# Patient Record
Sex: MALE | Race: WHITE | NOT HISPANIC OR LATINO | Employment: OTHER | URBAN - METROPOLITAN AREA
[De-identification: names, ages, dates, MRNs, and addresses within clinical notes are randomized per-mention and may not be internally consistent; named-entity substitution may affect disease eponyms.]

---

## 2017-03-21 ENCOUNTER — OFFICE VISIT (OUTPATIENT)
Dept: FAMILY MEDICINE CLINIC | Facility: CLINIC | Age: 67
End: 2017-03-21

## 2017-03-21 VITALS
HEART RATE: 111 BPM | RESPIRATION RATE: 16 BRPM | DIASTOLIC BLOOD PRESSURE: 76 MMHG | HEIGHT: 68 IN | SYSTOLIC BLOOD PRESSURE: 140 MMHG | WEIGHT: 217.2 LBS | TEMPERATURE: 98.2 F | BODY MASS INDEX: 32.92 KG/M2 | OXYGEN SATURATION: 98 %

## 2017-03-21 DIAGNOSIS — J01.01 ACUTE RECURRENT MAXILLARY SINUSITIS: Primary | ICD-10-CM

## 2017-03-21 PROCEDURE — 96372 THER/PROPH/DIAG INJ SC/IM: CPT | Performed by: INTERNAL MEDICINE

## 2017-03-21 PROCEDURE — 99213 OFFICE O/P EST LOW 20 MIN: CPT | Performed by: INTERNAL MEDICINE

## 2017-03-21 RX ORDER — ACETAMINOPHEN 500 MG
500 TABLET ORAL EVERY 6 HOURS PRN
COMMUNITY
End: 2022-08-24

## 2017-03-21 RX ORDER — AZITHROMYCIN 250 MG/1
TABLET, FILM COATED ORAL
Qty: 6 TABLET | Refills: 0 | Status: SHIPPED | OUTPATIENT
Start: 2017-03-21 | End: 2017-06-06

## 2017-03-21 RX ORDER — METHYLPREDNISOLONE ACETATE 80 MG/ML
80 INJECTION, SUSPENSION INTRA-ARTICULAR; INTRALESIONAL; INTRAMUSCULAR; SOFT TISSUE ONCE
Status: COMPLETED | OUTPATIENT
Start: 2017-03-21 | End: 2017-03-21

## 2017-03-21 RX ORDER — GUAIFENESIN 600 MG/1
1200 TABLET, EXTENDED RELEASE ORAL 2 TIMES DAILY
COMMUNITY
End: 2018-10-03

## 2017-03-21 RX ADMIN — METHYLPREDNISOLONE ACETATE 80 MG: 80 INJECTION, SUSPENSION INTRA-ARTICULAR; INTRALESIONAL; INTRAMUSCULAR; SOFT TISSUE at 11:29

## 2017-03-21 NOTE — PROGRESS NOTES
"Subjective   Patient ID: Juan C Kumar is a 66 y.o. male presents with   Chief Complaint   Patient presents with   • Sinusitis     nasal drainage, x's 1 month   • Headache   • Cough       HPI - this patient has allergies and sinus issues for about a week.  Symptoms are mild-to-moderate in intensity and constant.  He gets sick around this time a year steroid shots in antibiotics help.    Assessment plan    Acute recurrent maxillary sinusitis Z-Bonnie and Medrol dip oh 80.    No Known Allergies    The following portions of the patient's history were reviewed and updated as appropriate: allergies, current medications, past family history, past medical history, past social history, past surgical history and problem list.      Review of Systems   Constitutional: Positive for fatigue. Negative for fever.   HENT: Positive for congestion and sinus pressure.    Respiratory: Negative.    Cardiovascular: Negative.    Psychiatric/Behavioral: Negative.        Objective     Vitals:    03/21/17 1032   BP: 140/76   Pulse: 111   Resp: 16   Temp: 98.2 °F (36.8 °C)   TempSrc: Oral   SpO2: 98%   Weight: 217 lb 3.2 oz (98.5 kg)   Height: 68\" (172.7 cm)         Physical Exam   Constitutional: He appears well-developed and well-nourished.   HENT:   Head: Normocephalic and atraumatic.   Sinus congestion   Eyes: EOM are normal. Pupils are equal, round, and reactive to light.   Cardiovascular: Normal rate and regular rhythm.    Pulmonary/Chest: Effort normal and breath sounds normal.   Nursing note and vitals reviewed.        Juan C was seen today for sinusitis, headache and cough.    Diagnoses and all orders for this visit:    Acute recurrent maxillary sinusitis  -     methylPREDNISolone acetate (DEPO-medrol) injection 80 mg; Inject 1 mL into the shoulder, thigh, or buttocks 1 (One) Time.    Other orders  -     azithromycin (ZITHROMAX Z-BONNIE) 250 MG tablet; Take 2 tablets the first day, then 1 tablet daily for 4 days.        Call or return to " clinic prn if these symptoms worsen or fail to improve as anticipated.

## 2017-05-24 DIAGNOSIS — R73.01 IFG (IMPAIRED FASTING GLUCOSE): Primary | ICD-10-CM

## 2017-05-24 DIAGNOSIS — N40.0 BENIGN NON-NODULAR PROSTATIC HYPERPLASIA WITHOUT LOWER URINARY TRACT SYMPTOMS: ICD-10-CM

## 2017-05-24 DIAGNOSIS — Z12.5 SCREENING FOR PROSTATE CANCER: ICD-10-CM

## 2017-05-24 DIAGNOSIS — E78.5 HYPERLIPIDEMIA, UNSPECIFIED HYPERLIPIDEMIA TYPE: ICD-10-CM

## 2017-05-24 DIAGNOSIS — Z00.00 HEALTH CARE MAINTENANCE: ICD-10-CM

## 2017-05-25 ENCOUNTER — RESULTS ENCOUNTER (OUTPATIENT)
Dept: FAMILY MEDICINE CLINIC | Facility: CLINIC | Age: 67
End: 2017-05-25

## 2017-05-25 DIAGNOSIS — R73.01 IFG (IMPAIRED FASTING GLUCOSE): ICD-10-CM

## 2017-05-25 DIAGNOSIS — N40.0 BENIGN NON-NODULAR PROSTATIC HYPERPLASIA WITHOUT LOWER URINARY TRACT SYMPTOMS: ICD-10-CM

## 2017-05-25 DIAGNOSIS — Z00.00 HEALTH CARE MAINTENANCE: ICD-10-CM

## 2017-05-25 DIAGNOSIS — E78.5 HYPERLIPIDEMIA, UNSPECIFIED HYPERLIPIDEMIA TYPE: ICD-10-CM

## 2017-05-25 DIAGNOSIS — Z12.5 SCREENING FOR PROSTATE CANCER: ICD-10-CM

## 2017-05-25 LAB
ALBUMIN SERPL-MCNC: 4.4 G/DL (ref 3.5–5.2)
ALBUMIN/GLOB SERPL: 1.8 G/DL
ALP SERPL-CCNC: 67 U/L (ref 39–117)
ALT SERPL-CCNC: 30 U/L (ref 1–41)
AST SERPL-CCNC: 25 U/L (ref 1–40)
BASOPHILS # BLD AUTO: 0.03 10*3/MM3 (ref 0–0.2)
BASOPHILS NFR BLD AUTO: 0.5 % (ref 0–1.5)
BILIRUB SERPL-MCNC: 0.5 MG/DL (ref 0.1–1.2)
BUN SERPL-MCNC: 12 MG/DL (ref 8–23)
BUN/CREAT SERPL: 15.6 (ref 7–25)
CALCIUM SERPL-MCNC: 9.3 MG/DL (ref 8.6–10.5)
CHLORIDE SERPL-SCNC: 103 MMOL/L (ref 98–107)
CHOLEST SERPL-MCNC: 148 MG/DL (ref 0–200)
CO2 SERPL-SCNC: 24.2 MMOL/L (ref 22–29)
CREAT SERPL-MCNC: 0.77 MG/DL (ref 0.76–1.27)
EOSINOPHIL # BLD AUTO: 0.18 10*3/MM3 (ref 0–0.7)
EOSINOPHIL NFR BLD AUTO: 3.1 % (ref 0.3–6.2)
ERYTHROCYTE [DISTWIDTH] IN BLOOD BY AUTOMATED COUNT: 14.3 % (ref 11.5–14.5)
GLOBULIN SER CALC-MCNC: 2.4 GM/DL
GLUCOSE SERPL-MCNC: 113 MG/DL (ref 65–99)
HBA1C MFR BLD: 6.15 % (ref 4.8–5.6)
HCT VFR BLD AUTO: 46.5 % (ref 40.4–52.2)
HDLC SERPL-MCNC: 48 MG/DL (ref 40–60)
HGB BLD-MCNC: 15.3 G/DL (ref 13.7–17.6)
IMM GRANULOCYTES # BLD: 0.02 10*3/MM3 (ref 0–0.03)
IMM GRANULOCYTES NFR BLD: 0.3 % (ref 0–0.5)
LDLC SERPL CALC-MCNC: 87 MG/DL (ref 0–100)
LDLC/HDLC SERPL: 1.82 {RATIO}
LYMPHOCYTES # BLD AUTO: 1.61 10*3/MM3 (ref 0.9–4.8)
LYMPHOCYTES NFR BLD AUTO: 27.6 % (ref 19.6–45.3)
MCH RBC QN AUTO: 31 PG (ref 27–32.7)
MCHC RBC AUTO-ENTMCNC: 32.9 G/DL (ref 32.6–36.4)
MCV RBC AUTO: 94.3 FL (ref 79.8–96.2)
MONOCYTES # BLD AUTO: 0.81 10*3/MM3 (ref 0.2–1.2)
MONOCYTES NFR BLD AUTO: 13.9 % (ref 5–12)
NEUTROPHILS # BLD AUTO: 3.19 10*3/MM3 (ref 1.9–8.1)
NEUTROPHILS NFR BLD AUTO: 54.6 % (ref 42.7–76)
PLATELET # BLD AUTO: 256 10*3/MM3 (ref 140–500)
POTASSIUM SERPL-SCNC: 4.3 MMOL/L (ref 3.5–5.2)
PROT SERPL-MCNC: 6.8 G/DL (ref 6–8.5)
PSA SERPL-MCNC: 0.54 NG/ML (ref 0–4)
RBC # BLD AUTO: 4.93 10*6/MM3 (ref 4.6–6)
SODIUM SERPL-SCNC: 141 MMOL/L (ref 136–145)
TRIGL SERPL-MCNC: 64 MG/DL (ref 0–150)
VLDLC SERPL CALC-MCNC: 12.8 MG/DL (ref 5–40)
WBC # BLD AUTO: 5.84 10*3/MM3 (ref 4.5–10.7)

## 2017-06-01 RX ORDER — ATORVASTATIN CALCIUM 20 MG/1
TABLET, FILM COATED ORAL
Qty: 90 TABLET | Refills: 0 | Status: SHIPPED | OUTPATIENT
Start: 2017-06-01 | End: 2017-08-27 | Stop reason: SDUPTHER

## 2017-06-06 ENCOUNTER — OFFICE VISIT (OUTPATIENT)
Dept: FAMILY MEDICINE CLINIC | Facility: CLINIC | Age: 67
End: 2017-06-06

## 2017-06-06 VITALS
BODY MASS INDEX: 31.83 KG/M2 | HEART RATE: 101 BPM | OXYGEN SATURATION: 96 % | TEMPERATURE: 98 F | WEIGHT: 210 LBS | SYSTOLIC BLOOD PRESSURE: 120 MMHG | HEIGHT: 68 IN | DIASTOLIC BLOOD PRESSURE: 68 MMHG

## 2017-06-06 DIAGNOSIS — Z00.00 HEALTH CARE MAINTENANCE: ICD-10-CM

## 2017-06-06 DIAGNOSIS — Z12.5 PROSTATE CANCER SCREENING: ICD-10-CM

## 2017-06-06 DIAGNOSIS — E78.00 PURE HYPERCHOLESTEROLEMIA: Primary | ICD-10-CM

## 2017-06-06 DIAGNOSIS — R73.01 IFG (IMPAIRED FASTING GLUCOSE): ICD-10-CM

## 2017-06-06 PROCEDURE — 90670 PCV13 VACCINE IM: CPT | Performed by: INTERNAL MEDICINE

## 2017-06-06 PROCEDURE — 99214 OFFICE O/P EST MOD 30 MIN: CPT | Performed by: INTERNAL MEDICINE

## 2017-06-06 PROCEDURE — G0009 ADMIN PNEUMOCOCCAL VACCINE: HCPCS | Performed by: INTERNAL MEDICINE

## 2017-06-06 NOTE — PROGRESS NOTES
"Subjective   Patient ID: Juan C Kumar is a 67 y.o. male presents with   Chief Complaint   Patient presents with   • Hyperlipidemia     6 mo check up    • Labs Only     lad results        HPI - This patient presents today for follow-up of hyperlipidemia and impaired fasting glucose osteoarthritis.  Overall he's feeling good he's lost some weight and this has helped his arthritis and we reviewed his labs his LDL has gotten significantly better since losing weight.  Overall he feels pretty good.    Assessment plan    Hyperlipidemia-atorvastatin 20    GERD Nexium    Impaired fasting glucose recommend exercise diet weight loss and carbohydrate restriction    Health care maintenance-Prevnar 13 he's caught up on PSA and colon cancer screening.    No Known Allergies    The following portions of the patient's history were reviewed and updated as appropriate: allergies, current medications, past family history, past medical history, past social history, past surgical history and problem list.      Review of Systems   Constitutional: Negative.    HENT: Negative.    Eyes: Negative.    Respiratory: Negative.    Cardiovascular: Negative.    Gastrointestinal: Negative.    Endocrine: Negative.    Genitourinary: Negative.    Musculoskeletal: Positive for arthralgias.   Skin: Negative.    Allergic/Immunologic: Negative.    Neurological: Negative.    Hematological: Negative.    Psychiatric/Behavioral: Negative.        Objective     Vitals:    06/06/17 0852   BP: 120/68   Pulse: 101   Temp: 98 °F (36.7 °C)   SpO2: 96%   Weight: 210 lb (95.3 kg)   Height: 68\" (172.7 cm)         Physical Exam   Constitutional: He is oriented to person, place, and time. He appears well-developed and well-nourished. No distress.   HENT:   Head: Normocephalic and atraumatic.   Eyes: Conjunctivae and EOM are normal. Pupils are equal, round, and reactive to light. Right eye exhibits no discharge. Left eye exhibits no discharge. No scleral icterus.   Neck: " Normal range of motion. Neck supple. No tracheal deviation present. No thyromegaly present.   Cardiovascular: Normal rate, regular rhythm, normal heart sounds, intact distal pulses and normal pulses.  Exam reveals no gallop.    No murmur heard.  Pulmonary/Chest: Effort normal and breath sounds normal. No respiratory distress. He has no wheezes. He has no rales.   Musculoskeletal: Normal range of motion.   Neurological: He is alert and oriented to person, place, and time. He exhibits normal muscle tone. Coordination normal.   Skin: Skin is warm. No rash noted. No erythema. No pallor.   Psychiatric: He has a normal mood and affect. His behavior is normal. Judgment and thought content normal.   Nursing note and vitals reviewed.        Juan C was seen today for hyperlipidemia and labs only.    Diagnoses and all orders for this visit:    Pure hypercholesterolemia    IFG (impaired fasting glucose)    Health care maintenance    Prostate cancer screening    Other orders  -     Pneumococcal Conjugate Vaccine 13-Valent All  -     loratadine-pseudoephedrine (CLARITIN-D 12-hour) 5-120 MG per 12 hr tablet; Take 1 tablet by mouth 2 (Two) Times a Day.        Call or return to clinic prn if these symptoms worsen or fail to improve as anticipated.

## 2017-08-28 RX ORDER — ATORVASTATIN CALCIUM 20 MG/1
TABLET, FILM COATED ORAL
Qty: 90 TABLET | Refills: 0 | Status: SHIPPED | OUTPATIENT
Start: 2017-08-28 | End: 2017-11-23 | Stop reason: SDUPTHER

## 2017-11-01 ENCOUNTER — TELEPHONE (OUTPATIENT)
Dept: FAMILY MEDICINE CLINIC | Facility: CLINIC | Age: 67
End: 2017-11-01

## 2017-11-01 RX ORDER — AZITHROMYCIN 250 MG/1
TABLET, FILM COATED ORAL
Qty: 6 TABLET | Refills: 0 | Status: SHIPPED | OUTPATIENT
Start: 2017-11-01 | End: 2017-12-05

## 2017-11-10 ENCOUNTER — FLU SHOT (OUTPATIENT)
Dept: FAMILY MEDICINE CLINIC | Facility: CLINIC | Age: 67
End: 2017-11-10

## 2017-11-10 DIAGNOSIS — Z23 NEED FOR INFLUENZA VACCINATION: Primary | ICD-10-CM

## 2017-11-10 PROCEDURE — 90662 IIV NO PRSV INCREASED AG IM: CPT | Performed by: INTERNAL MEDICINE

## 2017-11-10 PROCEDURE — G0008 ADMIN INFLUENZA VIRUS VAC: HCPCS | Performed by: INTERNAL MEDICINE

## 2017-11-22 DIAGNOSIS — E78.5 HYPERLIPIDEMIA, UNSPECIFIED HYPERLIPIDEMIA TYPE: ICD-10-CM

## 2017-11-22 DIAGNOSIS — R73.01 IFG (IMPAIRED FASTING GLUCOSE): Primary | ICD-10-CM

## 2017-11-22 DIAGNOSIS — I15.9 SECONDARY HYPERTENSION: ICD-10-CM

## 2017-11-27 RX ORDER — ATORVASTATIN CALCIUM 20 MG/1
TABLET, FILM COATED ORAL
Qty: 90 TABLET | Refills: 0 | Status: SHIPPED | OUTPATIENT
Start: 2017-11-27 | End: 2017-12-05 | Stop reason: SDUPTHER

## 2017-11-28 ENCOUNTER — RESULTS ENCOUNTER (OUTPATIENT)
Dept: FAMILY MEDICINE CLINIC | Facility: CLINIC | Age: 67
End: 2017-11-28

## 2017-11-28 DIAGNOSIS — I15.9 SECONDARY HYPERTENSION: ICD-10-CM

## 2017-11-28 DIAGNOSIS — E78.5 HYPERLIPIDEMIA, UNSPECIFIED HYPERLIPIDEMIA TYPE: ICD-10-CM

## 2017-11-28 DIAGNOSIS — R73.01 IFG (IMPAIRED FASTING GLUCOSE): ICD-10-CM

## 2017-11-28 LAB
ALBUMIN SERPL-MCNC: 4.1 G/DL (ref 3.5–5.2)
ALBUMIN/GLOB SERPL: 1.4 G/DL
ALP SERPL-CCNC: 68 U/L (ref 39–117)
ALT SERPL-CCNC: 25 U/L (ref 1–41)
AST SERPL-CCNC: 25 U/L (ref 1–40)
BASOPHILS # BLD AUTO: 0.05 10*3/MM3 (ref 0–0.2)
BASOPHILS NFR BLD AUTO: 0.9 % (ref 0–1.5)
BILIRUB SERPL-MCNC: 0.6 MG/DL (ref 0.1–1.2)
BUN SERPL-MCNC: 16 MG/DL (ref 8–23)
BUN/CREAT SERPL: 18.8 (ref 7–25)
CALCIUM SERPL-MCNC: 9.3 MG/DL (ref 8.6–10.5)
CHLORIDE SERPL-SCNC: 100 MMOL/L (ref 98–107)
CHOLEST SERPL-MCNC: 164 MG/DL (ref 0–200)
CO2 SERPL-SCNC: 25.8 MMOL/L (ref 22–29)
CREAT SERPL-MCNC: 0.85 MG/DL (ref 0.76–1.27)
EOSINOPHIL # BLD AUTO: 0.31 10*3/MM3 (ref 0–0.7)
EOSINOPHIL NFR BLD AUTO: 5.3 % (ref 0.3–6.2)
ERYTHROCYTE [DISTWIDTH] IN BLOOD BY AUTOMATED COUNT: 14.1 % (ref 11.5–14.5)
GFR SERPLBLD CREATININE-BSD FMLA CKD-EPI: 109 ML/MIN/1.73
GFR SERPLBLD CREATININE-BSD FMLA CKD-EPI: 90 ML/MIN/1.73
GLOBULIN SER CALC-MCNC: 2.9 GM/DL
GLUCOSE SERPL-MCNC: 110 MG/DL (ref 65–99)
HBA1C MFR BLD: 6.14 % (ref 4.8–5.6)
HCT VFR BLD AUTO: 47.8 % (ref 40.4–52.2)
HDLC SERPL-MCNC: 51 MG/DL (ref 40–60)
HGB BLD-MCNC: 15.4 G/DL (ref 13.7–17.6)
IMM GRANULOCYTES # BLD: 0 10*3/MM3 (ref 0–0.03)
IMM GRANULOCYTES NFR BLD: 0 % (ref 0–0.5)
LDLC SERPL CALC-MCNC: 94 MG/DL (ref 0–100)
LDLC/HDLC SERPL: 1.84 {RATIO}
LYMPHOCYTES # BLD AUTO: 2.1 10*3/MM3 (ref 0.9–4.8)
LYMPHOCYTES NFR BLD AUTO: 35.7 % (ref 19.6–45.3)
MCH RBC QN AUTO: 30.4 PG (ref 27–32.7)
MCHC RBC AUTO-ENTMCNC: 32.2 G/DL (ref 32.6–36.4)
MCV RBC AUTO: 94.3 FL (ref 79.8–96.2)
MONOCYTES # BLD AUTO: 0.76 10*3/MM3 (ref 0.2–1.2)
MONOCYTES NFR BLD AUTO: 12.9 % (ref 5–12)
NEUTROPHILS # BLD AUTO: 2.66 10*3/MM3 (ref 1.9–8.1)
NEUTROPHILS NFR BLD AUTO: 45.2 % (ref 42.7–76)
PLATELET # BLD AUTO: 270 10*3/MM3 (ref 140–500)
POTASSIUM SERPL-SCNC: 4.3 MMOL/L (ref 3.5–5.2)
PROT SERPL-MCNC: 7 G/DL (ref 6–8.5)
RBC # BLD AUTO: 5.07 10*6/MM3 (ref 4.6–6)
SODIUM SERPL-SCNC: 139 MMOL/L (ref 136–145)
TRIGL SERPL-MCNC: 97 MG/DL (ref 0–150)
VLDLC SERPL CALC-MCNC: 19.4 MG/DL (ref 5–40)
WBC # BLD AUTO: 5.88 10*3/MM3 (ref 4.5–10.7)

## 2017-12-05 ENCOUNTER — OFFICE VISIT (OUTPATIENT)
Dept: FAMILY MEDICINE CLINIC | Facility: CLINIC | Age: 67
End: 2017-12-05

## 2017-12-05 VITALS
WEIGHT: 214 LBS | OXYGEN SATURATION: 94 % | SYSTOLIC BLOOD PRESSURE: 128 MMHG | HEART RATE: 96 BPM | DIASTOLIC BLOOD PRESSURE: 72 MMHG | BODY MASS INDEX: 42.01 KG/M2 | TEMPERATURE: 98.2 F | HEIGHT: 60 IN | RESPIRATION RATE: 16 BRPM

## 2017-12-05 DIAGNOSIS — Z12.11 COLON CANCER SCREENING: Primary | ICD-10-CM

## 2017-12-05 DIAGNOSIS — Z00.00 HEALTH CARE MAINTENANCE: ICD-10-CM

## 2017-12-05 DIAGNOSIS — R73.01 IFG (IMPAIRED FASTING GLUCOSE): ICD-10-CM

## 2017-12-05 DIAGNOSIS — E78.00 PURE HYPERCHOLESTEROLEMIA: ICD-10-CM

## 2017-12-05 PROCEDURE — 99214 OFFICE O/P EST MOD 30 MIN: CPT | Performed by: INTERNAL MEDICINE

## 2017-12-05 RX ORDER — ATORVASTATIN CALCIUM 20 MG/1
20 TABLET, FILM COATED ORAL DAILY
Qty: 90 TABLET | Refills: 2 | Status: SHIPPED | OUTPATIENT
Start: 2017-12-05 | End: 2018-10-03 | Stop reason: SDUPTHER

## 2017-12-05 NOTE — PROGRESS NOTES
"Subjective   Patient ID: Juan C Kumar is a 67 y.o. male presents with   Chief Complaint   Patient presents with   • Hyperlipidemia     f/u   • Hypertension     already had labs       HPI - This patient is here today for treatment of impaired fasting glucose hyper cholesterolemia.  We reviewed his labs and the patient needs to see a gastroenterologist for colon cancer screening.  Overall he feels well I counseled him on exercise diet weight loss.    Assessment plan    Impaired fasting glucose-recommend exercise low-carb diet and weight loss    Hypercholesterolemia-atorvastatin 20 cholesterols under good control    Colon cancer screening appointment with gastroenterologist    Health care maintenance caught up with another screenings and caught up on vaccinations.  Continue exercise diet weight loss.    No Known Allergies    The following portions of the patient's history were reviewed and updated as appropriate: allergies, current medications, past family history, past medical history, past social history, past surgical history and problem list.      Review of Systems   Constitutional: Negative.    HENT: Negative.    Eyes: Negative.    Respiratory: Negative.    Cardiovascular: Negative.    Gastrointestinal: Negative.    Endocrine: Negative.    Genitourinary: Negative.    Musculoskeletal: Negative.    Skin: Negative.    Allergic/Immunologic: Negative.    Neurological: Negative.    Hematological: Negative.    Psychiatric/Behavioral: Negative.        Objective     Vitals:    12/05/17 0927   BP: 128/72   Pulse: 96   Resp: 16   Temp: 98.2 °F (36.8 °C)   TempSrc: Oral   SpO2: 94%   Weight: 97.1 kg (214 lb)   Height: 68 cm (26.77\")         Physical Exam   Constitutional: He is oriented to person, place, and time. He appears well-developed and well-nourished. No distress.   HENT:   Head: Normocephalic and atraumatic.   Eyes: Conjunctivae and EOM are normal. Pupils are equal, round, and reactive to light. Right eye exhibits no " discharge. Left eye exhibits no discharge. No scleral icterus.   Neck: Normal range of motion. Neck supple. No tracheal deviation present. No thyromegaly present.   Cardiovascular: Normal rate, regular rhythm, normal heart sounds and normal pulses.  Exam reveals no gallop.    No murmur heard.  Pulmonary/Chest: Effort normal and breath sounds normal. No respiratory distress. He has no wheezes. He has no rales.   Musculoskeletal: Normal range of motion.   Neurological: He is alert and oriented to person, place, and time. He exhibits normal muscle tone. Coordination normal.   Skin: Skin is warm. No rash noted. No erythema. No pallor.   Psychiatric: He has a normal mood and affect. His behavior is normal. Judgment and thought content normal.   Nursing note and vitals reviewed.        Juan C was seen today for hyperlipidemia and hypertension.    Diagnoses and all orders for this visit:    Colon cancer screening  -     Ambulatory Referral to Gastroenterology    [facility] maintenance    IFG (impaired fasting glucose)    Pure hypercholesterolemia        Call or return to clinic prn if these symptoms worsen or fail to improve as anticipated.

## 2018-05-03 VITALS
DIASTOLIC BLOOD PRESSURE: 89 MMHG | SYSTOLIC BLOOD PRESSURE: 133 MMHG | OXYGEN SATURATION: 98 % | DIASTOLIC BLOOD PRESSURE: 71 MMHG | RESPIRATION RATE: 12 BRPM | DIASTOLIC BLOOD PRESSURE: 78 MMHG | SYSTOLIC BLOOD PRESSURE: 104 MMHG | RESPIRATION RATE: 10 BRPM | OXYGEN SATURATION: 95 % | TEMPERATURE: 97 F | HEIGHT: 68 IN | RESPIRATION RATE: 16 BRPM | SYSTOLIC BLOOD PRESSURE: 123 MMHG | WEIGHT: 205 LBS | RESPIRATION RATE: 13 BRPM | HEART RATE: 83 BPM | HEART RATE: 74 BPM | HEART RATE: 85 BPM | SYSTOLIC BLOOD PRESSURE: 131 MMHG | SYSTOLIC BLOOD PRESSURE: 112 MMHG | SYSTOLIC BLOOD PRESSURE: 120 MMHG | HEART RATE: 70 BPM | DIASTOLIC BLOOD PRESSURE: 82 MMHG | TEMPERATURE: 97.2 F | HEART RATE: 78 BPM | OXYGEN SATURATION: 96 % | RESPIRATION RATE: 18 BRPM | DIASTOLIC BLOOD PRESSURE: 70 MMHG | HEART RATE: 79 BPM | SYSTOLIC BLOOD PRESSURE: 121 MMHG | DIASTOLIC BLOOD PRESSURE: 66 MMHG | HEART RATE: 77 BPM | OXYGEN SATURATION: 97 % | HEART RATE: 81 BPM | DIASTOLIC BLOOD PRESSURE: 79 MMHG | DIASTOLIC BLOOD PRESSURE: 80 MMHG | SYSTOLIC BLOOD PRESSURE: 124 MMHG

## 2018-05-07 PROBLEM — Z86.010 PERSONAL HISTORY OF COLONIC POLYPS: Status: ACTIVE | Noted: 2018-05-08

## 2018-05-08 ENCOUNTER — AMBULATORY SURGICAL CENTER (AMBULATORY)
Dept: URBAN - METROPOLITAN AREA SURGERY 17 | Facility: SURGERY | Age: 68
End: 2018-05-08

## 2018-05-08 ENCOUNTER — OFFICE (AMBULATORY)
Dept: URBAN - METROPOLITAN AREA PATHOLOGY 4 | Facility: PATHOLOGY | Age: 68
End: 2018-05-08

## 2018-05-08 DIAGNOSIS — D12.0 BENIGN NEOPLASM OF CECUM: ICD-10-CM

## 2018-05-08 DIAGNOSIS — K57.30 DIVERTICULOSIS OF LARGE INTESTINE WITHOUT PERFORATION OR ABS: ICD-10-CM

## 2018-05-08 DIAGNOSIS — D12.2 BENIGN NEOPLASM OF ASCENDING COLON: ICD-10-CM

## 2018-05-08 DIAGNOSIS — D17.79 BENIGN LIPOMATOUS NEOPLASM OF OTHER SITES: ICD-10-CM

## 2018-05-08 DIAGNOSIS — Z86.010 PERSONAL HISTORY OF COLONIC POLYPS: ICD-10-CM

## 2018-05-08 DIAGNOSIS — K64.0 FIRST DEGREE HEMORRHOIDS: ICD-10-CM

## 2018-05-08 LAB
GI HISTOLOGY: A. UNSPECIFIED: (no result)
GI HISTOLOGY: B. UNSPECIFIED: (no result)
GI HISTOLOGY: PDF REPORT: (no result)

## 2018-05-08 PROCEDURE — 88305 TISSUE EXAM BY PATHOLOGIST: CPT | Mod: PT | Performed by: INTERNAL MEDICINE

## 2018-05-08 PROCEDURE — 45385 COLONOSCOPY W/LESION REMOVAL: CPT | Mod: PT | Performed by: INTERNAL MEDICINE

## 2018-05-08 PROCEDURE — 45380 COLONOSCOPY AND BIOPSY: CPT | Mod: 59,PT | Performed by: INTERNAL MEDICINE

## 2018-05-08 RX ADMIN — PROPOFOL 50 MG: 10 INJECTION, EMULSION INTRAVENOUS at 08:49

## 2018-05-08 RX ADMIN — PROPOFOL 75 MG: 10 INJECTION, EMULSION INTRAVENOUS at 08:43

## 2018-05-08 RX ADMIN — PROPOFOL 50 MG: 10 INJECTION, EMULSION INTRAVENOUS at 08:47

## 2018-05-08 RX ADMIN — LIDOCAINE HYDROCHLORIDE 25 MG: 10 INJECTION, SOLUTION EPIDURAL; INFILTRATION; INTRACAUDAL; PERINEURAL at 08:43

## 2018-05-08 RX ADMIN — PROPOFOL 50 MG: 10 INJECTION, EMULSION INTRAVENOUS at 08:55

## 2018-05-08 RX ADMIN — PROPOFOL 50 MG: 10 INJECTION, EMULSION INTRAVENOUS at 08:52

## 2018-05-08 RX ADMIN — PROPOFOL 50 MG: 10 INJECTION, EMULSION INTRAVENOUS at 08:45

## 2018-05-08 NOTE — SERVICEHPINOTES
ISRAEL GONZALEZ   presents for a "bypass" colonoscopy at [facility] for the purposes of polyp surveillance/ history of colon polyps.  Updated NP paperwork reviewed and verified.  Risks of colonoscopy including bleeding, perforation, missed lesion, complications of sedation and pain reviewed with patient.

## 2018-06-05 ENCOUNTER — OFFICE VISIT (OUTPATIENT)
Dept: FAMILY MEDICINE CLINIC | Facility: CLINIC | Age: 68
End: 2018-06-05

## 2018-06-05 VITALS
RESPIRATION RATE: 18 BRPM | BODY MASS INDEX: 32.4 KG/M2 | TEMPERATURE: 97.5 F | HEART RATE: 97 BPM | SYSTOLIC BLOOD PRESSURE: 128 MMHG | HEIGHT: 68 IN | OXYGEN SATURATION: 95 % | WEIGHT: 213.8 LBS | DIASTOLIC BLOOD PRESSURE: 66 MMHG

## 2018-06-05 DIAGNOSIS — R79.9 ABNORMAL BLOOD CHEMISTRY TEST: ICD-10-CM

## 2018-06-05 DIAGNOSIS — R73.01 IFG (IMPAIRED FASTING GLUCOSE): Primary | ICD-10-CM

## 2018-06-05 DIAGNOSIS — Z91.89 AT HIGH RISK FOR INFECTION: ICD-10-CM

## 2018-06-05 DIAGNOSIS — Z23 NEED FOR TDAP VACCINATION: ICD-10-CM

## 2018-06-05 DIAGNOSIS — E78.00 PURE HYPERCHOLESTEROLEMIA: ICD-10-CM

## 2018-06-05 DIAGNOSIS — Z12.5 SCREENING FOR PROSTATE CANCER: ICD-10-CM

## 2018-06-05 DIAGNOSIS — K21.9 GASTROESOPHAGEAL REFLUX DISEASE, ESOPHAGITIS PRESENCE NOT SPECIFIED: ICD-10-CM

## 2018-06-05 DIAGNOSIS — S50.01XA CONTUSION OF RIGHT ELBOW, INITIAL ENCOUNTER: ICD-10-CM

## 2018-06-05 DIAGNOSIS — G56.03 CARPAL TUNNEL SYNDROME, BILATERAL: ICD-10-CM

## 2018-06-05 DIAGNOSIS — Z23 NEED FOR PNEUMOCOCCAL VACCINE: ICD-10-CM

## 2018-06-05 DIAGNOSIS — M89.9 DISORDER OF BONE: ICD-10-CM

## 2018-06-05 DIAGNOSIS — M19.041 PRIMARY OSTEOARTHRITIS OF BOTH HANDS: ICD-10-CM

## 2018-06-05 DIAGNOSIS — M19.042 PRIMARY OSTEOARTHRITIS OF BOTH HANDS: ICD-10-CM

## 2018-06-05 PROBLEM — F33.8 SEASONAL AFFECTIVE DISORDER: Status: ACTIVE | Noted: 2018-06-05

## 2018-06-05 PROCEDURE — 90471 IMMUNIZATION ADMIN: CPT | Performed by: FAMILY MEDICINE

## 2018-06-05 PROCEDURE — G0009 ADMIN PNEUMOCOCCAL VACCINE: HCPCS | Performed by: FAMILY MEDICINE

## 2018-06-05 PROCEDURE — 90732 PPSV23 VACC 2 YRS+ SUBQ/IM: CPT | Performed by: FAMILY MEDICINE

## 2018-06-05 PROCEDURE — 90715 TDAP VACCINE 7 YRS/> IM: CPT | Performed by: FAMILY MEDICINE

## 2018-06-05 PROCEDURE — 99214 OFFICE O/P EST MOD 30 MIN: CPT | Performed by: FAMILY MEDICINE

## 2018-06-05 RX ORDER — NABUMETONE 500 MG/1
TABLET, FILM COATED ORAL
Qty: 120 TABLET | Refills: 1 | Status: SHIPPED | OUTPATIENT
Start: 2018-06-05 | End: 2019-11-18 | Stop reason: SDUPTHER

## 2018-06-05 RX ORDER — AMOXICILLIN 500 MG
1200 CAPSULE ORAL DAILY
COMMUNITY

## 2018-06-05 RX ORDER — ACETAMINOPHEN 160 MG
TABLET,DISINTEGRATING ORAL
Qty: 60 CAPSULE
Start: 2018-06-05

## 2018-06-05 RX ORDER — GAUZE BANDAGE 2" X 2"
BANDAGE TOPICAL
COMMUNITY

## 2018-06-05 NOTE — PROGRESS NOTES
Subjective   Juan C Kumar is a 68 y.o. male.     New patient transferring care from Dr. Abdul for follow-up of hyperlipidemia, prediabetes, arthritis and other concerns.  Can't seem to lose and keep weight off.  Doesn't really get any significant aerobic exercise.  Has had controlled hyperlipidemia taking atorvastatin.  Has significant reflux symptomatology that mandates daily esomeprazole.  Has never had EGD.  Did have a colonoscopy earlier this year which showed polyps with five-year follow-up recommended.  Arthritis is bothering him.  He used to take Daypro which helped.  Hands bother him quite a bit from time to time and he also has hip and knee pain.  Has a history of left-sided sciatica with low back pain which flares from time to time.  Told in the past that he had carpal tunnel syndrome.  Wears splints.  His bilateral symptomatology is worsening.    History of basal cell carcinoma on his nose.  Sees a dermatologist periodically.    He's having no difficulty with his medications.  Rated that he can't seem to get motivated about exercise and weight loss.  Had Prevnar.  Doesn't recall last tetanus shot.  Hasn't been screened for hep C.         The following portions of the patient's history were reviewed and updated as appropriate: allergies, current medications, past family history, past medical history, past social history, past surgical history and problem list.    Review of Systems   Constitutional: Negative.  Negative for chills and fever.   HENT: Negative.  Negative for congestion, rhinorrhea and sore throat.    Eyes: Negative.  Negative for discharge and visual disturbance.   Respiratory: Negative.  Negative for cough and shortness of breath.    Cardiovascular: Negative.  Negative for chest pain.   Gastrointestinal: Negative.  Negative for abdominal pain, constipation, diarrhea, nausea and vomiting.   Endocrine: Negative.  Negative for polydipsia.   Genitourinary: Negative.  Negative for dysuria and  hematuria.   Musculoskeletal: Positive for arthralgias, back pain and joint swelling. Negative for myalgias.        Bilateral radial hand pain worse at night   Skin: Negative.  Negative for rash.   Allergic/Immunologic: Positive for environmental allergies.        Pollen and grass   Neurological: Negative.  Negative for weakness, numbness and headaches.   Hematological: Bruises/bleeds easily.   Psychiatric/Behavioral: Negative.  Negative for dysphoric mood. The patient is not nervous/anxious.    All other systems reviewed and are negative.      Objective   Physical Exam   Constitutional: He is oriented to person, place, and time. He appears well-developed and well-nourished.   HENT:   Head: Normocephalic and atraumatic.   Right Ear: External ear normal.   Left Ear: External ear normal.   Mouth/Throat: No oropharyngeal exudate.   Eyes: Conjunctivae, EOM and lids are normal. Pupils are equal, round, and reactive to light. Right eye exhibits no discharge. Left eye exhibits no discharge. No scleral icterus.   Neck: Trachea normal, normal range of motion and phonation normal. Neck supple. No thyromegaly present.   Cardiovascular: Normal rate, regular rhythm and normal heart sounds.  Exam reveals no gallop and no friction rub.    No murmur heard.  Pulmonary/Chest: Effort normal and breath sounds normal. No stridor. He has no wheezes. He has no rales.   Abdominal: Soft. He exhibits no distension. There is no tenderness.   Musculoskeletal: Normal range of motion. He exhibits no edema.   Atrophic degenerative changes and hand joints.  Bilateral positive Phalen at wrist.     Lymphadenopathy:     He has no cervical adenopathy.   Neurological: He is alert and oriented to person, place, and time. He has normal strength. No cranial nerve deficit.   Skin: Skin is warm, dry and intact. No petechiae and no rash noted. No cyanosis. Nails show no clubbing.        Psychiatric: He has a normal mood and affect. His speech is normal and  behavior is normal. Judgment and thought content normal. Cognition and memory are normal.   Nursing note and vitals reviewed.    Viewed labs with him from last time.  LDL is excellent.  He has done well with his lipids.  Has prediabetic A1c.  His last PSA was normal and he would like a repeat now.  No recent vitamin D is seen in the records.  No hep C results available.    Assessment/Plan   Juan C was seen today for establish care and hyperlipidemia.    Diagnoses and all orders for this visit:    IFG (impaired fasting glucose)  -     Hemoglobin A1c  -     Basic Metabolic Panel    Contusion of right elbow, initial encounter    Screening for prostate cancer  -     PSA Screen    Need for pneumococcal vaccine    Primary osteoarthritis of both hands    Pure hypercholesterolemia    Gastroesophageal reflux disease, esophagitis presence not specified    Abnormal blood chemistry test  -     Hemoglobin A1c  -     Basic Metabolic Panel  -     Vitamin D 25 Hydroxy    Disorder of bone   -     Vitamin D 25 Hydroxy    Carpal tunnel syndrome, bilateral  -     Ambulatory Referral to Hand Surgery    Need for Tdap vaccination    At high risk for infection  -     Hepatitis C Antibody    Other orders  -     Pneumococcal Polysaccharide Vaccine 23-Valent Greater Than or Equal To 1yo Subcutaneous / IM  -     Cholecalciferol (VITAMIN D3) 2000 units capsule; Two OTC caps  QDay to supplement Vitamin D  -     nabumetone (RELAFEN) 500 MG tablet; 2 tabs po BID with food for pain and inflammation  -     loratadine-pseudoephedrine (CLARITIN-D 12-hour) 5-120 MG per 12 hr tablet; Take 1 tablet by mouth 2 (Two) Times a Day.  -     Tdap Vaccine Greater Than or Equal To 8yo IM      Patient Instructions   New anti-inflammatory medication--minimize but use when you need it.    For pain or fever use acetaminophen/Tylenol--650 mg every 4 hours (or 1000 mg up to 4 doses daily), maximum 4000 mg/24 hours but no other OTC pain medications.     Read YOUNGER NEXT  YEAR    Work on aerobic and resistance exercise  Watch carbs, saturated fats.    I would strongly encourage you to sign up for My Chart using the access code provided with these papers.  This provides an excellent convenient way for you to communicate with us and with any of your [facility] physicians.  Lab and x-ray reports can be viewed, prescription refills and appointments may be requested, and you may send emails to your physician's office.      IT IS VERY IMPORTANT THAT YOU KEEP A PRINTED LIST OF ALL OF YOUR MEDICATIONS AND DOSES AND OF ALL MEDICATION ALLERGIES WITH YOU/ON YOUR PERSON AT ALL TIMES.  THIS IS OF CRITICAL IMPORTANCE IN THE EVENT THAT YOU ARE INJURED OR ILL AND ARE UNABLE TO CONVEY THIS INFORMATION TO THOSE CARING FOR YOU IN AN EMERGENCY SITUATION.      We will contact you about labs    Schedule recheck 4-5 months with fasting labs the week before    Show the pigmented right forearm lesion to your dermatologist    Have all your consulting doctors send me a report when you see them.    We will arrange consult with hand surgery    Four month recheck with Wellness Visit        EMR Dragon/Transcription disclaimer:   Much of this encounter note is an electronic transcription/translation of spoken language to printed text. The electronic translation of spoken language may permit erroneous, or at times, nonsensical words or phrases to be inadvertently transcribed; Although I have reviewed the note for such errors, some may still exist. Please contact me with any questions or concerns about the conduct of this encounter note.

## 2018-06-05 NOTE — PATIENT INSTRUCTIONS
New anti-inflammatory medication--minimize but use when you need it.    For pain or fever use acetaminophen/Tylenol--650 mg every 4 hours (or 1000 mg up to 4 doses daily), maximum 4000 mg/24 hours but no other OTC pain medications.     Read YOUNGER NEXT YEAR    Work on aerobic and resistance exercise  Watch carbs, saturated fats.    I would strongly encourage you to sign up for My Chart using the access code provided with these papers.  This provides an excellent convenient way for you to communicate with us and with any of your [facility] physicians.  Lab and x-ray reports can be viewed, prescription refills and appointments may be requested, and you may send emails to your physician's office.      IT IS VERY IMPORTANT THAT YOU KEEP A PRINTED LIST OF ALL OF YOUR MEDICATIONS AND DOSES AND OF ALL MEDICATION ALLERGIES WITH YOU/ON YOUR PERSON AT ALL TIMES.  THIS IS OF CRITICAL IMPORTANCE IN THE EVENT THAT YOU ARE INJURED OR ILL AND ARE UNABLE TO CONVEY THIS INFORMATION TO THOSE CARING FOR YOU IN AN EMERGENCY SITUATION.      We will contact you about labs    Schedule recheck 4-5 months with fasting labs the week before    Show the pigmented right forearm lesion to your dermatologist    Have all your consulting doctors send me a report when you see them.    We will arrange consult with hand surgery    Four month recheck with Wellness Visit

## 2018-06-06 LAB
25(OH)D3+25(OH)D2 SERPL-MCNC: 53.9 NG/ML (ref 30–100)
BUN SERPL-MCNC: 14 MG/DL (ref 8–23)
BUN/CREAT SERPL: 16.1 (ref 7–25)
CALCIUM SERPL-MCNC: 9.3 MG/DL (ref 8.6–10.5)
CHLORIDE SERPL-SCNC: 102 MMOL/L (ref 98–107)
CO2 SERPL-SCNC: 22.9 MMOL/L (ref 22–29)
CREAT SERPL-MCNC: 0.87 MG/DL (ref 0.76–1.27)
GFR SERPLBLD CREATININE-BSD FMLA CKD-EPI: 105 ML/MIN/1.73
GFR SERPLBLD CREATININE-BSD FMLA CKD-EPI: 87 ML/MIN/1.73
GLUCOSE SERPL-MCNC: 117 MG/DL (ref 65–99)
HBA1C MFR BLD: 5.8 % (ref 4.8–5.6)
HCV AB S/CO SERPL IA: <0.1 S/CO RATIO (ref 0–0.9)
POTASSIUM SERPL-SCNC: 4.3 MMOL/L (ref 3.5–5.2)
PSA SERPL-MCNC: 0.54 NG/ML (ref 0–4)
SODIUM SERPL-SCNC: 142 MMOL/L (ref 136–145)

## 2018-06-07 DIAGNOSIS — R73.03 PREDIABETES: Primary | ICD-10-CM

## 2018-06-07 DIAGNOSIS — E78.00 PURE HYPERCHOLESTEROLEMIA: ICD-10-CM

## 2018-09-13 ENCOUNTER — HOSPITAL ENCOUNTER (OUTPATIENT)
Dept: CARDIOLOGY | Facility: HOSPITAL | Age: 68
Discharge: HOME | End: 2018-09-13
Attending: PLASTIC SURGERY | Admitting: PLASTIC SURGERY

## 2018-09-13 ENCOUNTER — CLINICAL SUPPORT (OUTPATIENT)
Dept: FAMILY MEDICINE CLINIC | Facility: CLINIC | Age: 68
End: 2018-09-13

## 2018-09-13 ENCOUNTER — TRANSCRIBE ORDERS (OUTPATIENT)
Dept: ADMINISTRATIVE | Facility: HOSPITAL | Age: 68
End: 2018-09-13

## 2018-09-13 DIAGNOSIS — Z01.818 PRE-OP TESTING: Primary | ICD-10-CM

## 2018-09-13 DIAGNOSIS — Z23 IMMUNIZATION DUE: Primary | ICD-10-CM

## 2018-09-13 DIAGNOSIS — Z01.818 PRE-OP TESTING: ICD-10-CM

## 2018-09-13 PROCEDURE — 93005 ELECTROCARDIOGRAM TRACING: CPT | Performed by: PLASTIC SURGERY

## 2018-09-13 PROCEDURE — G0008 ADMIN INFLUENZA VIRUS VAC: HCPCS | Performed by: FAMILY MEDICINE

## 2018-09-13 PROCEDURE — 93010 ELECTROCARDIOGRAM REPORT: CPT | Performed by: INTERNAL MEDICINE

## 2018-09-13 PROCEDURE — 90662 IIV NO PRSV INCREASED AG IM: CPT | Performed by: FAMILY MEDICINE

## 2018-09-24 ENCOUNTER — RESULTS ENCOUNTER (OUTPATIENT)
Dept: FAMILY MEDICINE CLINIC | Facility: CLINIC | Age: 68
End: 2018-09-24

## 2018-09-24 DIAGNOSIS — R73.03 PREDIABETES: ICD-10-CM

## 2018-09-24 DIAGNOSIS — E78.00 PURE HYPERCHOLESTEROLEMIA: ICD-10-CM

## 2018-09-26 LAB
ALBUMIN SERPL-MCNC: 4.2 G/DL (ref 3.5–5.2)
ALBUMIN/GLOB SERPL: 1.5 G/DL
ALP SERPL-CCNC: 62 U/L (ref 39–117)
ALT SERPL-CCNC: 26 U/L (ref 1–41)
AST SERPL-CCNC: 27 U/L (ref 1–40)
BASOPHILS # BLD AUTO: 0.06 10*3/MM3 (ref 0–0.2)
BASOPHILS NFR BLD AUTO: 1 % (ref 0–1.5)
BILIRUB SERPL-MCNC: 0.5 MG/DL (ref 0.1–1.2)
BUN SERPL-MCNC: 14 MG/DL (ref 8–23)
BUN/CREAT SERPL: 15.1 (ref 7–25)
CALCIUM SERPL-MCNC: 9.2 MG/DL (ref 8.6–10.5)
CHLORIDE SERPL-SCNC: 104 MMOL/L (ref 98–107)
CHOLEST SERPL-MCNC: 143 MG/DL (ref 0–200)
CO2 SERPL-SCNC: 26.8 MMOL/L (ref 22–29)
CREAT SERPL-MCNC: 0.93 MG/DL (ref 0.76–1.27)
EOSINOPHIL # BLD AUTO: 0.22 10*3/MM3 (ref 0–0.7)
EOSINOPHIL NFR BLD AUTO: 3.8 % (ref 0.3–6.2)
ERYTHROCYTE [DISTWIDTH] IN BLOOD BY AUTOMATED COUNT: 13.6 % (ref 11.5–14.5)
GLOBULIN SER CALC-MCNC: 2.8 GM/DL
GLUCOSE SERPL-MCNC: 123 MG/DL (ref 65–99)
HBA1C MFR BLD: 6.2 % (ref 4.8–5.6)
HCT VFR BLD AUTO: 49 % (ref 40.4–52.2)
HDLC SERPL-MCNC: 47 MG/DL (ref 40–60)
HGB BLD-MCNC: 15.3 G/DL (ref 13.7–17.6)
IMM GRANULOCYTES # BLD: 0.02 10*3/MM3 (ref 0–0.03)
IMM GRANULOCYTES NFR BLD: 0.3 % (ref 0–0.5)
LDLC SERPL CALC-MCNC: 85 MG/DL (ref 0–100)
LYMPHOCYTES # BLD AUTO: 1.86 10*3/MM3 (ref 0.9–4.8)
LYMPHOCYTES NFR BLD AUTO: 31.9 % (ref 19.6–45.3)
MCH RBC QN AUTO: 29.5 PG (ref 27–32.7)
MCHC RBC AUTO-ENTMCNC: 31.2 G/DL (ref 32.6–36.4)
MCV RBC AUTO: 94.4 FL (ref 79.8–96.2)
MONOCYTES # BLD AUTO: 0.82 10*3/MM3 (ref 0.2–1.2)
MONOCYTES NFR BLD AUTO: 14.1 % (ref 5–12)
NEUTROPHILS # BLD AUTO: 2.85 10*3/MM3 (ref 1.9–8.1)
NEUTROPHILS NFR BLD AUTO: 48.9 % (ref 42.7–76)
PLATELET # BLD AUTO: 271 10*3/MM3 (ref 140–500)
POTASSIUM SERPL-SCNC: 4.3 MMOL/L (ref 3.5–5.2)
PROT SERPL-MCNC: 7 G/DL (ref 6–8.5)
RBC # BLD AUTO: 5.19 10*6/MM3 (ref 4.6–6)
SODIUM SERPL-SCNC: 143 MMOL/L (ref 136–145)
TRIGL SERPL-MCNC: 57 MG/DL (ref 0–150)
VLDLC SERPL CALC-MCNC: 11.4 MG/DL (ref 5–40)
WBC # BLD AUTO: 5.83 10*3/MM3 (ref 4.5–10.7)

## 2018-10-03 ENCOUNTER — OFFICE VISIT (OUTPATIENT)
Dept: FAMILY MEDICINE CLINIC | Facility: CLINIC | Age: 68
End: 2018-10-03

## 2018-10-03 VITALS
DIASTOLIC BLOOD PRESSURE: 72 MMHG | RESPIRATION RATE: 18 BRPM | TEMPERATURE: 98.4 F | HEART RATE: 92 BPM | SYSTOLIC BLOOD PRESSURE: 110 MMHG | HEIGHT: 68 IN | WEIGHT: 212.3 LBS | BODY MASS INDEX: 32.18 KG/M2 | OXYGEN SATURATION: 98 %

## 2018-10-03 DIAGNOSIS — F33.8 SEASONAL AFFECTIVE DISORDER (HCC): ICD-10-CM

## 2018-10-03 DIAGNOSIS — K21.9 GASTROESOPHAGEAL REFLUX DISEASE, ESOPHAGITIS PRESENCE NOT SPECIFIED: ICD-10-CM

## 2018-10-03 DIAGNOSIS — Z00.00 ENCOUNTER FOR MEDICARE ANNUAL WELLNESS EXAM: Primary | ICD-10-CM

## 2018-10-03 DIAGNOSIS — F32.9 REACTIVE DEPRESSION: ICD-10-CM

## 2018-10-03 DIAGNOSIS — R73.01 IFG (IMPAIRED FASTING GLUCOSE): ICD-10-CM

## 2018-10-03 DIAGNOSIS — E78.00 PURE HYPERCHOLESTEROLEMIA: ICD-10-CM

## 2018-10-03 PROCEDURE — G0439 PPPS, SUBSEQ VISIT: HCPCS | Performed by: FAMILY MEDICINE

## 2018-10-03 RX ORDER — ATORVASTATIN CALCIUM 20 MG/1
TABLET, FILM COATED ORAL
Qty: 90 TABLET | Refills: 1 | Status: SHIPPED | OUTPATIENT
Start: 2018-10-03 | End: 2019-04-03 | Stop reason: SDUPTHER

## 2018-10-03 RX ORDER — BUPROPION HYDROCHLORIDE 150 MG/1
TABLET, EXTENDED RELEASE ORAL
Qty: 60 TABLET | Refills: 1 | Status: SHIPPED | OUTPATIENT
Start: 2018-10-03 | End: 2019-02-01 | Stop reason: SDUPTHER

## 2018-10-03 RX ORDER — PREDNISOLONE ACETATE 10 MG/ML
SUSPENSION/ DROPS OPHTHALMIC
COMMUNITY
Start: 2018-09-11 | End: 2018-12-18

## 2018-10-03 NOTE — PATIENT INSTRUCTIONS
Talk with your pharmacist about both the new shingles vaccine (Shingrix) and the Hepatitis A vaccine.  Both are two injections, six months apart.  I highly recommend this.      Try switching from Nexium/esomeprazole to Zantac/ranitidine 150 mg twice daily--After on that (both) for one week  Start taking Nexium every other day for two weeks then   Every third day for two weeks etc until you drop off of the Nexium.   If problems let me know    Keep working on sugar/starch restriction and increasing exercise/walking    Schedule follow-up with Mr Epley for 6months    Start bupropion in am for 3-4 days and then add second dose in afternoon/evening, before 8 pm.  Let me know how it is doing    Atorvastatin refills sent

## 2018-10-03 NOTE — PROGRESS NOTES
QUICK REFERENCE INFORMATION:  The ABCs of the Annual Wellness Visit    Subsequent Medicare Wellness Visit    HEALTH RISK ASSESSMENT    1950    Recent Hospitalizations:  No hospitalization(s) within the last year..        Current Medical Providers:  Patient Care Team:  Imer Olivia MD as PCP - General (Family Medicine)  Michael Abdul MD as PCP - Claims Attributed  Tay Orozco MD as Surgeon (Hand Surgery)        Smoking Status:  History   Smoking Status   • Never Smoker   Smokeless Tobacco   • Never Used       Alcohol Consumption:  History   Alcohol Use   • Yes     Comment: social       Depression Screen:   PHQ-2/PHQ-9 Depression Screening 10/3/2018   Little interest or pleasure in doing things 0   Feeling down, depressed, or hopeless 0   Trouble falling or staying asleep, or sleeping too much 0   Feeling tired or having little energy 0   Poor appetite or overeating 0   Feeling bad about yourself - or that you are a failure or have let yourself or your family down 0   Trouble concentrating on things, such as reading the newspaper or watching television 0   Moving or speaking so slowly that other people could have noticed. Or the opposite - being so fidgety or restless that you have been moving around a lot more than usual 0   Thoughts that you would be better off dead, or of hurting yourself in some way 0   Total Score 0   If you checked off any problems, how difficult have these problems made it for you to do your work, take care of things at home, or get along with other people? Not difficult at all       Health Habits and Functional and Cognitive Screening:  Functional & Cognitive Status 10/3/2018   Do you have difficulty preparing food and eating? No   Do you have difficulty bathing yourself, getting dressed or grooming yourself? No   Do you have difficulty using the toilet? No   Do you have difficulty moving around from place to place? No   Do you have trouble with steps or getting out of  a bed or a chair? No   In the past year have you fallen or experienced a near fall? No   Current Diet Well Balanced Diet   Dental Exam Up to date   Eye Exam Up to date   Exercise (times per week) 5 times per week   Current Exercise Activities Include Walking   Do you need help using the phone?  No   Are you deaf or do you have serious difficulty hearing?  No   Do you need help with transportation? No   Do you need help shopping? No   Do you need help preparing meals?  No   Do you need help with housework?  No   Do you need help with laundry? No   Do you need help taking your medications? No   Do you need help managing money? No   Do you ever drive or ride in a car without wearing a seat belt? No   Have you felt unusual stress, anger or loneliness in the last month? No   Who do you live with? Spouse   If you need help, do you have trouble finding someone available to you? No   Have you been bothered in the last four weeks by sexual problems? No   Do you have difficulty concentrating, remembering or making decisions? No           Does the patient have evidence of cognitive impairment? No    Aspirin use counseling: Taking ASA appropriately as indicated      Recent Lab Results:  CMP:  Lab Results   Component Value Date     (H) 09/26/2018    BUN 14 09/26/2018    CREATININE 0.93 09/26/2018    EGFRIFNONA 81 09/26/2018    EGFRIFAFRI 98 09/26/2018    BCR 15.1 09/26/2018     09/26/2018    K 4.3 09/26/2018    CO2 26.8 09/26/2018    CALCIUM 9.2 09/26/2018    PROTENTOTREF 7.0 09/26/2018    ALBUMIN 4.20 09/26/2018    LABGLOBREF 2.8 09/26/2018    LABIL2 1.5 09/26/2018    BILITOT 0.5 09/26/2018    ALKPHOS 62 09/26/2018    AST 27 09/26/2018    ALT 26 09/26/2018     Lipid Panel:  Lab Results   Component Value Date    TRIG 57 09/26/2018    HDL 47 09/26/2018    VLDL 11.4 09/26/2018    LDLHDL 1.84 11/28/2017     HbA1c:  Lab Results   Component Value Date    HGBA1C 6.20 (H) 09/26/2018       Visual Acuity:  No exam data  present    Age-appropriate Screening Schedule:  Refer to the list below for future screening recommendations based on patient's age, sex and/or medical conditions. Orders for these recommended tests are listed in the plan section. The patient has been provided with a written plan.    Health Maintenance   Topic Date Due   • ZOSTER VACCINE (2 of 2) 11/01/2018 (Originally 11/28/2013)   • PROSTATE CANCER SCREENING  06/05/2019   • LIPID PANEL  09/26/2019   • COLONOSCOPY  05/08/2023   • TDAP/TD VACCINES (2 - Td) 06/05/2028   • INFLUENZA VACCINE  Completed   • PNEUMOCOCCAL VACCINES (65+ LOW/MEDIUM RISK)  Completed        Subjective   History of Present Illness    Juan C Kmuar is a 68 y.o. male who presents for an Subsequent Wellness Visit.  Is going to have staged bilateral carpal tunnel release by Dr. Orozco.   Is stressed and down a little because of wife's recurrent appendiceal carcinoma.  Always bothered with seasonal affective disorder.  Has happy lites at home.  Started walking 5 miles a day.  Lost few pounds in the summer and tends to gain back in the fall.  No problems with his meds. Tylenol taking care of his arthritis generally and when he needs when necessary nabumetone it is helpful.    The following portions of the patient's history were reviewed and updated as appropriate: allergies, current medications, past family history, past medical history, past social history, past surgical history and problem list.    Outpatient Medications Prior to Visit   Medication Sig Dispense Refill   • acetaminophen (TYLENOL) 500 MG tablet Take 500 mg by mouth Every 6 (Six) Hours As Needed for Mild Pain (1-3).     • aspirin 81 MG EC tablet Take 81 mg by mouth daily.     • atorvastatin (LIPITOR) 20 MG tablet Take 1 tablet by mouth Daily. 90 tablet 2   • B Complex-C (SUPER B COMPLEX/VITAMIN C) tablet Take  by mouth.     • calcium carbonate (OS-SHELLEY) 600 MG tablet Take 600 mg by mouth Daily.     • Cholecalciferol (VITAMIN D3) 2000  units capsule Two OTC caps  QDay to supplement Vitamin D 60 capsule prn   • coenzyme Q10 100 MG capsule Take 100 mg by mouth daily.     • Esomeprazole Magnesium (NEXIUM PO) Take 22.3 mg by mouth daily.     • Glucosamine-Chondroit-Vit C-Mn (GLUCOSAMINE CHONDR 1500 COMPLX PO) Take  by mouth.     • Multiple Vitamins-Minerals (MULTIVITAMIN ADULT PO) Take  by mouth.     • nabumetone (RELAFEN) 500 MG tablet 2 tabs po BID with food for pain and inflammation 120 tablet 1   • Omega-3 Fatty Acids (FISH OIL) 1200 MG capsule capsule Take 1,200 mg by mouth Daily.     • saw palmetto 80 MG capsule Take 80 mg by mouth 2 (two) times a day.     • VITAMIN K PO Take 100 mg by mouth.     • loratadine-pseudoephedrine (CLARITIN-D 12-hour) 5-120 MG per 12 hr tablet Take 1 tablet by mouth 2 (Two) Times a Day. 90 tablet 1   • guaiFENesin (MUCINEX) 600 MG 12 hr tablet Take 1,200 mg by mouth 2 (Two) Times a Day.       No facility-administered medications prior to visit.        Patient Active Problem List   Diagnosis   • Hyperlipidemia   • Gastroesophageal reflux disease   • Osteoarthritis of both hands   • Health care maintenance   • Prostate cancer screening   • Left-sided low back pain with left-sided sciatica   • IFG (impaired fasting glucose)   • Acute recurrent maxillary sinusitis   • Colon cancer screening   • ]       Advance Care Planning:  has an advance directive - a copy HAS NOT been provided. Have asked the patient to send this to us to add to record.    Identification of Risk Factors:  Risk factors include: weight , cardiovascular risk, caretaker stress and depression.    Review of Systems   Constitutional: Negative for chills and fever.   HENT: Negative for congestion, rhinorrhea and sore throat.    Eyes: Negative for discharge and visual disturbance.   Respiratory: Negative for cough and shortness of breath.    Cardiovascular: Negative for chest pain.   Gastrointestinal: Negative for abdominal pain, constipation, diarrhea, nausea  "and vomiting.   Endocrine: Negative for polydipsia.   Genitourinary: Negative for dysuria and hematuria.   Musculoskeletal: Positive for arthralgias. Negative for myalgias.   Skin: Negative for rash.   Allergic/Immunologic: Negative.    Neurological: Negative for weakness, numbness and headaches.   Hematological: Does not bruise/bleed easily.   Psychiatric/Behavioral: Positive for dysphoric mood. The patient is not nervous/anxious.    All other systems reviewed and are negative.      Compared to one year ago, the patient feels his physical health is better.  Compared to one year ago, the patient feels his mental health is the same.    Objective     Physical Exam    Vitals:    10/03/18 0804   BP: 110/72   Pulse: 92   Resp: 18   Temp: 98.4 °F (36.9 °C)   TempSrc: Oral   SpO2: 98%   Weight: 96.3 kg (212 lb 4.8 oz)   Height: 172.7 cm (67.99\")   PainSc: 0-No pain       Patient's Body mass index is 32.29 kg/m². BMI is above normal parameters. Recommendations include: no follow-up required and nutrition counseling.      Assessment/Plan   Patient Self-Management and Personalized Health Advice  The patient has been provided with information about: diet, exercise, weight management, fall prevention and mental health concerns and preventive services including:   · Exercise counseling provided, Fall Risk assessment done, Influenza vaccine, Hep A and Shingles vaccine.    Visit Diagnoses:    ICD-10-CM ICD-9-CM   1. Encounter for Medicare annual wellness exam Z00.00 V70.0   2. Pure hypercholesterolemia E78.00 272.0   3. Gastroesophageal reflux disease, esophagitis presence not specified K21.9 530.81   4. IFG (impaired fasting glucose) R73.01 790.21   5. Seasonal affective disorder (CMS/[facility]) F33.8 296.99   6. Reactive depression F32.9 300.4       No orders of the defined types were placed in this encounter.  Rx--bupropion    Outpatient Encounter Prescriptions as of 10/3/2018   Medication Sig Dispense Refill   • acetaminophen " (TYLENOL) 500 MG tablet Take 500 mg by mouth Every 6 (Six) Hours As Needed for Mild Pain (1-3).     • aspirin 81 MG EC tablet Take 81 mg by mouth daily.     • atorvastatin (LIPITOR) 20 MG tablet 1 QPM for lipids and risk reduction 90 tablet 1   • B Complex-C (SUPER B COMPLEX/VITAMIN C) tablet Take  by mouth.     • calcium carbonate (OS-SHELLEY) 600 MG tablet Take 600 mg by mouth Daily.     • Cholecalciferol (VITAMIN D3) 2000 units capsule Two OTC caps  QDay to supplement Vitamin D 60 capsule prn   • coenzyme Q10 100 MG capsule Take 100 mg by mouth daily.     • Glucosamine-Chondroit-Vit C-Mn (GLUCOSAMINE CHONDR 1500 COMPLX PO) Take  by mouth.     • Multiple Vitamins-Minerals (MULTIVITAMIN ADULT PO) Take  by mouth.     • nabumetone (RELAFEN) 500 MG tablet 2 tabs po BID with food for pain and inflammation 120 tablet 1   • Omega-3 Fatty Acids (FISH OIL) 1200 MG capsule capsule Take 1,200 mg by mouth Daily.     • prednisoLONE acetate (PRED FORTE) 1 % ophthalmic suspension      • saw palmetto 80 MG capsule Take 80 mg by mouth 2 (two) times a day.     • VITAMIN K PO Take 100 mg by mouth.     • [DISCONTINUED] atorvastatin (LIPITOR) 20 MG tablet Take 1 tablet by mouth Daily. 90 tablet 2   • [DISCONTINUED] Esomeprazole Magnesium (NEXIUM PO) Take 22.3 mg by mouth daily.     • [DISCONTINUED] loratadine-pseudoephedrine (CLARITIN-D 12-hour) 5-120 MG per 12 hr tablet Take 1 tablet by mouth 2 (Two) Times a Day. 90 tablet 1   • buPROPion SR (WELLBUTRIN SR) 150 MG 12 hr tablet One tab po QDay for depression 60 tablet 1   • [DISCONTINUED] guaiFENesin (MUCINEX) 600 MG 12 hr tablet Take 1,200 mg by mouth 2 (Two) Times a Day.       No facility-administered encounter medications on file as of 10/3/2018.        Reviewed use of high risk medication in the elderly: yes  Reviewed for potential of harmful drug interactions in the elderly: yes    Follow Up:  Return in about 6 months (around 4/3/2019) for Next scheduled follow up.     Patient  Instructions   Talk with your pharmacist about both the new shingles vaccine (Shingrix) and the Hepatitis A vaccine.  Both are two injections, six months apart.  I highly recommend this.      Try switching from Nexium/esomeprazole to Zantac/ranitidine 150 mg twice daily--After on that (both) for one week  Start taking Nexium every other day for two weeks then   Every third day for two weeks etc until you drop off of the Nexium.   If problems let me know    Keep working on sugar/starch restriction and increasing exercise/walking    Schedule follow-up with Mr Epley for 6months    Start bupropion in am for 3-4 days and then add second dose in afternoon/evening, before 8 pm.  Let me know how it is doing    Atorvastatin refills sent      An After Visit Summary and PPPS with all of these plans were given to the patient.

## 2018-12-18 ENCOUNTER — OFFICE VISIT (OUTPATIENT)
Dept: FAMILY MEDICINE CLINIC | Facility: CLINIC | Age: 68
End: 2018-12-18

## 2018-12-18 VITALS
DIASTOLIC BLOOD PRESSURE: 88 MMHG | WEIGHT: 215.1 LBS | TEMPERATURE: 98.1 F | OXYGEN SATURATION: 98 % | HEART RATE: 103 BPM | BODY MASS INDEX: 32.71 KG/M2 | SYSTOLIC BLOOD PRESSURE: 132 MMHG

## 2018-12-18 DIAGNOSIS — H69.82 EUSTACHIAN TUBE DYSFUNCTION, LEFT: ICD-10-CM

## 2018-12-18 DIAGNOSIS — R73.9 HYPERGLYCEMIA: ICD-10-CM

## 2018-12-18 DIAGNOSIS — H93.12 TINNITUS OF LEFT EAR: ICD-10-CM

## 2018-12-18 DIAGNOSIS — H65.92 FLUID LEVEL BEHIND TYMPANIC MEMBRANE OF LEFT EAR: Primary | ICD-10-CM

## 2018-12-18 PROCEDURE — 99213 OFFICE O/P EST LOW 20 MIN: CPT | Performed by: FAMILY MEDICINE

## 2018-12-18 RX ORDER — AMOXICILLIN 875 MG/1
TABLET, COATED ORAL
Qty: 10 TABLET | Refills: 0 | Status: SHIPPED | OUTPATIENT
Start: 2018-12-18 | End: 2019-04-03

## 2018-12-18 RX ORDER — PREDNISONE 20 MG/1
TABLET ORAL
Qty: 15 TABLET | Refills: 0 | Status: SHIPPED | OUTPATIENT
Start: 2018-12-18 | End: 2019-04-03

## 2018-12-18 NOTE — PROGRESS NOTES
Subjective   Juan C Kumar is a 68 y.o. male.     He's been having trouble for the last couple of weeks with intermittent left ear congestion and a buzz in the ear.  No fever.  He's been using Claritin-D without resolution.  No fever.  Not sick otherwise.  Says the Wellbutrin has done exactly what he wants to do.  His mood is good and is stabilized.  He notes that this is the time of year that he usually crashes and he is doing well.  Had his flu shot.         The following portions of the patient's history were reviewed and updated as appropriate: allergies, current medications, past family history, past medical history, past social history, past surgical history and problem list.    Review of Systems   Constitutional: Negative for chills and fever.   HENT: Positive for postnasal drip (mild) and tinnitus (left buzz). Negative for congestion, ear pain (pressure on left, intermittent), rhinorrhea and sore throat.    Eyes: Negative for discharge and visual disturbance.   Respiratory: Negative for cough and shortness of breath.    Cardiovascular: Negative for chest pain.   Gastrointestinal: Negative for abdominal pain, constipation, diarrhea, nausea and vomiting.   Endocrine: Negative for polydipsia.   Genitourinary: Negative for dysuria and hematuria.   Musculoskeletal: Negative for arthralgias and myalgias.   Skin: Negative for rash.   Allergic/Immunologic: Negative.    Neurological: Negative for weakness, numbness and headaches.   Hematological: Does not bruise/bleed easily.   Psychiatric/Behavioral: Negative for dysphoric mood (good w med). The patient is not nervous/anxious.    All other systems reviewed and are negative.      Objective   Physical Exam   Constitutional: He is oriented to person, place, and time. He appears well-developed and well-nourished.   HENT:   Head: Normocephalic and atraumatic.   Right Ear: Tympanic membrane and external ear normal.   Left Ear: External ear normal. Tympanic membrane is  injected and retracted. A middle ear effusion is present.   Nose: Nose normal.   Mouth/Throat: Uvula is midline and oropharynx is clear and moist. No oropharyngeal exudate.   Eyes: Conjunctivae, EOM and lids are normal. Pupils are equal, round, and reactive to light. Right eye exhibits no discharge. Left eye exhibits no discharge. No scleral icterus.   Neck: Trachea normal, normal range of motion and phonation normal. Neck supple. No thyromegaly present.   Cardiovascular: Normal rate, regular rhythm and normal heart sounds. Exam reveals no gallop and no friction rub.   No murmur heard.  Pulmonary/Chest: Effort normal and breath sounds normal. No stridor. He has no wheezes. He has no rales.   Abdominal: Soft. He exhibits no distension. There is no tenderness.   Musculoskeletal: Normal range of motion. He exhibits no edema.   Lymphadenopathy:     He has no cervical adenopathy.   Neurological: He is alert and oriented to person, place, and time. He has normal strength. No cranial nerve deficit.   Skin: Skin is warm, dry and intact. No petechiae and no rash noted. No cyanosis. Nails show no clubbing.   Psychiatric: He has a normal mood and affect. His speech is normal and behavior is normal. Judgment and thought content normal. Cognition and memory are normal.   Nursing note and vitals reviewed.      Assessment/Plan   Juan C was seen today for tinnitus and sinus problem.    Diagnoses and all orders for this visit:    Fluid level behind tympanic membrane of left ear  -     amoxicillin (AMOXIL) 875 MG tablet; One PO BID until completed for infection  -     predniSONE (DELTASONE) 20 MG tablet; One tablet PO TID with meals x 5 days for inflammation    Eustachian tube dysfunction, left  -     amoxicillin (AMOXIL) 875 MG tablet; One PO BID until completed for infection  -     predniSONE (DELTASONE) 20 MG tablet; One tablet PO TID with meals x 5 days for inflammation    Tinnitus of left ear  -     amoxicillin (AMOXIL) 875 MG  tablet; One PO BID until completed for infection  -     predniSONE (DELTASONE) 20 MG tablet; One tablet PO TID with meals x 5 days for inflammation      Patient Instructions   Antibiotic and prednisone for 5 days  No ibuprofen or naproxen or nabumetone while on prednisone  To lessen risk of antibiotic-associated colitis/diarrhea, take a probiotic (like ALIGN,  available over the counter)-twice daily for two weeks and then once daily for two weeks.  Fermented foods like active culture yogurt or RAW sauerkraut are even better    Continue Claritin-D  Add plain Mucinex/guaifenesin 1200 mg twice daily  Lots of fluids    Vaporizer/humidifier to provide enough humidity in sleeping room that you can awaken in the morning without having dry mouth or nose.  Continue this until the air conditioning comes on in the spring.  Will need to keep clean to avoid mold.  Ultrasonic cool mist vaporizers are very effective and inexpensive.    Talk with your pharmacist about both the new shingles vaccine (Shingrix) and the Hepatitis A vaccine.  Both are two injections, six months apart.  I highly recommend this.    GET ON LIST FOR SHINGLES VACCINE SOMEWHERE          EMR Dragon/Transcription disclaimer:   Much of this encounter note is an electronic transcription/translation of spoken language to printed text. The electronic translation of spoken language may permit erroneous, or at times, nonsensical words or phrases to be inadvertently transcribed; Although I have reviewed the note for such errors, some may still exist. Please contact me with any questions or concerns about the conduct of this encounter note.

## 2018-12-18 NOTE — PATIENT INSTRUCTIONS
Antibiotic and prednisone for 5 days  No ibuprofen or naproxen or nabumetone while on prednisone  To lessen risk of antibiotic-associated colitis/diarrhea, take a probiotic (like ALIGN,  available over the counter)-twice daily for two weeks and then once daily for two weeks.  Fermented foods like active culture yogurt or RAW sauerkraut are even better    Continue Claritin-D  Add plain Mucinex/guaifenesin 1200 mg twice daily  Lots of fluids    Vaporizer/humidifier to provide enough humidity in sleeping room that you can awaken in the morning without having dry mouth or nose.  Continue this until the air conditioning comes on in the spring.  Will need to keep clean to avoid mold.  Ultrasonic cool mist vaporizers are very effective and inexpensive.    Talk with your pharmacist about both the new shingles vaccine (Shingrix) and the Hepatitis A vaccine.  Both are two injections, six months apart.  I highly recommend this.    GET ON LIST FOR SHINGLES VACCINE SOMEWHERE

## 2019-02-01 ENCOUNTER — TELEPHONE (OUTPATIENT)
Dept: FAMILY MEDICINE CLINIC | Facility: CLINIC | Age: 69
End: 2019-02-01

## 2019-02-01 RX ORDER — BUPROPION HYDROCHLORIDE 150 MG/1
TABLET, EXTENDED RELEASE ORAL
Qty: 60 TABLET | Refills: 3 | Status: SHIPPED | OUTPATIENT
Start: 2019-02-01 | End: 2019-04-03 | Stop reason: SDUPTHER

## 2019-02-15 ENCOUNTER — LAB (OUTPATIENT)
Dept: LAB | Facility: HOSPITAL | Age: 69
End: 2019-02-15

## 2019-02-15 ENCOUNTER — TRANSCRIBE ORDERS (OUTPATIENT)
Dept: ADMINISTRATIVE | Facility: HOSPITAL | Age: 69
End: 2019-02-15

## 2019-02-15 DIAGNOSIS — Z01.818 PRE-OP TESTING: Primary | ICD-10-CM

## 2019-02-15 DIAGNOSIS — Z01.818 PRE-OP TESTING: ICD-10-CM

## 2019-02-15 LAB
ANION GAP SERPL CALCULATED.3IONS-SCNC: 10.6 MMOL/L
BUN BLD-MCNC: 10 MG/DL (ref 8–23)
BUN/CREAT SERPL: 11.8 (ref 7–25)
CALCIUM SPEC-SCNC: 9.2 MG/DL (ref 8.6–10.5)
CHLORIDE SERPL-SCNC: 105 MMOL/L (ref 98–107)
CO2 SERPL-SCNC: 26.4 MMOL/L (ref 22–29)
CREAT BLD-MCNC: 0.85 MG/DL (ref 0.76–1.27)
GFR SERPL CREATININE-BSD FRML MDRD: 89 ML/MIN/1.73
GLUCOSE BLD-MCNC: 116 MG/DL (ref 65–99)
POTASSIUM BLD-SCNC: 4.2 MMOL/L (ref 3.5–5.2)
SODIUM BLD-SCNC: 142 MMOL/L (ref 136–145)

## 2019-02-15 PROCEDURE — 80048 BASIC METABOLIC PNL TOTAL CA: CPT

## 2019-02-15 PROCEDURE — 36415 COLL VENOUS BLD VENIPUNCTURE: CPT

## 2019-02-28 NOTE — TELEPHONE ENCOUNTER
3 total refills okay   Ventricular Rate : 103  Atrial Rate : 103  P-R Interval : 142  QRS Duration : 66  Q-T Interval : 318  QTC Calculation(Bezet) : 416  P Axis : 56  R Axis : 22  T Axis : 34  Diagnosis : Sinus tachycardia  Nonspecific T wave abnormality  Abnormal ECG    Confirmed by Ra Spencer ([id number]) on 2/12/2019 2:57:55 PM

## 2019-03-20 ENCOUNTER — TELEPHONE (OUTPATIENT)
Dept: FAMILY MEDICINE CLINIC | Facility: CLINIC | Age: 69
End: 2019-03-20

## 2019-03-20 NOTE — TELEPHONE ENCOUNTER
CARLIEI:    Pt stopped by for bp check. Says he was [place] the [place] at a different appt and they checked BP with wrist monitor. Reading was 139/80ish.    I manually checked BP and it was 140/70.

## 2019-03-27 ENCOUNTER — RESULTS ENCOUNTER (OUTPATIENT)
Dept: FAMILY MEDICINE CLINIC | Facility: CLINIC | Age: 69
End: 2019-03-27

## 2019-03-27 DIAGNOSIS — R73.9 HYPERGLYCEMIA: ICD-10-CM

## 2019-03-29 LAB
ALBUMIN SERPL-MCNC: 4.3 G/DL (ref 3.5–5.2)
ALBUMIN/GLOB SERPL: 1.7 G/DL
ALP SERPL-CCNC: 55 U/L (ref 39–117)
ALT SERPL-CCNC: 31 U/L (ref 1–41)
AST SERPL-CCNC: 21 U/L (ref 1–40)
BILIRUB SERPL-MCNC: 0.4 MG/DL (ref 0.2–1.2)
BUN SERPL-MCNC: 13 MG/DL (ref 8–23)
BUN/CREAT SERPL: 15.1 (ref 7–25)
CALCIUM SERPL-MCNC: 9.3 MG/DL (ref 8.6–10.5)
CHLORIDE SERPL-SCNC: 105 MMOL/L (ref 98–107)
CO2 SERPL-SCNC: 24 MMOL/L (ref 22–29)
CREAT SERPL-MCNC: 0.86 MG/DL (ref 0.76–1.27)
GLOBULIN SER CALC-MCNC: 2.5 GM/DL
GLUCOSE SERPL-MCNC: 110 MG/DL (ref 65–99)
HBA1C MFR BLD: 6.36 % (ref 4.8–5.6)
POTASSIUM SERPL-SCNC: 4.5 MMOL/L (ref 3.5–5.2)
PROT SERPL-MCNC: 6.8 G/DL (ref 6–8.5)
SODIUM SERPL-SCNC: 143 MMOL/L (ref 136–145)

## 2019-04-03 ENCOUNTER — OFFICE VISIT (OUTPATIENT)
Dept: FAMILY MEDICINE CLINIC | Facility: CLINIC | Age: 69
End: 2019-04-03

## 2019-04-03 VITALS
WEIGHT: 218 LBS | HEART RATE: 92 BPM | HEIGHT: 68 IN | BODY MASS INDEX: 33.04 KG/M2 | OXYGEN SATURATION: 92 % | DIASTOLIC BLOOD PRESSURE: 74 MMHG | SYSTOLIC BLOOD PRESSURE: 118 MMHG

## 2019-04-03 DIAGNOSIS — R73.01 IFG (IMPAIRED FASTING GLUCOSE): ICD-10-CM

## 2019-04-03 DIAGNOSIS — H93.12 TINNITUS OF LEFT EAR: ICD-10-CM

## 2019-04-03 DIAGNOSIS — J01.01 ACUTE RECURRENT MAXILLARY SINUSITIS: ICD-10-CM

## 2019-04-03 DIAGNOSIS — H68.103 EUSTACHIAN TUBE OBSTRUCTION, BILATERAL: ICD-10-CM

## 2019-04-03 DIAGNOSIS — E78.2 MIXED HYPERLIPIDEMIA: Primary | ICD-10-CM

## 2019-04-03 PROCEDURE — 99214 OFFICE O/P EST MOD 30 MIN: CPT | Performed by: NURSE PRACTITIONER

## 2019-04-03 RX ORDER — BUPROPION HYDROCHLORIDE 150 MG/1
TABLET, EXTENDED RELEASE ORAL
Qty: 90 TABLET | Refills: 1 | Status: SHIPPED | OUTPATIENT
Start: 2019-04-03 | End: 2019-11-18 | Stop reason: SDUPTHER

## 2019-04-03 RX ORDER — FLUTICASONE PROPIONATE 50 MCG
2 SPRAY, SUSPENSION (ML) NASAL DAILY
Qty: 3 BOTTLE | Refills: 3 | Status: SHIPPED | OUTPATIENT
Start: 2019-04-03 | End: 2019-10-28 | Stop reason: SDUPTHER

## 2019-04-03 RX ORDER — ATORVASTATIN CALCIUM 20 MG/1
TABLET, FILM COATED ORAL
Qty: 90 TABLET | Refills: 1 | Status: SHIPPED | OUTPATIENT
Start: 2019-04-03 | End: 2019-11-26 | Stop reason: SDUPTHER

## 2019-04-03 NOTE — PATIENT INSTRUCTIONS
Discharge instructions    Eustachian tube dysfunction  Flonase 2 sprays each nostril daily  3-month trial discontinue if no difference ear pressure sinus pressure    Seasonal allergies  Antihistamine of choice  If needed for seasonal  Nasal congestion not controlled adequately  As above plan    May add Nasalcrom nasal spray 2 sprays each nostril twice daily seasonally  Or prescription Spencer    Call for Rx    Caution with Sudafed monitor blood pressure    Recheck in 6 months

## 2019-04-08 NOTE — PROGRESS NOTES
Subjective   Juan C Kumar is a 68 y.o. male.     Hyperlipidemia mixed controlled medicationAtorvastatin no problem  Prediabetes trying to eat better  With impaired boosting glucose previous labs    Chronic sinus pause sure allergies         The following portions of the patient's history were reviewed and updated as appropriate: allergies, current medications, past family history, past medical history, past social history, past surgical history and problem list.    Review of Systems   Constitutional: Negative for fatigue and fever.   HENT: Negative.  Negative for trouble swallowing.    Eyes: Negative.    Respiratory: Negative.  Negative for cough and shortness of breath.    Cardiovascular: Negative for chest pain, palpitations and leg swelling.   Gastrointestinal: Negative.  Negative for abdominal pain.   Genitourinary: Negative.    Musculoskeletal: Negative.    Skin: Negative.    Neurological: Negative.  Negative for dizziness and confusion.   Psychiatric/Behavioral: Negative.        Objective   Physical Exam   Constitutional: He is oriented to person, place, and time. He appears well-developed and well-nourished. No distress.   HENT:   Head: Normocephalic and atraumatic.   Nose: Nose normal.   Mouth/Throat: Oropharynx is clear and moist.   Turbinates congested   Eyes: Conjunctivae are normal. Pupils are equal, round, and reactive to light. No scleral icterus.   Neck: Neck supple. No JVD present. No thyromegaly present.   Cardiovascular: Normal rate, regular rhythm and normal heart sounds. Exam reveals no gallop and no friction rub.   No murmur heard.  Pulmonary/Chest: Effort normal and breath sounds normal. No respiratory distress. He has no wheezes. He has no rales.   Abdominal: Soft. Bowel sounds are normal. He exhibits no distension and no mass. There is no tenderness. There is no guarding. No hernia.   Musculoskeletal: He exhibits no edema or tenderness.   Lymphadenopathy:     He has no cervical adenopathy.    Neurological: He is alert and oriented to person, place, and time. He has normal reflexes.   Skin: Skin is warm and dry. No rash noted. He is not diaphoretic. No erythema.   Psychiatric: He has a normal mood and affect. His behavior is normal. Judgment and thought content normal.   Vitals reviewed.        Assessment/Plan   Juan C was seen today for dr jenny frias.    Diagnoses and all orders for this visit:    Mixed hyperlipidemia    IFG (impaired fasting glucose)    Acute recurrent maxillary sinusitis    Eustachian tube obstruction, bilateral    Tinnitus of left ear  Comments:  ent work up neg     Other orders  -     atorvastatin (LIPITOR) 20 MG tablet; 1 QPM for lipids and risk reduction  -     buPROPion SR (WELLBUTRIN SR) 150 MG 12 hr tablet; One tab po QDay for depression  -     fluticasone (FLONASE) 50 MCG/ACT nasal spray; 2 sprays into the nostril(s) as directed by provider Daily.                  Discharge instructions    Eustachian tube dysfunction  Flonase 2 sprays each nostril daily  3-month trial discontinue if no difference ear pressure sinus pressure    Seasonal allergies  Antihistamine of choice  If needed for seasonal  Nasal congestion not controlled adequately  As above plan    May add Nasalcrom nasal spray 2 sprays each nostril twice daily seasonally  Or prescription Spencer    Call for Rx    Caution with Sudafed monitor blood pressure    Recheck in 6 month

## 2019-09-25 DIAGNOSIS — R73.01 IFG (IMPAIRED FASTING GLUCOSE): ICD-10-CM

## 2019-09-25 DIAGNOSIS — K21.9 GASTROESOPHAGEAL REFLUX DISEASE WITHOUT ESOPHAGITIS: ICD-10-CM

## 2019-09-25 DIAGNOSIS — Z00.00 HEALTH CARE MAINTENANCE: ICD-10-CM

## 2019-09-25 DIAGNOSIS — E78.2 MIXED HYPERLIPIDEMIA: Primary | ICD-10-CM

## 2019-09-25 DIAGNOSIS — Z12.5 PROSTATE CANCER SCREENING: ICD-10-CM

## 2019-10-18 LAB
ALBUMIN SERPL-MCNC: 4.5 G/DL (ref 3.5–5.2)
ALBUMIN/GLOB SERPL: 1.8 G/DL
ALP SERPL-CCNC: 60 U/L (ref 39–117)
ALT SERPL-CCNC: 26 U/L (ref 1–41)
APPEARANCE UR: CLEAR
AST SERPL-CCNC: 18 U/L (ref 1–40)
BASOPHILS # BLD AUTO: 0.07 10*3/MM3 (ref 0–0.2)
BASOPHILS NFR BLD AUTO: 1.1 % (ref 0–1.5)
BILIRUB SERPL-MCNC: 0.4 MG/DL (ref 0.2–1.2)
BILIRUB UR QL STRIP: NEGATIVE
BUN SERPL-MCNC: 12 MG/DL (ref 8–23)
BUN/CREAT SERPL: 14.3 (ref 7–25)
CALCIUM SERPL-MCNC: 9.2 MG/DL (ref 8.6–10.5)
CHLORIDE SERPL-SCNC: 102 MMOL/L (ref 98–107)
CHOLEST SERPL-MCNC: 160 MG/DL (ref 0–200)
CO2 SERPL-SCNC: 23.9 MMOL/L (ref 22–29)
COLOR UR: YELLOW
CREAT SERPL-MCNC: 0.84 MG/DL (ref 0.76–1.27)
EOSINOPHIL # BLD AUTO: 0.26 10*3/MM3 (ref 0–0.4)
EOSINOPHIL NFR BLD AUTO: 4.1 % (ref 0.3–6.2)
ERYTHROCYTE [DISTWIDTH] IN BLOOD BY AUTOMATED COUNT: 12.4 % (ref 12.3–15.4)
GLOBULIN SER CALC-MCNC: 2.5 GM/DL
GLUCOSE SERPL-MCNC: 117 MG/DL (ref 65–99)
GLUCOSE UR QL: NEGATIVE
HBA1C MFR BLD: 6.4 % (ref 4.8–5.6)
HCT VFR BLD AUTO: 47.9 % (ref 37.5–51)
HDLC SERPL-MCNC: 50 MG/DL (ref 40–60)
HGB BLD-MCNC: 16 G/DL (ref 13–17.7)
HGB UR QL STRIP: NEGATIVE
IMM GRANULOCYTES # BLD AUTO: 0.03 10*3/MM3 (ref 0–0.05)
IMM GRANULOCYTES NFR BLD AUTO: 0.5 % (ref 0–0.5)
KETONES UR QL STRIP: NEGATIVE
LDLC SERPL CALC-MCNC: 88 MG/DL (ref 0–100)
LDLC/HDLC SERPL: 1.76 {RATIO}
LEUKOCYTE ESTERASE UR QL STRIP: NEGATIVE
LYMPHOCYTES # BLD AUTO: 2.16 10*3/MM3 (ref 0.7–3.1)
LYMPHOCYTES NFR BLD AUTO: 34.1 % (ref 19.6–45.3)
MCH RBC QN AUTO: 30.7 PG (ref 26.6–33)
MCHC RBC AUTO-ENTMCNC: 33.4 G/DL (ref 31.5–35.7)
MCV RBC AUTO: 91.8 FL (ref 79–97)
MONOCYTES # BLD AUTO: 0.87 10*3/MM3 (ref 0.1–0.9)
MONOCYTES NFR BLD AUTO: 13.7 % (ref 5–12)
NEUTROPHILS # BLD AUTO: 2.95 10*3/MM3 (ref 1.7–7)
NEUTROPHILS NFR BLD AUTO: 46.5 % (ref 42.7–76)
NITRITE UR QL STRIP: NEGATIVE
NRBC BLD AUTO-RTO: 0 /100 WBC (ref 0–0.2)
PH UR STRIP: 5.5 [PH] (ref 5–8)
PLATELET # BLD AUTO: 278 10*3/MM3 (ref 140–450)
POTASSIUM SERPL-SCNC: 4.4 MMOL/L (ref 3.5–5.2)
PROT SERPL-MCNC: 7 G/DL (ref 6–8.5)
PROT UR QL STRIP: NEGATIVE
PSA SERPL-MCNC: 0.51 NG/ML (ref 0–4)
RBC # BLD AUTO: 5.22 10*6/MM3 (ref 4.14–5.8)
SODIUM SERPL-SCNC: 139 MMOL/L (ref 136–145)
SP GR UR: 1.02 (ref 1–1.03)
TRIGL SERPL-MCNC: 110 MG/DL (ref 0–150)
TSH SERPL DL<=0.005 MIU/L-ACNC: 2.39 UIU/ML (ref 0.27–4.2)
UROBILINOGEN UR STRIP-MCNC: NORMAL MG/DL
VLDLC SERPL CALC-MCNC: 22 MG/DL
WBC # BLD AUTO: 6.34 10*3/MM3 (ref 3.4–10.8)

## 2019-10-22 ENCOUNTER — OFFICE VISIT (OUTPATIENT)
Dept: FAMILY MEDICINE CLINIC | Facility: CLINIC | Age: 69
End: 2019-10-22

## 2019-10-22 VITALS
OXYGEN SATURATION: 96 % | WEIGHT: 216 LBS | BODY MASS INDEX: 32.74 KG/M2 | HEART RATE: 92 BPM | SYSTOLIC BLOOD PRESSURE: 118 MMHG | HEIGHT: 68 IN | DIASTOLIC BLOOD PRESSURE: 80 MMHG

## 2019-10-22 DIAGNOSIS — Z23 NEED FOR IMMUNIZATION AGAINST INFLUENZA: ICD-10-CM

## 2019-10-22 DIAGNOSIS — R73.03 PREDIABETES: ICD-10-CM

## 2019-10-22 DIAGNOSIS — E78.2 MIXED HYPERLIPIDEMIA: Primary | ICD-10-CM

## 2019-10-22 DIAGNOSIS — F32.A DEPRESSION, UNSPECIFIED DEPRESSION TYPE: ICD-10-CM

## 2019-10-22 DIAGNOSIS — R73.01 IFG (IMPAIRED FASTING GLUCOSE): ICD-10-CM

## 2019-10-22 PROCEDURE — 99213 OFFICE O/P EST LOW 20 MIN: CPT | Performed by: NURSE PRACTITIONER

## 2019-10-22 PROCEDURE — G0008 ADMIN INFLUENZA VIRUS VAC: HCPCS | Performed by: NURSE PRACTITIONER

## 2019-10-22 PROCEDURE — 90653 IIV ADJUVANT VACCINE IM: CPT | Performed by: NURSE PRACTITIONER

## 2019-10-22 NOTE — PATIENT INSTRUCTIONS
Vaccines hepatitis A vac x2  Shingles vaccine when available Shingrix  Follow-up in 6 months    Decreased processed breads positives as discussed walking when able

## 2019-10-22 NOTE — PROGRESS NOTES
Subjective   Juan C Kumar is a 69 y.o. male.     Very pleasant gentleman here follow-up mixed hyperlipidemia.  As well as impaired fasting glucose.  Most recent labs his A1c has increased to 6.4   he has been under quite a bit of stress the last several months his wife is not doing as well.  She was diagnosed with appendiceal cancer 6 years ago, unfortunately has metastasized, and not responsive to treatment and she just signed up for hospice  He is trying to be active but not able to exercise at this time and is picked up some weight.  His numbers are close to diabetes.    Presently no acute complaints no chest pain no shortness of breath    Chronic depression  He is doing well with bupropion 150 daily considering his situation  He wants to continue same but would let me know if we should increase this          Hyperlipidemia   Pertinent negatives include no chest pain or shortness of breath.        The following portions of the patient's history were reviewed and updated as appropriate: allergies, current medications, past family history, past medical history, past social history, past surgical history and problem list.    Review of Systems   Constitutional: Negative for fatigue and fever.   HENT: Negative.  Negative for trouble swallowing.    Eyes: Negative.    Respiratory: Negative.  Negative for cough and shortness of breath.    Cardiovascular: Negative for chest pain, palpitations and leg swelling.   Gastrointestinal: Negative.  Negative for abdominal pain.   Genitourinary: Negative.    Musculoskeletal: Negative.    Skin: Negative.    Neurological: Negative.  Negative for dizziness and confusion.   Psychiatric/Behavioral: Negative.        Objective   Physical Exam   Constitutional: He is oriented to person, place, and time. He appears well-developed and well-nourished. No distress.   HENT:   Head: Normocephalic and atraumatic.   Nose: Nose normal.   Mouth/Throat: Oropharynx is clear and moist.   Eyes:  Conjunctivae are normal. Pupils are equal, round, and reactive to light.   Neck: Neck supple. No JVD present.   Cardiovascular: Normal rate, regular rhythm and normal heart sounds.   No murmur heard.  Pulmonary/Chest: Effort normal and breath sounds normal. No respiratory distress. He has no wheezes.   Abdominal: Soft. Bowel sounds are normal. He exhibits no distension and no mass. There is no tenderness. There is no guarding. No hernia.   Musculoskeletal: He exhibits no edema or tenderness.   Lymphadenopathy:     He has no cervical adenopathy.   Neurological: He is alert and oriented to person, place, and time.   Skin: Skin is warm and dry. He is not diaphoretic.   Psychiatric: He has a normal mood and affect. His behavior is normal. Judgment and thought content normal.   Vitals reviewed.        Assessment/Plan   Juan C was seen today for hyperlipidemia.    Diagnoses and all orders for this visit:    Mixed hyperlipidemia    Need for immunization against influenza  -     Fluad Quad >65 years (3136-2753)    IFG (impaired fasting glucose)    Prediabetes    Mixed hyperlipidemia impaired fasting glucose  Just to the best he can do at this time  Walk any time he can  Spend time with his wife  Watch his diet the best he can  Not too worried about this temporarily  Let me know if I can do anything for him  Encouraged patient              Patient Instructions   Vaccines hepatitis A vac x2  Shingles vaccine when available Shingrix  Follow-up in 6 months    Decreased processed breads positives as discussed walking when able

## 2019-10-28 RX ORDER — FLUTICASONE PROPIONATE 50 MCG
2 SPRAY, SUSPENSION (ML) NASAL DAILY
Qty: 3 BOTTLE | Refills: 4 | Status: SHIPPED | OUTPATIENT
Start: 2019-10-28 | End: 2021-03-01

## 2019-11-01 ENCOUNTER — TELEPHONE (OUTPATIENT)
Dept: FAMILY MEDICINE CLINIC | Facility: CLINIC | Age: 69
End: 2019-11-01

## 2019-11-01 NOTE — TELEPHONE ENCOUNTER
Pt calling into office, wife is under Hospice care and pt states he is under extreme stress and would like rx for Xanax sent to  [address] AT [facility] [address] - [phone]  - [phone] FX. Pt states he has discussed this during last visit.

## 2019-11-18 ENCOUNTER — OFFICE VISIT (OUTPATIENT)
Dept: FAMILY MEDICINE CLINIC | Facility: CLINIC | Age: 69
End: 2019-11-18

## 2019-11-18 VITALS
BODY MASS INDEX: 33.19 KG/M2 | HEIGHT: 68 IN | HEART RATE: 93 BPM | DIASTOLIC BLOOD PRESSURE: 70 MMHG | WEIGHT: 219 LBS | OXYGEN SATURATION: 94 % | SYSTOLIC BLOOD PRESSURE: 138 MMHG

## 2019-11-18 DIAGNOSIS — F32.0 CURRENT MILD EPISODE OF MAJOR DEPRESSIVE DISORDER, UNSPECIFIED WHETHER RECURRENT (HCC): ICD-10-CM

## 2019-11-18 DIAGNOSIS — F43.21 GRIEF: Primary | ICD-10-CM

## 2019-11-18 PROCEDURE — 99214 OFFICE O/P EST MOD 30 MIN: CPT | Performed by: NURSE PRACTITIONER

## 2019-11-18 RX ORDER — NABUMETONE 500 MG/1
TABLET, FILM COATED ORAL
Qty: 120 TABLET | Refills: 2 | Status: SHIPPED | OUTPATIENT
Start: 2019-11-18 | End: 2020-12-14

## 2019-11-18 RX ORDER — BUPROPION HYDROCHLORIDE 150 MG/1
TABLET, EXTENDED RELEASE ORAL
Qty: 90 TABLET | Refills: 1 | Status: SHIPPED | OUTPATIENT
Start: 2019-11-18 | End: 2020-09-25

## 2019-11-18 NOTE — PROGRESS NOTES
"Subjective   Juan C Kumar is a 69 y.o. male.     patient is here to discuss grief, unfortunately his wife recently passed appendiceal cancer with metastasis to her liver.  She was with [facility] her quality of life was poo  Since the , feels like he is doing okay considering.  Not a lot of experience with this he lost his brother he was not close to, his parents are still alive in their  mid 90s he is got quite a bit of family support his daughter is staying with him now she is 36  Questions if he should be started on antidepressant or Xanax his family when him to ask he does not have any panic attacks he has no severe anxiety  Certainly has a lot of grief but he is able to express his feelings his [place] has a support group he is thinking about joining the spend a lot of time together  Site seeing with birds bird watching .he is not sure if he knows with a do with himself now that she is gone    No suicidal ideation no agitation no panic attacks  Bupropion has helped in the past continues to take             /70   Pulse 93   Ht 172.7 cm (67.99\")   Wt 99.3 kg (219 lb)   SpO2 94%   BMI 33.31 kg/m²       The following portions of the patient's history were reviewed and updated as appropriate: allergies, current medications, past family history, past medical history, past social history, past surgical history and problem list.    Review of Systems   Constitutional: Negative for fatigue and fever.   HENT: Negative.  Negative for trouble swallowing.    Eyes: Negative.    Respiratory: Negative.  Negative for cough and shortness of breath.    Cardiovascular: Negative for chest pain, palpitations and leg swelling.   Gastrointestinal: Negative.  Negative for abdominal pain.   Genitourinary: Negative.    Musculoskeletal: Negative.    Skin: Negative.    Neurological: Negative.  Negative for dizziness and confusion.   Psychiatric/Behavioral: Negative.  Positive for depressed mood.        Grief   All other " systems reviewed and are negative.      Objective   Physical Exam   Constitutional: He appears well-developed and well-nourished.   HENT:   Head: Normocephalic and atraumatic.   Eyes: Conjunctivae are normal. Pupils are equal, round, and reactive to light.   Neck: Neck supple.   Pulmonary/Chest: Effort normal.   Skin: Skin is warm and dry. No rash noted. No erythema.   Psychiatric: His behavior is normal. Judgment and thought content normal.   Tearful at times appropriate for situation   Vitals reviewed.        Assessment/Plan   Juan C was seen today for discuss medication for grief.    Diagnoses and all orders for this visit:    Grief    Current mild episode of major depressive disorder, unspecified whether recurrent (CMS/[facility])    Other orders  -     buPROPion SR (WELLBUTRIN SR) 150 MG 12 hr tablet; One tab po QDay for depression  -     nabumetone (RELAFEN) 500 MG tablet; 1 to 2 tablets twice a day as needed for pain take with food and water      Patient is without distress he is presently grieving so far appropriately we discussed antidepressants and appropriate grief he has mild stable depression  At this time he does not think he needs antidepressants and I would agree  To bring him back in a few weeks to make sure he is doing okay he will follow instructions below  Return office any time to talk or if he thinks he is not doing well  Strongly advised grief counseling and discussed the normal process agree  Office visit 30-minute grade 50% counseling            Patient Instructions   Discharge instructions  Recommend grief counseling  Close communication with your family and friends  Scheduled activity weekly for the next 6 months or year  Continue hobbies  Create new hobbies  Walking daily some, exercise daily start gradually even 5 minutes  Gradually build up try something fun such as joining a gym something new    Follow-up 6 weeks  Sooner for any problems    Lots of water if taking nabumetone check blood  pressure take with food discontinue if GI upset

## 2019-11-18 NOTE — PATIENT INSTRUCTIONS
Discharge instructions  Recommend grief counseling  Close communication with your family and friends  Scheduled activity weekly for the next 6 months or year  Continue hobbies  Create new hobbies  Walking daily some, exercise daily start gradually even 5 minutes  Gradually build up try something fun such as joining a gym something new    Follow-up 6 weeks  Sooner for any problems    Lots of water if taking nabumetone check blood pressure take with food discontinue if GI upset

## 2019-11-26 RX ORDER — ATORVASTATIN CALCIUM 20 MG/1
TABLET, FILM COATED ORAL
Qty: 90 TABLET | Refills: 1 | Status: SHIPPED | OUTPATIENT
Start: 2019-11-26 | End: 2020-05-28

## 2020-02-03 ENCOUNTER — TELEPHONE (OUTPATIENT)
Dept: FAMILY MEDICINE CLINIC | Facility: CLINIC | Age: 70
End: 2020-02-03

## 2020-02-03 NOTE — TELEPHONE ENCOUNTER
Pt is requesting a rx for Claritin D.    I did not see this rx on pt's med list.    Pls advise.    Asa #[phone].    Pt can be reached #[phone].

## 2020-02-03 NOTE — TELEPHONE ENCOUNTER
He can have Claritin-D 12-Hour  One tablet every 12 hours as needed for nasal congestion  Dispense  20  +2 refills    Tell patient to discontinue if any palpitations or insomnia  He should check his blood pressure weekly and discontinue if elevated    Thank you

## 2020-04-21 ENCOUNTER — TELEMEDICINE (OUTPATIENT)
Dept: FAMILY MEDICINE CLINIC | Facility: CLINIC | Age: 70
End: 2020-04-21

## 2020-04-21 DIAGNOSIS — F32.0 MILD MAJOR DEPRESSION (HCC): Primary | ICD-10-CM

## 2020-04-21 DIAGNOSIS — R73.03 PREDIABETES: ICD-10-CM

## 2020-04-21 DIAGNOSIS — N52.9 ERECTILE DYSFUNCTION, UNSPECIFIED ERECTILE DYSFUNCTION TYPE: ICD-10-CM

## 2020-04-21 DIAGNOSIS — F43.21 GRIEF: ICD-10-CM

## 2020-04-21 DIAGNOSIS — R03.0 ELEVATED BLOOD PRESSURE READING WITHOUT DIAGNOSIS OF HYPERTENSION: ICD-10-CM

## 2020-04-21 PROCEDURE — 99214 OFFICE O/P EST MOD 30 MIN: CPT | Performed by: NURSE PRACTITIONER

## 2020-04-21 RX ORDER — SILDENAFIL 100 MG/1
TABLET, FILM COATED ORAL
Qty: 6 TABLET | Refills: 11 | Status: SHIPPED | OUTPATIENT
Start: 2020-04-21 | End: 2021-06-16

## 2020-04-21 NOTE — PROGRESS NOTES
Subjective   Juan C Kumar is a 69 y.o. male.     Mild major depression grief patient is taking bupropion his wife passed away last year with cancer appendiceal cancer  Seems to be doing better had quite a hard time improving  Would like to continue bupropion for per my advice    New relationship first time he is attempted intimacy many years  Did not have any success  We discussed treatment generic Viagra sildenafil  Risk-benefit  He takes no nitrates for chest pain  Any erection greater than 4 hours ER  He is exercising being active eating much better lost about 10 pounds because improved diet  Feeling better with the spring he still maintaining social distancing      Depression  Grief    145/84 recent blood pressure reading, he checked his blood pressure because of erectile issues  No history of hypertension  We discussed normal blood pressure readings    History of prediabetes eating better as above hyperlipidemia mixed      teleh           There were no vitals taken for this visit.      The following portions of the patient's history were reviewed and updated as appropriate: allergies, current medications, past family history, past medical history, past social history, past surgical history and problem list.    Review of Systems   Constitutional: Negative for fatigue and fever.   HENT: Negative.  Negative for trouble swallowing.    Eyes: Negative.    Respiratory: Negative.  Negative for cough and shortness of breath.    Cardiovascular: Negative for chest pain, palpitations and leg swelling.   Gastrointestinal: Negative.  Negative for abdominal pain.   Genitourinary: Negative.    Musculoskeletal: Negative.    Skin: Negative.    Neurological: Negative.  Negative for dizziness and confusion.   Psychiatric/Behavioral: Negative.        Objective   Physical Exam   Constitutional: He appears well-developed and well-nourished.   Pleasant appears well   HENT:   Head: Normocephalic and atraumatic.   Eyes: Pupils are  equal, round, and reactive to light. Conjunctivae are normal.   Neck: Neck supple.   Pulmonary/Chest: Effort normal.   No respiratory distress   Skin: Skin is warm and dry. No rash noted. No erythema.   Psychiatric: He has a normal mood and affect. His behavior is normal. Judgment and thought content normal.   Vitals reviewed.        Assessment/Plan   Diagnoses and all orders for this visit:    Mild major depression (CMS/HCC)    Grief    Erectile dysfunction, unspecified erectile dysfunction type    Elevated blood pressure reading without diagnosis of hypertension    Prediabetes    Other orders  -     sildenafil (Viagra) 100 MG tablet; 1/2 to 1 tablet by mouth approximately 1 hour prior to intimacy as needed daily      Grief depression improving he will continue bupropion risk-benefit Viagra discussed expectations continue therapeutic lifestyle changes reassurance              There are no Patient Instructions on file for this visit.

## 2020-04-21 NOTE — PATIENT INSTRUCTIONS
Discharge instructions    Continue healthy diet regular exercise  Good sleep  Decreasing processed sweets processed foods  Recheck blood pressure  Couple times a week the next 2 to 3 weeks  Generic Viagra 1/2 tablet or 1 tablet  Approximately 1 hour prior to need    Any erection greater than 4 hours not going away may become painful must go to the ER    Follow-up 3 to 4 weeks with blood pressure readings  Continue bupropion    Outpatient lab in a couple months  Otherwise follow-up in the office in 4 months thank you!!

## 2020-04-27 RX ORDER — AZELASTINE 1 MG/ML
2 SPRAY, METERED NASAL 2 TIMES DAILY
Qty: 1 EACH | Refills: 12 | Status: SHIPPED | OUTPATIENT
Start: 2020-04-27 | End: 2022-10-19 | Stop reason: SDUPTHER

## 2020-05-18 DIAGNOSIS — Z00.00 HEALTH CARE MAINTENANCE: ICD-10-CM

## 2020-05-18 DIAGNOSIS — E78.2 MIXED HYPERLIPIDEMIA: Primary | ICD-10-CM

## 2020-05-18 DIAGNOSIS — F32.0 MILD MAJOR DEPRESSION (HCC): ICD-10-CM

## 2020-05-18 DIAGNOSIS — R03.0 ELEVATED BLOOD PRESSURE READING WITHOUT DIAGNOSIS OF HYPERTENSION: ICD-10-CM

## 2020-05-18 DIAGNOSIS — R73.01 IFG (IMPAIRED FASTING GLUCOSE): ICD-10-CM

## 2020-05-21 DIAGNOSIS — E78.2 MIXED HYPERLIPIDEMIA: ICD-10-CM

## 2020-05-21 DIAGNOSIS — R03.0 ELEVATED BLOOD PRESSURE READING WITHOUT DIAGNOSIS OF HYPERTENSION: ICD-10-CM

## 2020-05-21 DIAGNOSIS — Z00.00 HEALTH CARE MAINTENANCE: ICD-10-CM

## 2020-05-21 DIAGNOSIS — R73.01 IFG (IMPAIRED FASTING GLUCOSE): ICD-10-CM

## 2020-05-21 DIAGNOSIS — F32.0 MILD MAJOR DEPRESSION (HCC): ICD-10-CM

## 2020-05-23 LAB
ALBUMIN SERPL-MCNC: 4.6 G/DL (ref 3.5–5.2)
ALBUMIN/GLOB SERPL: 1.9 G/DL
ALP SERPL-CCNC: 59 U/L (ref 39–117)
ALT SERPL-CCNC: 25 U/L (ref 1–41)
APPEARANCE UR: CLEAR
AST SERPL-CCNC: 22 U/L (ref 1–40)
BASOPHILS # BLD AUTO: 0.09 10*3/MM3 (ref 0–0.2)
BASOPHILS NFR BLD AUTO: 1.3 % (ref 0–1.5)
BILIRUB SERPL-MCNC: 0.6 MG/DL (ref 0.2–1.2)
BILIRUB UR QL STRIP: NEGATIVE
BUN SERPL-MCNC: 12 MG/DL (ref 8–23)
BUN/CREAT SERPL: 14 (ref 7–25)
CALCIUM SERPL-MCNC: 9.6 MG/DL (ref 8.6–10.5)
CHLORIDE SERPL-SCNC: 102 MMOL/L (ref 98–107)
CHOLEST SERPL-MCNC: 136 MG/DL (ref 0–200)
CO2 SERPL-SCNC: 29.5 MMOL/L (ref 22–29)
COLOR UR: ABNORMAL
CREAT SERPL-MCNC: 0.86 MG/DL (ref 0.76–1.27)
EOSINOPHIL # BLD AUTO: 0.27 10*3/MM3 (ref 0–0.4)
EOSINOPHIL NFR BLD AUTO: 3.9 % (ref 0.3–6.2)
ERYTHROCYTE [DISTWIDTH] IN BLOOD BY AUTOMATED COUNT: 12.2 % (ref 12.3–15.4)
GLOBULIN SER CALC-MCNC: 2.4 GM/DL
GLUCOSE SERPL-MCNC: 123 MG/DL (ref 65–99)
GLUCOSE UR QL: NEGATIVE
HBA1C MFR BLD: 6.3 % (ref 4.8–5.6)
HCT VFR BLD AUTO: 47.3 % (ref 37.5–51)
HDLC SERPL-MCNC: 48 MG/DL (ref 40–60)
HGB BLD-MCNC: 15.8 G/DL (ref 13–17.7)
HGB UR QL STRIP: NEGATIVE
IMM GRANULOCYTES # BLD AUTO: 0.02 10*3/MM3 (ref 0–0.05)
IMM GRANULOCYTES NFR BLD AUTO: 0.3 % (ref 0–0.5)
KETONES UR QL STRIP: NEGATIVE
LDLC SERPL CALC-MCNC: 76 MG/DL (ref 0–100)
LDLC/HDLC SERPL: 1.58 {RATIO}
LEUKOCYTE ESTERASE UR QL STRIP: NEGATIVE
LYMPHOCYTES # BLD AUTO: 1.91 10*3/MM3 (ref 0.7–3.1)
LYMPHOCYTES NFR BLD AUTO: 27.8 % (ref 19.6–45.3)
MCH RBC QN AUTO: 30.6 PG (ref 26.6–33)
MCHC RBC AUTO-ENTMCNC: 33.4 G/DL (ref 31.5–35.7)
MCV RBC AUTO: 91.7 FL (ref 79–97)
MONOCYTES # BLD AUTO: 0.99 10*3/MM3 (ref 0.1–0.9)
MONOCYTES NFR BLD AUTO: 14.4 % (ref 5–12)
NEUTROPHILS # BLD AUTO: 3.58 10*3/MM3 (ref 1.7–7)
NEUTROPHILS NFR BLD AUTO: 52.3 % (ref 42.7–76)
NITRITE UR QL STRIP: NEGATIVE
NRBC BLD AUTO-RTO: 0 /100 WBC (ref 0–0.2)
PH UR STRIP: 6 [PH] (ref 5–8)
PLATELET # BLD AUTO: 304 10*3/MM3 (ref 140–450)
POTASSIUM SERPL-SCNC: 4.7 MMOL/L (ref 3.5–5.2)
PROT SERPL-MCNC: 7 G/DL (ref 6–8.5)
PROT UR QL STRIP: NEGATIVE
RBC # BLD AUTO: 5.16 10*6/MM3 (ref 4.14–5.8)
SODIUM SERPL-SCNC: 139 MMOL/L (ref 136–145)
SP GR UR: 1.02 (ref 1–1.03)
TRIGL SERPL-MCNC: 62 MG/DL (ref 0–150)
UROBILINOGEN UR STRIP-MCNC: ABNORMAL MG/DL
VLDLC SERPL CALC-MCNC: 12.4 MG/DL (ref 5–40)
WBC # BLD AUTO: 6.86 10*3/MM3 (ref 3.4–10.8)

## 2020-05-27 ENCOUNTER — TELEMEDICINE (OUTPATIENT)
Dept: FAMILY MEDICINE CLINIC | Facility: CLINIC | Age: 70
End: 2020-05-27

## 2020-05-27 DIAGNOSIS — Z00.00 MEDICARE ANNUAL WELLNESS VISIT, SUBSEQUENT: Primary | ICD-10-CM

## 2020-05-27 PROCEDURE — G0439 PPPS, SUBSEQ VISIT: HCPCS | Performed by: NURSE PRACTITIONER

## 2020-05-28 RX ORDER — ATORVASTATIN CALCIUM 20 MG/1
TABLET, FILM COATED ORAL
Qty: 90 TABLET | Refills: 0 | Status: SHIPPED | OUTPATIENT
Start: 2020-05-28 | End: 2020-06-10

## 2020-06-10 RX ORDER — ATORVASTATIN CALCIUM 20 MG/1
TABLET, FILM COATED ORAL
Qty: 90 TABLET | Refills: 0 | Status: SHIPPED | OUTPATIENT
Start: 2020-06-10 | End: 2020-11-24

## 2020-06-18 RX ORDER — LORATADINE AND PSEUDOEPHEDRINE SULFATE 5; 120 MG/1; MG/1
TABLET, EXTENDED RELEASE ORAL
Qty: 20 TABLET | Refills: 1 | Status: SHIPPED | OUTPATIENT
Start: 2020-06-18 | End: 2020-10-16

## 2020-09-25 RX ORDER — BUPROPION HYDROCHLORIDE 150 MG/1
TABLET, EXTENDED RELEASE ORAL
Qty: 90 TABLET | Refills: 0 | Status: SHIPPED | OUTPATIENT
Start: 2020-09-25 | End: 2020-12-28

## 2020-10-16 RX ORDER — LORATADINE AND PSEUDOEPHEDRINE SULFATE 5; 120 MG/1; MG/1
1 TABLET, EXTENDED RELEASE ORAL 2 TIMES DAILY
Qty: 20 TABLET | Refills: 0 | Status: SHIPPED | OUTPATIENT
Start: 2020-10-16 | End: 2020-11-18 | Stop reason: SDUPTHER

## 2020-11-12 DIAGNOSIS — E55.9 VITAMIN D DEFICIENCY: ICD-10-CM

## 2020-11-12 DIAGNOSIS — E78.2 MIXED HYPERLIPIDEMIA: Primary | ICD-10-CM

## 2020-11-12 DIAGNOSIS — R03.0 ELEVATED BLOOD PRESSURE READING WITHOUT DIAGNOSIS OF HYPERTENSION: ICD-10-CM

## 2020-11-13 LAB
25(OH)D3+25(OH)D2 SERPL-MCNC: 50.9 NG/ML (ref 30–100)
ALBUMIN SERPL-MCNC: 4.2 G/DL (ref 3.5–5.2)
ALBUMIN/GLOB SERPL: 1.8 G/DL
ALP SERPL-CCNC: 62 U/L (ref 39–117)
ALT SERPL-CCNC: 26 U/L (ref 1–41)
AST SERPL-CCNC: 20 U/L (ref 1–40)
BILIRUB SERPL-MCNC: 0.5 MG/DL (ref 0–1.2)
BUN SERPL-MCNC: 15 MG/DL (ref 8–23)
BUN/CREAT SERPL: 17.9 (ref 7–25)
CALCIUM SERPL-MCNC: 8.9 MG/DL (ref 8.6–10.5)
CHLORIDE SERPL-SCNC: 104 MMOL/L (ref 98–107)
CHOLEST SERPL-MCNC: 169 MG/DL (ref 0–200)
CO2 SERPL-SCNC: 26.3 MMOL/L (ref 22–29)
CREAT SERPL-MCNC: 0.84 MG/DL (ref 0.76–1.27)
GLOBULIN SER CALC-MCNC: 2.4 GM/DL
GLUCOSE SERPL-MCNC: 107 MG/DL (ref 65–99)
HDLC SERPL-MCNC: 61 MG/DL (ref 40–60)
LDLC SERPL CALC-MCNC: 93 MG/DL (ref 0–100)
LDLC/HDLC SERPL: 1.51 {RATIO}
POTASSIUM SERPL-SCNC: 4.4 MMOL/L (ref 3.5–5.2)
PROT SERPL-MCNC: 6.6 G/DL (ref 6–8.5)
SODIUM SERPL-SCNC: 138 MMOL/L (ref 136–145)
TRIGL SERPL-MCNC: 79 MG/DL (ref 0–150)
VLDLC SERPL CALC-MCNC: 15 MG/DL (ref 5–40)

## 2020-11-18 ENCOUNTER — TELEMEDICINE (OUTPATIENT)
Dept: FAMILY MEDICINE CLINIC | Facility: CLINIC | Age: 70
End: 2020-11-18

## 2020-11-18 DIAGNOSIS — E78.2 MIXED HYPERLIPIDEMIA: Primary | ICD-10-CM

## 2020-11-18 DIAGNOSIS — F32.0 MILD MAJOR DEPRESSION (HCC): ICD-10-CM

## 2020-11-18 DIAGNOSIS — J30.1 SEASONAL ALLERGIC RHINITIS DUE TO POLLEN: ICD-10-CM

## 2020-11-18 DIAGNOSIS — R73.03 PREDIABETES: ICD-10-CM

## 2020-11-18 DIAGNOSIS — F43.21 GRIEF: ICD-10-CM

## 2020-11-18 PROCEDURE — 99214 OFFICE O/P EST MOD 30 MIN: CPT | Performed by: NURSE PRACTITIONER

## 2020-11-18 RX ORDER — LORATADINE AND PSEUDOEPHEDRINE SULFATE 5; 120 MG/1; MG/1
1 TABLET, EXTENDED RELEASE ORAL 2 TIMES DAILY
Qty: 30 TABLET | Refills: 3 | Status: SHIPPED | OUTPATIENT
Start: 2020-11-18 | End: 2021-01-29

## 2020-11-24 RX ORDER — ATORVASTATIN CALCIUM 20 MG/1
TABLET, FILM COATED ORAL
Qty: 90 TABLET | Refills: 1 | Status: SHIPPED | OUTPATIENT
Start: 2020-11-24 | End: 2021-06-10

## 2020-11-29 NOTE — PROGRESS NOTES
Subjective   Juan C Kumar is a 70 y.o. male.     Very pleasant patient follow-up via video visit, he has been doing well overall  Hyperlipidemia mixed he is compliant medications statin  Prediabetes impaired fasting glucose he is trying to eat better and stay healthy  He has been doing well considering the pandemic he has been social distancing he spending time with a friend  Grief mild major depression  His wife passed last year he still grieving but overall he is happy with his progress.    History of allergies well seasonal takes Sudafed at times has no difficulty with it  Quite uncomfortable without sinuses chronic         There were no vitals taken for this visit.      The following portions of the patient's history were reviewed and updated as appropriate: allergies, current medications, past family history, past medical history, past social history, past surgical history and problem list.    Review of Systems   Constitutional: Negative for fatigue and fever.   HENT: Negative.  Negative for trouble swallowing.    Eyes: Negative.    Respiratory: Negative.  Negative for cough and shortness of breath.    Cardiovascular: Negative for chest pain, palpitations and leg swelling.   Gastrointestinal: Negative.  Negative for abdominal pain.   Genitourinary: Negative.    Musculoskeletal: Negative.    Skin: Negative.    Neurological: Negative.  Negative for dizziness and confusion.   Psychiatric/Behavioral: Negative.        Objective   Physical Exam  Constitutional:       Appearance: Normal appearance.   Pulmonary:      Effort: Pulmonary effort is normal.   Skin:     General: Skin is dry.   Neurological:      Mental Status: He is alert and oriented to person, place, and time.   Psychiatric:         Mood and Affect: Mood normal.         Behavior: Behavior normal.           Assessment/Plan   Diagnoses and all orders for this visit:    1. Mixed hyperlipidemia (Primary)    2. Prediabetes    3. Grief    4. Mild major  depression (CMS/HCC)    Other orders  -     loratadine-pseudoephedrine (Claritin-D 12 Hour) 5-120 MG per 12 hr tablet; Take 1 tablet by mouth 2 (Two) Times a Day. Do not take if blood pressure systolic is greater than 140  Dispense: 30 tablet; Refill: 3      Patient continue present plan    Continue present plan continue Lipitor caution with decongestions generally avoid these and I do not recommend these chronically and try other alternatives these may increase risk of stroke and heart attack agreed to fill these as he is already taking but encouraged to use very little and check blood pressure  Continue present plan follow-up in 6 months continue social distance continue bupropion  Video visit with patient permission 20-minute    There are no Patient Instructions on file for this visit.

## 2020-12-14 RX ORDER — NABUMETONE 500 MG/1
TABLET, FILM COATED ORAL
Qty: 120 TABLET | Refills: 1 | Status: SHIPPED | OUTPATIENT
Start: 2020-12-14 | End: 2022-06-15

## 2020-12-28 RX ORDER — BUPROPION HYDROCHLORIDE 150 MG/1
TABLET, EXTENDED RELEASE ORAL
Qty: 90 TABLET | Refills: 0 | Status: SHIPPED | OUTPATIENT
Start: 2020-12-28 | End: 2021-03-30

## 2021-01-26 ENCOUNTER — TELEPHONE (OUTPATIENT)
Dept: FAMILY MEDICINE CLINIC | Facility: CLINIC | Age: 71
End: 2021-01-26

## 2021-01-26 NOTE — TELEPHONE ENCOUNTER
----- Message from Dennies Garrick, MA sent at 1/26/2021  2:00 PM EST -----  Regarding: FW: Test Results Question    ----- Message -----  From: Juan C Kumar  Sent: 1/26/2021   8:37 AM EST  To: [facility]  Subject: Test Results Question                            Arnol, my BP is still running high.  146/80    1/20/21  146/86.   1/24/21  144/82.    1/26/22    Taken by retired nurse using sphygmomanometer.     Juan C Kumar

## 2021-01-26 NOTE — TELEPHONE ENCOUNTER
Schedule appointment soon to discuss his blood pressure as well as to treat hypertension thank you

## 2021-01-29 ENCOUNTER — OFFICE VISIT (OUTPATIENT)
Dept: FAMILY MEDICINE CLINIC | Facility: CLINIC | Age: 71
End: 2021-01-29

## 2021-01-29 VITALS
BODY MASS INDEX: 32.05 KG/M2 | TEMPERATURE: 97.5 F | OXYGEN SATURATION: 96 % | DIASTOLIC BLOOD PRESSURE: 92 MMHG | HEIGHT: 69 IN | SYSTOLIC BLOOD PRESSURE: 158 MMHG | HEART RATE: 100 BPM | WEIGHT: 216.4 LBS

## 2021-01-29 DIAGNOSIS — E78.2 MIXED HYPERLIPIDEMIA: ICD-10-CM

## 2021-01-29 DIAGNOSIS — I10 ESSENTIAL HYPERTENSION: Primary | ICD-10-CM

## 2021-01-29 DIAGNOSIS — R73.03 PREDIABETES: ICD-10-CM

## 2021-01-29 DIAGNOSIS — F32.0 MILD MAJOR DEPRESSION (HCC): ICD-10-CM

## 2021-01-29 DIAGNOSIS — F43.21 GRIEF: ICD-10-CM

## 2021-01-29 PROCEDURE — 99214 OFFICE O/P EST MOD 30 MIN: CPT | Performed by: NURSE PRACTITIONER

## 2021-01-29 RX ORDER — LOSARTAN POTASSIUM 25 MG/1
25 TABLET ORAL DAILY
Qty: 30 TABLET | Refills: 1 | Status: SHIPPED | OUTPATIENT
Start: 2021-01-29 | End: 2021-03-29

## 2021-01-29 RX ORDER — ZINC GLUCONATE 50 MG
TABLET ORAL
COMMUNITY
End: 2022-07-01

## 2021-02-01 DIAGNOSIS — Z12.5 PROSTATE CANCER SCREENING: Primary | ICD-10-CM

## 2021-03-01 RX ORDER — FLUTICASONE PROPIONATE 50 MCG
SPRAY, SUSPENSION (ML) NASAL
Qty: 9.9 ML | Refills: 3 | Status: SHIPPED | OUTPATIENT
Start: 2021-03-01 | End: 2021-10-18

## 2021-03-02 DIAGNOSIS — Z23 IMMUNIZATION DUE: ICD-10-CM

## 2021-03-29 RX ORDER — LOSARTAN POTASSIUM 25 MG/1
TABLET ORAL
Qty: 90 TABLET | Refills: 1 | Status: SHIPPED | OUTPATIENT
Start: 2021-03-29 | End: 2021-10-05

## 2021-03-30 RX ORDER — BUPROPION HYDROCHLORIDE 150 MG/1
TABLET, EXTENDED RELEASE ORAL
Qty: 90 TABLET | Refills: 0 | Status: SHIPPED | OUTPATIENT
Start: 2021-03-30 | End: 2021-06-28

## 2021-06-10 RX ORDER — ATORVASTATIN CALCIUM 20 MG/1
TABLET, FILM COATED ORAL
Qty: 90 TABLET | Refills: 2 | Status: SHIPPED | OUTPATIENT
Start: 2021-06-10 | End: 2022-03-16

## 2021-06-16 RX ORDER — SILDENAFIL 100 MG/1
TABLET, FILM COATED ORAL
Qty: 9 TABLET | Refills: 5 | Status: SHIPPED | OUTPATIENT
Start: 2021-06-16 | End: 2022-07-11

## 2021-06-28 RX ORDER — BUPROPION HYDROCHLORIDE 150 MG/1
TABLET, EXTENDED RELEASE ORAL
Qty: 90 TABLET | Refills: 0 | Status: SHIPPED | OUTPATIENT
Start: 2021-06-28 | End: 2021-10-07

## 2021-07-21 DIAGNOSIS — R73.03 PREDIABETES: ICD-10-CM

## 2021-07-21 DIAGNOSIS — E78.2 MIXED HYPERLIPIDEMIA: Primary | ICD-10-CM

## 2021-07-21 DIAGNOSIS — E78.2 MIXED HYPERLIPIDEMIA: ICD-10-CM

## 2021-07-21 DIAGNOSIS — I10 ESSENTIAL HYPERTENSION: ICD-10-CM

## 2021-07-23 LAB
ALBUMIN SERPL-MCNC: 4.4 G/DL (ref 3.5–5.2)
ALBUMIN/GLOB SERPL: 1.9 G/DL
ALP SERPL-CCNC: 66 U/L (ref 39–117)
ALT SERPL-CCNC: 27 U/L (ref 1–41)
AST SERPL-CCNC: 22 U/L (ref 1–40)
BILIRUB SERPL-MCNC: 0.5 MG/DL (ref 0–1.2)
BUN SERPL-MCNC: 11 MG/DL (ref 8–23)
BUN/CREAT SERPL: 12 (ref 7–25)
CALCIUM SERPL-MCNC: 9.4 MG/DL (ref 8.6–10.5)
CHLORIDE SERPL-SCNC: 105 MMOL/L (ref 98–107)
CHOLEST SERPL-MCNC: 162 MG/DL (ref 0–200)
CO2 SERPL-SCNC: 25 MMOL/L (ref 22–29)
CREAT SERPL-MCNC: 0.92 MG/DL (ref 0.76–1.27)
GLOBULIN SER CALC-MCNC: 2.3 GM/DL
GLUCOSE SERPL-MCNC: 121 MG/DL (ref 65–99)
HBA1C MFR BLD: 6.2 % (ref 4.8–5.6)
HDLC SERPL-MCNC: 44 MG/DL (ref 40–60)
LDLC SERPL CALC-MCNC: 104 MG/DL (ref 0–100)
LDLC/HDLC SERPL: 2.35 {RATIO}
POTASSIUM SERPL-SCNC: 4.5 MMOL/L (ref 3.5–5.2)
PROT SERPL-MCNC: 6.7 G/DL (ref 6–8.5)
SODIUM SERPL-SCNC: 140 MMOL/L (ref 136–145)
TRIGL SERPL-MCNC: 73 MG/DL (ref 0–150)
VLDLC SERPL CALC-MCNC: 14 MG/DL (ref 5–40)

## 2021-07-28 ENCOUNTER — OFFICE VISIT (OUTPATIENT)
Dept: FAMILY MEDICINE CLINIC | Facility: CLINIC | Age: 71
End: 2021-07-28

## 2021-07-28 VITALS
HEART RATE: 91 BPM | WEIGHT: 211.6 LBS | BODY MASS INDEX: 31.34 KG/M2 | DIASTOLIC BLOOD PRESSURE: 80 MMHG | TEMPERATURE: 97.5 F | SYSTOLIC BLOOD PRESSURE: 137 MMHG | OXYGEN SATURATION: 94 % | HEIGHT: 69 IN

## 2021-07-28 DIAGNOSIS — I10 ESSENTIAL HYPERTENSION: ICD-10-CM

## 2021-07-28 DIAGNOSIS — R09.82 POSTNASAL DRIP: ICD-10-CM

## 2021-07-28 DIAGNOSIS — R73.03 PREDIABETES: ICD-10-CM

## 2021-07-28 DIAGNOSIS — Z00.00 MEDICARE ANNUAL WELLNESS VISIT, SUBSEQUENT: Primary | ICD-10-CM

## 2021-07-28 DIAGNOSIS — K42.9 UMBILICAL HERNIA WITHOUT OBSTRUCTION AND WITHOUT GANGRENE: ICD-10-CM

## 2021-07-28 PROCEDURE — 99213 OFFICE O/P EST LOW 20 MIN: CPT | Performed by: NURSE PRACTITIONER

## 2021-07-28 PROCEDURE — G0439 PPPS, SUBSEQ VISIT: HCPCS | Performed by: NURSE PRACTITIONER

## 2021-07-28 RX ORDER — IPRATROPIUM BROMIDE 42 UG/1
2 SPRAY, METERED NASAL 4 TIMES DAILY
Qty: 15 ML | Refills: 5 | Status: SHIPPED | OUTPATIENT
Start: 2021-07-28

## 2021-08-03 NOTE — PROGRESS NOTES
The ABCs of the Annual Wellness Visit  Subsequent Medicare Wellness Visit    Chief Complaint   Patient presents with   • Hypertension   • Medicare Wellness-subsequent       Subjective   History of Present Illness:  Juan C Kumar is a 71 y.o. male who presents for a Subsequent Medicare Wellness Visit.    HEALTH RISK ASSESSMENT    Recent Hospitalizations:  No hospitalization(s) within the last year.    Current Medical Providers:  Patient Care Team:  Epley, James, APRN as PCP - General (Family Medicine)  Tay Orozco MD as Surgeon (Hand Surgery)    Smoking Status:  Social History     Tobacco Use   Smoking Status Former Smoker   Smokeless Tobacco Never Used       Alcohol Consumption:  Social History     Substance and Sexual Activity   Alcohol Use Yes    Comment: social       Depression Screen:   PHQ-2/PHQ-9 Depression Screening 12/18/2018   Little interest or pleasure in doing things 0   Feeling down, depressed, or hopeless 0   Trouble falling or staying asleep, or sleeping too much -   Feeling tired or having little energy -   Poor appetite or overeating -   Feeling bad about yourself - or that you are a failure or have let yourself or your family down -   Trouble concentrating on things, such as reading the newspaper or watching television -   Moving or speaking so slowly that other people could have noticed. Or the opposite - being so fidgety or restless that you have been moving around a lot more than usual -   Thoughts that you would be better off dead, or of hurting yourself in some way -   Total Score 0   If you checked off any problems, how difficult have these problems made it for you to do your work, take care of things at home, or get along with other people? -       Fall Risk Screen:  STEADI Fall Risk Assessment has not been completed.    Health Habits and Functional and Cognitive Screening:  Functional & Cognitive Status 7/28/2021   Do you have difficulty preparing food and eating? No   Do you have  difficulty bathing yourself, getting dressed or grooming yourself? No   Do you have difficulty using the toilet? No   Do you have difficulty moving around from place to place? No   Do you have trouble with steps or getting out of a bed or a chair? No   Current Diet Other        Current Diet Comment Balance   Dental Exam Up to date   Eye Exam Not up to date   Exercise (times per week) 4 times per week   Current Exercises Include Walking        Exercise Comment Cut grass, wash car   Current Exercise Activities Include -   Do you need help using the phone?  No   Are you deaf or do you have serious difficulty hearing?  No   Do you need help with transportation? No   Do you need help shopping? No   Do you need help preparing meals?  No   Do you need help with housework?  No   Do you need help with laundry? No   Do you need help taking your medications? No   Do you need help managing money? No   Do you ever drive or ride in a car without wearing a seat belt? No   Have you felt unusual stress, anger or loneliness in the last month? No   Who do you live with? Spouse   If you need help, do you have trouble finding someone available to you? No   Have you been bothered in the last four weeks by sexual problems? -   Do you have difficulty concentrating, remembering or making decisions? No         Does the patient have evidence of cognitive impairment? No    Asprin use counseling:Taking ASA appropriately as indicated    Age-appropriate Screening Schedule:  Refer to the list below for future screening recommendations based on patient's age, sex and/or medical conditions. Orders for these recommended tests are listed in the plan section. The patient has been provided with a written plan.    Health Maintenance   Topic Date Due   • ZOSTER VACCINE (3 of 3) 11/28/2013   • INFLUENZA VACCINE  10/01/2021   • PROSTATE CANCER SCREENING  02/05/2022   • LIPID PANEL  07/22/2022   • TDAP/TD VACCINES (3 - Td or Tdap) 06/05/2028          The  following portions of the patient's history were reviewed and updated as appropriate: allergies, current medications, past family history, past medical history, past social history, past surgical history and problem list.    Outpatient Medications Prior to Visit   Medication Sig Dispense Refill   • acetaminophen (TYLENOL) 500 MG tablet Take 500 mg by mouth Every 6 (Six) Hours As Needed for Mild Pain (1-3).     • aspirin 81 MG EC tablet Take 81 mg by mouth daily.     • atorvastatin (LIPITOR) 20 MG tablet TAKE ONE TABLET BY MOUTH EVERY EVENING FOR LIPIDS AND RISK REDUCTION 90 tablet 2   • azelastine (ASTELIN) 0.1 % nasal spray 2 sprays into the nostril(s) as directed by provider 2 (Two) Times a Day. As needed for nasal allergies 1 each 12   • B Complex-C (SUPER B COMPLEX/VITAMIN C) tablet Take  by mouth.     • buPROPion SR (WELLBUTRIN SR) 150 MG 12 hr tablet TAKE ONE TABLET BY MOUTH DAILY FOR DEPRESSION 90 tablet 0   • Cholecalciferol (VITAMIN D3) 2000 units capsule Two OTC caps  QDay to supplement Vitamin D 60 capsule prn   • coenzyme Q10 100 MG capsule Take 100 mg by mouth daily.     • esomeprazole (nexIUM) 20 MG capsule Take 20 mg by mouth Every Morning Before Breakfast.     • fluticasone (FLONASE) 50 MCG/ACT nasal spray SPRAY TWO SPRAYS IN EACH NOSTRIL ONCE DAILY 9.9 mL 3   • Glucosamine-Chondroit-Vit C-Mn (GLUCOSAMINE CHONDR 1500 COMPLX PO) Take  by mouth.     • losartan (COZAAR) 25 MG tablet TAKE ONE TABLET BY MOUTH DAILY FOR BLOOD PRESSURE 90 tablet 1   • nabumetone (RELAFEN) 500 MG tablet TAKE ONE TO TWO TABLETS BY MOUTH TWICE A DAY AS NEEDED FOR PAIN . TAKE WITH FOOD AND WATER. 120 tablet 1   • Omega-3 Fatty Acids (FISH OIL) 1200 MG capsule capsule Take 1,200 mg by mouth Daily.     • sildenafil (VIAGRA) 100 MG tablet TAKE ONE-HALF TO ONE TABLET BY MOUTH BY MOUTH APPROXIMATELY 1 HOUR PRIOR TO INTAMACY AS NEEDED DAILY 9 tablet 5   • TURMERIC PO Take  by mouth.     • saw palmetto 80 MG capsule Take 80 mg by  "mouth 2 (two) times a day.     • Zinc 50 MG tablet        No facility-administered medications prior to visit.       Patient Active Problem List   Diagnosis   • Hyperlipidemia   • Gastroesophageal reflux disease   • Osteoarthritis of both hands   • Health care maintenance   • Prostate cancer screening   • Left-sided low back pain with left-sided sciatica   • IFG (impaired fasting glucose)   • Acute recurrent maxillary sinusitis   • Colon cancer screening   • ]   • Prediabetes   • Depression   • Grief   • Mild major depression (CMS/[facility])   • Elevated blood pressure reading without diagnosis of hypertension   • Erectile dysfunction       Advanced Care Planning:  ACP discussion was declined by the patient. Patient has an advance directive in EMR which is still valid.     Review of Systems    Compared to one year ago, the patient feels his physical health is the same.  Compared to one year ago, the patient feels his mental health is the same.    Reviewed chart for potential of high risk medication in the elderly: yes  Reviewed chart for potential of harmful drug interactions in the elderly:yes    Objective         Vitals:    07/28/21 0835   BP: 137/80   Pulse: 91   Temp: 97.5 °F (36.4 °C)   TempSrc: Temporal   SpO2: 94%   Weight: 96 kg (211 lb 9.6 oz)   Height: 175.3 cm (69\")       Body mass index is 31.25 kg/m².  Discussed the patient's BMI with him. The BMI is above average; BMI management plan is completed.    Physical Exam    Lab Results   Component Value Date     (H) 07/22/2021    CHLPL 162 07/22/2021    TRIG 73 07/22/2021    HDL 44 07/22/2021     (H) 07/22/2021    VLDL 14 07/22/2021    HGBA1C 6.20 (H) 07/22/2021        Assessment/Plan   Medicare Risks and Personalized Health Plan  CMS Preventative Services Quick Reference  Cardiovascular risk  Fall Risk  Immunizations Discussed/Encouraged (specific immunizations; Immunization status Covid up-to-date )    The above risks/problems have been discussed " with the patient.  Pertinent information has been shared with the patient in the After Visit Summary.  Follow up plans and orders are seen below in the Assessment/Plan Section.    Diagnoses and all orders for this visit:    1. Essential hypertension (Primary)    2. Postnasal drip    3. Umbilical hernia without obstruction and without gangrene    4. Prediabetes    Other orders  -     ipratropium (ATROVENT) 0.06 % nasal spray; 2 sprays into the nostril(s) as directed by provider 4 (Four) Times a Day. 2 sprays each nostril every night at bedtime  Dispense: 15 mL; Refill: 5      Follow Up:  Return in about 6 months (around 1/28/2022).     An After Visit Summary and PPPS were given to the patient.       Colonoscopy up-to-date  Covid immunization up-to-date  Patient is without any signs or symptoms of depression overall is doing well tries to stay active take care of himself his wife passed in 2019    Falls precaution          Answers for HPI/ROS submitted by the patient on 7/21/2021  What is the primary reason for your visit?: Physical

## 2021-08-03 NOTE — PROGRESS NOTES
"Subjective   Juan C Kumar is a 71 y.o. male.     Pleasant gentleman here today for Medicare wellness exam as well as follow-up hyperlipidemia  Patient takes Lipitor appropriately essential hypertension controlled update his medication  Postnasal drip,  Without sinus pain chronic worse in the mornings  Prediabetes, some mild obesity  And improve his diet          Hypertension  Pertinent negatives include no chest pain, palpitations or shortness of breath.        /80   Pulse 91   Temp 97.5 °F (36.4 °C) (Temporal)   Ht 175.3 cm (69\")   Wt 96 kg (211 lb 9.6 oz)   SpO2 94%   BMI 31.25 kg/m²       The following portions of the patient's history were reviewed and updated as appropriate: allergies, current medications, past family history, past medical history, past social history, past surgical history and problem list.    Review of Systems   Constitutional: Negative for fatigue and fever.   HENT: Negative.  Negative for trouble swallowing.    Eyes: Negative.    Respiratory: Negative.  Negative for cough and shortness of breath.    Cardiovascular: Negative for chest pain, palpitations and leg swelling.   Gastrointestinal: Negative.  Negative for abdominal pain.   Genitourinary: Negative.    Musculoskeletal: Negative.    Skin: Negative.    Neurological: Negative.  Negative for dizziness and confusion.   Psychiatric/Behavioral: Negative.        Objective   Physical Exam  Vitals reviewed.   Constitutional:       General: He is not in acute distress.     Appearance: He is well-developed. He is not diaphoretic.      Comments: Quite pleasant appears well   HENT:      Head: Normocephalic and atraumatic.      Nose: Nose normal.   Eyes:      Conjunctiva/sclera: Conjunctivae normal.      Pupils: Pupils are equal, round, and reactive to light.   Neck:      Vascular: No JVD.      Comments: Carotids clear  Cardiovascular:      Rate and Rhythm: Normal rate and regular rhythm.      Heart sounds: Normal heart sounds. No murmur " heard.     Pulmonary:      Effort: Pulmonary effort is normal. No respiratory distress.      Breath sounds: Normal breath sounds. No wheezing.   Abdominal:      General: Bowel sounds are normal. There is no distension.      Palpations: Abdomen is soft. There is no mass.      Tenderness: There is no abdominal tenderness. There is no guarding.      Hernia: No hernia is present.   Musculoskeletal:         General: No tenderness.      Cervical back: Neck supple.   Lymphadenopathy:      Cervical: No cervical adenopathy.   Skin:     General: Skin is warm and dry.   Neurological:      Mental Status: He is alert and oriented to person, place, and time.   Psychiatric:         Behavior: Behavior normal.         Thought Content: Thought content normal.         Judgment: Judgment normal.           Assessment/Plan   Diagnoses and all orders for this visit:    1. Medicare annual wellness visit, subsequent (Primary)    2. Postnasal drip    3. Essential hypertension    4. Umbilical hernia without obstruction and without gangrene    5. Prediabetes    Other orders  -     ipratropium (ATROVENT) 0.06 % nasal spray; 2 sprays into the nostril(s) as directed by provider 4 (Four) Times a Day. 2 sprays each nostril every night at bedtime  Dispense: 15 mL; Refill: 5                  Patient Instructions   Discharge instructions    Check blood pressure weekly  Therapeutic lifestyle changes follow American diabetes Association  \Diet or diabetes.org for lifestyle changes really focus on decreasing the processed foods and processed sweets simple carbohydrates these must be decreased substantially  Decrease portion size  More vegetables more fiber  Chicken fish 64 ounces water daily        Follow-up in 6 months labs prior to next appointment may consider adding a low-dose Metformin for prediabetes                 Answers for HPI/ROS submitted by the patient on 7/21/2021  What is the primary reason for your visit?: Physical

## 2021-09-08 RX ORDER — LORATADINE AND PSEUDOEPHEDRINE SULFATE 5; 120 MG/1; MG/1
TABLET, EXTENDED RELEASE ORAL
Qty: 30 TABLET | Refills: 3 | Status: SHIPPED | OUTPATIENT
Start: 2021-09-08 | End: 2022-02-28

## 2021-09-08 NOTE — TELEPHONE ENCOUNTER
Rx Refill Note  Requested Prescriptions     Pending Prescriptions Disp Refills   • Claritin-D 12 Hour 5-120 MG per 12 hr tablet [Pharmacy Med Name: CLARITIN-D 12 HOUR TABLET] 30 tablet 3     Sig: TAKE ONE TABLET BY MOUTH TWICE A DAY DO NOT TAKE IF BLOOD PRESSURE SYSTOLIC IS GREATER THAN 140      Last office visit with prescribing clinician: 7/28/2021      Next office visit with prescribing clinician: 9/14/2021            Jaylyn Kellogg Rep  09/08/21, 12:29 EDT

## 2021-09-14 ENCOUNTER — OFFICE VISIT (OUTPATIENT)
Dept: FAMILY MEDICINE CLINIC | Facility: CLINIC | Age: 71
End: 2021-09-14

## 2021-09-14 VITALS
HEART RATE: 104 BPM | HEIGHT: 69 IN | RESPIRATION RATE: 16 BRPM | TEMPERATURE: 98.7 F | DIASTOLIC BLOOD PRESSURE: 88 MMHG | SYSTOLIC BLOOD PRESSURE: 130 MMHG | OXYGEN SATURATION: 96 % | BODY MASS INDEX: 42.95 KG/M2 | WEIGHT: 290 LBS

## 2021-09-14 DIAGNOSIS — R59.0 LYMPHADENOPATHY, CERVICAL: Primary | ICD-10-CM

## 2021-09-14 PROCEDURE — 99212 OFFICE O/P EST SF 10 MIN: CPT | Performed by: NURSE PRACTITIONER

## 2021-09-14 PROCEDURE — 90471 IMMUNIZATION ADMIN: CPT | Performed by: NURSE PRACTITIONER

## 2021-09-14 PROCEDURE — 90686 IIV4 VACC NO PRSV 0.5 ML IM: CPT | Performed by: NURSE PRACTITIONER

## 2021-09-14 NOTE — PROGRESS NOTES
"Chief Complaint  Angioedema (PT STATES RIGHT EAR LYMPH NODE GLAND SWOLLEN UNDER RIGHT EAR)    Subjective          Juan C Kumar presents to [facility]  Pleasant gentleman complained of enlarged lymph nodes started last week, he had 3 - Covid test he thought maybe is a virus,  He has had no fever no sore throat no chest pain no shortness of breath he had a little bit of stuffy nose, schedule appointment here to make sure resolution he has no history of malignancy no night sweats unexplained weight loss or history of lymphoma does not change or did not change with sweets or sours or any eating he has no facial swelling no parotid swelling.    No known Covid exposure is fully vaccinated    Area of concern was right lateral aspect of neck approximately 2 cm inferior to his proximal mandible        Objective   Vital Signs:   /88   Pulse 104   Temp 98.7 °F (37.1 °C) (Infrared)   Resp 16   Ht 175.3 cm (69\")   Wt 132 kg (290 lb)   SpO2 96%   BMI 42.83 kg/m²     Physical Exam  Constitutional:       Appearance: Normal appearance. He is not ill-appearing or diaphoretic.   HENT:      Mouth/Throat:      Mouth: Mucous membranes are dry.      Comments: Pharynx clear  Eyes:      Pupils: Pupils are equal, round, and reactive to light.   Neck:      Comments: Carefully examined anterior posterior lateral aspect of his of his cervical chain nodes, no enlarged nodes noted no tenderness no mass exam and thyroid parotid TMJ all normal neck is supple  Reexamined patient as well as have patient point specifically to the area of concern and he agrees its gone  Or nearly completely gone  No worrisome or enlarged node or mass on exam.  Musculoskeletal:      Cervical back: Normal range of motion and neck supple. No tenderness.   Lymphadenopathy:      Cervical: No cervical adenopathy.   Skin:     General: Skin is warm and dry.   Neurological:      General: No focal deficit present.      Mental Status: " He is alert and oriented to person, place, and time.   Psychiatric:         Mood and Affect: Mood normal.         Behavior: Behavior normal.        Result Review :                 Assessment and Plan    There are no diagnoses linked to this encounter.    Follow Up   No follow-ups on file.  Patient was given instructions and counseling regarding his condition or for health maintenance advice. Please see specific information pulled into the AVS if appropriate.     Patient has no lymphadenopathy on exam his lymph node has resolved completely or nearly completely he is without fever or other worrisome complaints should he have recurrence of this ER reach to me immediately for further direction  And referral evaluation    Continue present care follow-up with his next appointment should he develop Covid he should reach out to me immediately for  Antibody infusion he is fully vaccinated

## 2021-10-05 RX ORDER — LOSARTAN POTASSIUM 25 MG/1
TABLET ORAL
Qty: 90 TABLET | Refills: 1 | Status: SHIPPED | OUTPATIENT
Start: 2021-10-05 | End: 2022-04-04

## 2021-10-05 NOTE — TELEPHONE ENCOUNTER
Rx Refill Note  Requested Prescriptions     Pending Prescriptions Disp Refills   • losartan (COZAAR) 25 MG tablet [Pharmacy Med Name: LOSARTAN POTASSIUM 25 MG TAB] 30 tablet      Sig: TAKE ONE TABLET BY MOUTH DAILY FOR BLOOD PRESSURE      Last office visit with prescribing clinician: 9/14/2021      Next office visit with prescribing clinician: 2/4/2022            Jaylyn Kellogg Rep  10/05/21, 15:19 EDT

## 2021-10-07 RX ORDER — BUPROPION HYDROCHLORIDE 150 MG/1
TABLET, EXTENDED RELEASE ORAL
Qty: 90 TABLET | Refills: 1 | Status: SHIPPED | OUTPATIENT
Start: 2021-10-07 | End: 2022-06-17 | Stop reason: SDUPTHER

## 2021-10-07 NOTE — TELEPHONE ENCOUNTER
Rx Refill Note  Requested Prescriptions     Pending Prescriptions Disp Refills   • buPROPion SR (WELLBUTRIN SR) 150 MG 12 hr tablet [Pharmacy Med Name: buPROPion HCL  MG TABLET] 90 tablet 0     Sig: TAKE ONE TABLET BY MOUTH DAILY FOR DEPRESSION      Last office visit with prescribing clinician: Visit date not found      Next office visit with prescribing clinician: Visit date not found            Ashtyn Elizabeth MA  10/07/21, 13:24 EDT

## 2021-10-18 RX ORDER — FLUTICASONE PROPIONATE 50 MCG
SPRAY, SUSPENSION (ML) NASAL
Qty: 16 ML | Refills: 5 | Status: SHIPPED | OUTPATIENT
Start: 2021-10-18 | End: 2022-11-07

## 2021-10-18 NOTE — TELEPHONE ENCOUNTER
Rx Refill Note  Requested Prescriptions     Pending Prescriptions Disp Refills   • fluticasone (FLONASE) 50 MCG/ACT nasal spray [Pharmacy Med Name: FLUTICASONE PROP 50 MCG SPRAY] 16 mL      Sig: SPRAY TWO SPRAYS IN EACH NOSTRIL ONCE DAILY      Last office visit with prescribing clinician: 9/14/2021      Next office visit with prescribing clinician: 2/4/2022            Jaylyn Kellogg Rep  10/18/21, 12:30 EDT

## 2022-01-20 DIAGNOSIS — R73.03 PREDIABETES: ICD-10-CM

## 2022-01-20 DIAGNOSIS — I10 ESSENTIAL HYPERTENSION: ICD-10-CM

## 2022-01-20 DIAGNOSIS — Z12.5 PROSTATE CANCER SCREENING: ICD-10-CM

## 2022-01-20 DIAGNOSIS — E55.9 VITAMIN D DEFICIENCY: ICD-10-CM

## 2022-01-20 DIAGNOSIS — E78.2 MIXED HYPERLIPIDEMIA: Primary | ICD-10-CM

## 2022-01-20 DIAGNOSIS — Z00.00 HEALTH CARE MAINTENANCE: ICD-10-CM

## 2022-01-20 DIAGNOSIS — R03.0 ELEVATED BLOOD PRESSURE READING WITHOUT DIAGNOSIS OF HYPERTENSION: ICD-10-CM

## 2022-01-29 LAB
25(OH)D3+25(OH)D2 SERPL-MCNC: 53.3 NG/ML (ref 30–100)
ALBUMIN SERPL-MCNC: 4.2 G/DL (ref 3.7–4.7)
ALBUMIN/GLOB SERPL: 1.6 {RATIO} (ref 1.2–2.2)
ALP SERPL-CCNC: 68 IU/L (ref 44–121)
ALT SERPL-CCNC: 29 IU/L (ref 0–44)
AST SERPL-CCNC: 24 IU/L (ref 0–40)
BASOPHILS # BLD AUTO: 0.1 X10E3/UL (ref 0–0.2)
BASOPHILS NFR BLD AUTO: 1 %
BILIRUB SERPL-MCNC: 0.5 MG/DL (ref 0–1.2)
BUN SERPL-MCNC: 12 MG/DL (ref 8–27)
BUN/CREAT SERPL: 13 (ref 10–24)
CALCIUM SERPL-MCNC: 9.1 MG/DL (ref 8.6–10.2)
CHLORIDE SERPL-SCNC: 105 MMOL/L (ref 96–106)
CHOLEST SERPL-MCNC: 158 MG/DL (ref 100–199)
CO2 SERPL-SCNC: 21 MMOL/L (ref 20–29)
CREAT SERPL-MCNC: 0.9 MG/DL (ref 0.76–1.27)
EOSINOPHIL # BLD AUTO: 0.3 X10E3/UL (ref 0–0.4)
EOSINOPHIL NFR BLD AUTO: 4 %
ERYTHROCYTE [DISTWIDTH] IN BLOOD BY AUTOMATED COUNT: 12.4 % (ref 11.6–15.4)
GLOBULIN SER CALC-MCNC: 2.6 G/DL (ref 1.5–4.5)
GLUCOSE SERPL-MCNC: 128 MG/DL (ref 65–99)
HBA1C MFR BLD: 6.5 % (ref 4.8–5.6)
HCT VFR BLD AUTO: 45.5 % (ref 37.5–51)
HDLC SERPL-MCNC: 45 MG/DL
HGB BLD-MCNC: 15.4 G/DL (ref 13–17.7)
IMM GRANULOCYTES # BLD AUTO: 0 X10E3/UL (ref 0–0.1)
IMM GRANULOCYTES NFR BLD AUTO: 0 %
LDLC SERPL CALC-MCNC: 93 MG/DL (ref 0–99)
LYMPHOCYTES # BLD AUTO: 2.1 X10E3/UL (ref 0.7–3.1)
LYMPHOCYTES NFR BLD AUTO: 33 %
MCH RBC QN AUTO: 30.9 PG (ref 26.6–33)
MCHC RBC AUTO-ENTMCNC: 33.8 G/DL (ref 31.5–35.7)
MCV RBC AUTO: 91 FL (ref 79–97)
MONOCYTES # BLD AUTO: 0.7 X10E3/UL (ref 0.1–0.9)
MONOCYTES NFR BLD AUTO: 11 %
NEUTROPHILS # BLD AUTO: 3.2 X10E3/UL (ref 1.4–7)
NEUTROPHILS NFR BLD AUTO: 51 %
PLATELET # BLD AUTO: 282 X10E3/UL (ref 150–450)
POTASSIUM SERPL-SCNC: 4.4 MMOL/L (ref 3.5–5.2)
PROT SERPL-MCNC: 6.8 G/DL (ref 6–8.5)
RBC # BLD AUTO: 4.99 X10E6/UL (ref 4.14–5.8)
SODIUM SERPL-SCNC: 140 MMOL/L (ref 134–144)
TRIGL SERPL-MCNC: 108 MG/DL (ref 0–149)
TSH SERPL-ACNC: 1.69 UIU/ML (ref 0.45–4.5)
VLDLC SERPL CALC-MCNC: 20 MG/DL (ref 5–40)
WBC # BLD AUTO: 6.3 X10E3/UL (ref 3.4–10.8)

## 2022-02-28 RX ORDER — LORATADINE AND PSEUDOEPHEDRINE SULFATE 5; 120 MG/1; MG/1
TABLET, EXTENDED RELEASE ORAL
Qty: 30 TABLET | Refills: 3 | Status: SHIPPED | OUTPATIENT
Start: 2022-02-28 | End: 2022-08-08

## 2022-03-16 RX ORDER — ATORVASTATIN CALCIUM 20 MG/1
TABLET, FILM COATED ORAL
Qty: 90 TABLET | Refills: 2 | Status: SHIPPED | OUTPATIENT
Start: 2022-03-16 | End: 2022-07-01

## 2022-04-04 RX ORDER — LOSARTAN POTASSIUM 25 MG/1
TABLET ORAL
Qty: 90 TABLET | Refills: 1 | Status: SHIPPED | OUTPATIENT
Start: 2022-04-04 | End: 2022-10-03

## 2022-06-15 ENCOUNTER — OFFICE VISIT (OUTPATIENT)
Dept: FAMILY MEDICINE CLINIC | Facility: CLINIC | Age: 72
End: 2022-06-15

## 2022-06-15 VITALS
TEMPERATURE: 97.5 F | DIASTOLIC BLOOD PRESSURE: 81 MMHG | HEIGHT: 69 IN | OXYGEN SATURATION: 98 % | HEART RATE: 95 BPM | WEIGHT: 210 LBS | BODY MASS INDEX: 31.1 KG/M2 | SYSTOLIC BLOOD PRESSURE: 152 MMHG

## 2022-06-15 DIAGNOSIS — E78.2 MIXED HYPERLIPIDEMIA: ICD-10-CM

## 2022-06-15 DIAGNOSIS — Z12.5 PROSTATE CANCER SCREENING: ICD-10-CM

## 2022-06-15 DIAGNOSIS — M54.40 ACUTE LEFT-SIDED LOW BACK PAIN WITH SCIATICA, SCIATICA LATERALITY UNSPECIFIED: Primary | ICD-10-CM

## 2022-06-15 DIAGNOSIS — R03.0 ELEVATED BLOOD PRESSURE READING WITHOUT DIAGNOSIS OF HYPERTENSION: ICD-10-CM

## 2022-06-15 DIAGNOSIS — R73.03 PREDIABETES: ICD-10-CM

## 2022-06-15 PROCEDURE — 99214 OFFICE O/P EST MOD 30 MIN: CPT | Performed by: NURSE PRACTITIONER

## 2022-06-15 RX ORDER — METHYLPREDNISOLONE 4 MG/1
TABLET ORAL
Qty: 21 TABLET | Refills: 0 | Status: SHIPPED | OUTPATIENT
Start: 2022-06-15 | End: 2022-06-28 | Stop reason: SDUPTHER

## 2022-06-15 RX ORDER — MELOXICAM 15 MG/1
15 TABLET ORAL DAILY
Qty: 30 TABLET | Refills: 1 | Status: SHIPPED | OUTPATIENT
Start: 2022-06-15 | End: 2022-08-24

## 2022-06-15 NOTE — PROGRESS NOTES
"Chief Complaint  Labs Only (Pt here to go over labs) and Hip Pain (Pt c/o of hip pain)    Subjective        Juan C Kumar presents to [facility]  Pleasant gentleman here today complains of left low back pain, intermittently with position but pretty much daily,  He has periods where improved and for example he went out and cut his grass,  But he called his pain left low beltline, it radiates down his left lateral and sometimes medial thigh  No weakness no saddlebag paresthesias no associated bowel bladder incontinence or retention, no fevers chills night sweats or unexplained weight loss.  He has no history of malignancy  And no risk factors for osteomyelitis.    He had similar complaints several years ago and had an MRI with some degenerative changes throughout but no significant stenosis.  And no disc herniations.  He has been taking ibuprofen quite a bit not helping too much.    Hip Pain         Objective   Vital Signs:  /81   Pulse 95   Temp 97.5 °F (36.4 °C)   Ht 175.3 cm (69\")   Wt 95.3 kg (210 lb)   SpO2 98%   BMI 31.01 kg/m²   Estimated body mass index is 31.01 kg/m² as calculated from the following:    Height as of this encounter: 175.3 cm (69\").    Weight as of this encounter: 95.3 kg (210 lb).          Physical Exam  Vitals reviewed.   Constitutional:       Appearance: Normal appearance. He is well-developed. He is not ill-appearing or diaphoretic.   HENT:      Head: Normocephalic.      Nose: Nose normal.   Eyes:      General: No scleral icterus.     Conjunctiva/sclera: Conjunctivae normal.      Pupils: Pupils are equal, round, and reactive to light.   Neck:      Thyroid: No thyromegaly.      Vascular: No JVD.   Cardiovascular:      Rate and Rhythm: Normal rate and regular rhythm.      Heart sounds: Normal heart sounds. No murmur heard.    No friction rub. No gallop.   Pulmonary:      Effort: Pulmonary effort is normal. No respiratory distress.      Breath sounds: " Normal breath sounds. No stridor. No wheezing or rales.   Abdominal:      General: Bowel sounds are normal. There is no distension.      Palpations: Abdomen is soft.      Tenderness: There is no abdominal tenderness.      Comments: No hepatosplenomegaly, no ascites,   Musculoskeletal:         General: No tenderness.      Cervical back: Neck supple.      Comments: Patient grimacing getting onto the table no significant pain when ranging his hip internal and external rotation abduction abduction negative straight leg raise plantar flexion dorsiflexion normal pain into his low back when lying flat   Lymphadenopathy:      Cervical: No cervical adenopathy.   Skin:     General: Skin is warm and dry.      Capillary Refill: Capillary refill takes less than 2 seconds.      Findings: No erythema or rash.   Neurological:      General: No focal deficit present.      Mental Status: He is alert and oriented to person, place, and time.      Deep Tendon Reflexes: Reflexes are normal and symmetric.   Psychiatric:         Mood and Affect: Mood normal.         Behavior: Behavior normal.         Thought Content: Thought content normal.         Judgment: Judgment normal.        Result Review :                Assessment and Plan   Diagnoses and all orders for this visit:    1. Acute left-sided low back pain with sciatica, sciatica laterality unspecified (Primary)  -     Ambulatory Referral to Physical Therapy Evaluate and treat  -     CBC & Differential; Future  -     Comprehensive Metabolic Panel; Future  -     Lipid Panel With LDL / HDL Ratio; Future  -     TSH Rfx On Abnormal To Free T4; Future  -     PSA Screen; Future  -     Hemoglobin A1c; Future  -     XR Spine Lumbar 2 or 3 View    2. Prediabetes  -     CBC & Differential; Future  -     Comprehensive Metabolic Panel; Future  -     Lipid Panel With LDL / HDL Ratio; Future  -     TSH Rfx On Abnormal To Free T4; Future  -     PSA Screen; Future  -     Hemoglobin A1c; Future    3.  Elevated blood pressure reading without diagnosis of hypertension  -     CBC & Differential; Future  -     Comprehensive Metabolic Panel; Future  -     Lipid Panel With LDL / HDL Ratio; Future  -     TSH Rfx On Abnormal To Free T4; Future  -     PSA Screen; Future  -     Hemoglobin A1c; Future    4. Prostate cancer screening  -     CBC & Differential; Future  -     Comprehensive Metabolic Panel; Future  -     Lipid Panel With LDL / HDL Ratio; Future  -     TSH Rfx On Abnormal To Free T4; Future  -     PSA Screen; Future  -     Hemoglobin A1c; Future    5. Mixed hyperlipidemia  -     CBC & Differential; Future  -     Comprehensive Metabolic Panel; Future  -     Lipid Panel With LDL / HDL Ratio; Future  -     TSH Rfx On Abnormal To Free T4; Future  -     PSA Screen; Future  -     Hemoglobin A1c; Future    Other orders  -     meloxicam (MOBIC) 15 MG tablet; Take 1 tablet by mouth Daily. As needed for pain take with food and water  Dispense: 30 tablet; Refill: 1  -     methylPREDNISolone (MEDROL) 4 MG dose pack; Take as directed on package instructions.  Dispense: 21 tablet; Refill: 0             Follow Up   Return in about 6 weeks (around 7/27/2022).  Patient was given instructions and counseling regarding his condition or for health maintenance advice. Please see specific information pulled into the AVS if appropriate.     Discharge instructions  Medrol Dosepak now  Take all now when you get home or preferably in the morning    Work around the pain not through the pain,  Physical therapy to stretch your ligaments, increased muscle tone,  Weakness fever chills  Worst pain ever, associated chest pain abdominal pain lethargy groin paresthesias incontinence or urinary fecal retention emergency room  Follow-up in 6 to 8 weeks  If not improving need to go further with MRI,  Of your lumbar spine and/or x-rays.    When taking Medrol Dosepak avoid anti-inflammatories such as Aleve ibuprofen or meloxicam your prescription today  after you finish the Medrol Dosepak start meloxicam 15 mg daily if you continue to have pain for up to 3 weeks  Longer if need be but this is a short-term medication extra water and take with food to protect her stomach and kidneys discontinue if any issues okay to take Tylenol with an anti-inflammatory temporarily okay to take Tylenol with Medrol  Heat ice as needed if increasing pain  Or needing recheck urgent recheck here TRUONG Correia    Medrol steroid pack  Steroidal anti-inflammatory    Meloxicam nonsteroidal anti-inflammatory similar to ibuprofen naproxen  Pain reliever as well    Acetaminophen Tylenol a pain reliever not an anti-inflammatory    If your pain is not improved by next week go ahead and get an outpatient x-ray while waiting to get in to physical therapy

## 2022-06-15 NOTE — PATIENT INSTRUCTIONS
Discharge instructions  Medrol Dosepak now  Take all now when you get home or preferably in the morning    Work around the pain not through the pain,  Physical therapy to stretch your ligaments, increased muscle tone,  Weakness fever chills  Worst pain ever, associated chest pain abdominal pain lethargy groin paresthesias incontinence or urinary fecal retention emergency room  Follow-up in 6 to 8 weeks  If not improving need to go further with MRI,  Of your lumbar spine and/or x-rays.    When taking Medrol Dosepak avoid anti-inflammatories such as Aleve ibuprofen or meloxicam your prescription today after you finish the Medrol Dosepak start meloxicam 15 mg daily if you continue to have pain for up to 3 weeks  Longer if need be but this is a short-term medication extra water and take with food to protect her stomach and kidneys discontinue if any issues okay to take Tylenol with an anti-inflammatory temporarily okay to take Tylenol with Medrol  Heat ice as needed if increasing pain  Or needing recheck urgent recheck here TRUONG Correia    Medrol steroid pack  Steroidal anti-inflammatory    Meloxicam nonsteroidal anti-inflammatory similar to ibuprofen naproxen  Pain reliever as well    Acetaminophen Tylenol a pain reliever not an anti-inflammatory    If your pain is not improved by next week go ahead and get an outpatient x-ray while waiting to get in to physical therapy

## 2022-06-17 ENCOUNTER — TELEPHONE (OUTPATIENT)
Dept: FAMILY MEDICINE CLINIC | Facility: CLINIC | Age: 72
End: 2022-06-17

## 2022-06-17 RX ORDER — BUPROPION HYDROCHLORIDE 150 MG/1
150 TABLET, EXTENDED RELEASE ORAL DAILY
Qty: 90 TABLET | Refills: 1 | Status: SHIPPED | OUTPATIENT
Start: 2022-06-17 | End: 2022-12-13

## 2022-06-17 NOTE — TELEPHONE ENCOUNTER
Rx Refill Note  Requested Prescriptions     Pending Prescriptions Disp Refills   • buPROPion SR (WELLBUTRIN SR) 150 MG 12 hr tablet 90 tablet 1     Sig: Take 1 tablet by mouth Daily. for depression      Last office visit with prescribing clinician: 6/15/2022      Next office visit with prescribing clinician: Visit date not found            Diana Juarez MA  06/17/22, 12:06 EDT

## 2022-06-17 NOTE — TELEPHONE ENCOUNTER
PATIENT CALLED TO REQUEST A REFILL FOR HIS buPROPion SR (WELLBUTRIN SR) 150 MG 12 hr tablet MEDICATION. PATIENT WOULD LIKE PRESCRIPTION SENT TO PHARMACY ON FILE.     PLEASE ADVISE

## 2022-06-20 ENCOUNTER — TELEPHONE (OUTPATIENT)
Dept: FAMILY MEDICINE CLINIC | Facility: CLINIC | Age: 72
End: 2022-06-20

## 2022-06-20 NOTE — TELEPHONE ENCOUNTER
PATIENT IS REQUESTING US TO SEND HIS REFERRAL FOR PHYSICAL THERAPY [facility] ...    FAX# [phone]    PATIENT> [phone]

## 2022-06-27 DIAGNOSIS — M54.42 LEFT-SIDED LOW BACK PAIN WITH LEFT-SIDED SCIATICA, UNSPECIFIED CHRONICITY: Primary | ICD-10-CM

## 2022-06-28 ENCOUNTER — OFFICE VISIT (OUTPATIENT)
Dept: FAMILY MEDICINE CLINIC | Facility: CLINIC | Age: 72
End: 2022-06-28

## 2022-06-28 ENCOUNTER — TELEPHONE (OUTPATIENT)
Dept: FAMILY MEDICINE CLINIC | Facility: CLINIC | Age: 72
End: 2022-06-28

## 2022-06-28 VITALS
RESPIRATION RATE: 12 BRPM | BODY MASS INDEX: 30.57 KG/M2 | HEIGHT: 69 IN | WEIGHT: 206.4 LBS | DIASTOLIC BLOOD PRESSURE: 99 MMHG | OXYGEN SATURATION: 99 % | TEMPERATURE: 97.1 F | SYSTOLIC BLOOD PRESSURE: 155 MMHG | HEART RATE: 100 BPM

## 2022-06-28 DIAGNOSIS — M54.50 SEVERE LOW BACK PAIN: ICD-10-CM

## 2022-06-28 DIAGNOSIS — M54.16 LUMBAR RADICULOPATHY: Primary | ICD-10-CM

## 2022-06-28 DIAGNOSIS — Z79.899 HIGH RISK MEDICATION USE: ICD-10-CM

## 2022-06-28 PROCEDURE — 99214 OFFICE O/P EST MOD 30 MIN: CPT | Performed by: NURSE PRACTITIONER

## 2022-06-28 RX ORDER — HYDROCODONE BITARTRATE AND ACETAMINOPHEN 10; 325 MG/1; MG/1
TABLET ORAL
Qty: 18 TABLET | Refills: 0 | Status: SHIPPED | OUTPATIENT
Start: 2022-06-28 | End: 2022-08-24

## 2022-06-28 RX ORDER — METHYLPREDNISOLONE 4 MG/1
TABLET ORAL
Qty: 21 TABLET | Refills: 0 | Status: SHIPPED | OUTPATIENT
Start: 2022-06-28 | End: 2022-07-11

## 2022-06-28 NOTE — PATIENT INSTRUCTIONS
Discharge instructions    Activity as tolerated move slower, take more time to do things to avoid injury especially the next week or 2  Hold off on therapy for a few days, until better and can do more therapy.  Medrol Dosepak now  Recheck blood pressure tomorrow  Hydrocodone with caution lowest effective dose  Stool softener as needed Colace etc. push fluids severe pain fever chills weakness bowel bladder incontinence or retention emergency room    Recheck Friday  MRI lumbar spine as soon as possible

## 2022-06-28 NOTE — TELEPHONE ENCOUNTER
Patient was expecting a call from Rodolfo this morning at 9:00am and never heard from her, he would like to speak with Rodolfo. He said its about his recent back pain and scheduling an appt with Epley.    Please advise

## 2022-06-29 NOTE — PROGRESS NOTES
"Chief Complaint  Back Pain    Subjective        Juan C Kumar presents to [facility]  Follow-up severe low back pain and sciatica, patient went to emergency room pain was unbearable.  MRI is pending I sent a referral he has been doing exercises, steroids still having considerable pain the steroid has helped but he was moving on the garage and his back pain is worse,  Has been 10 out of 10 pain difficulty sleeping he had old prescription for oxycodone he has no history of drug abuse  Alcohol abuse or any family addiction or dependency issues  He had taken those several times this week helped some he has no drug-seeking behavior nonetheless more conservative options they are not helping.    Back Pain        Objective   Vital Signs:  /99   Pulse 100   Temp 97.1 °F (36.2 °C) (Infrared)   Resp 12   Ht 175.3 cm (69\")   Wt 93.6 kg (206 lb 6.4 oz)   SpO2 99%   BMI 30.48 kg/m²   Estimated body mass index is 30.48 kg/m² as calculated from the following:    Height as of this encounter: 175.3 cm (69\").    Weight as of this encounter: 93.6 kg (206 lb 6.4 oz).          Physical Exam  Constitutional:       General: He is not in acute distress.     Appearance: Normal appearance. He is not ill-appearing, toxic-appearing or diaphoretic.      Comments: Pleasant appropriate accompanied by wife for support   Musculoskeletal:      Comments: Grimacing with position change on the table, no focal weakness, lumbar spine nontender.  Reflexes intact   Neurological:      General: No focal deficit present.      Mental Status: He is alert and oriented to person, place, and time.   Psychiatric:         Mood and Affect: Mood normal.         Behavior: Behavior normal.        Result Review :                Assessment and Plan   Diagnoses and all orders for this visit:    1. Lumbar radiculopathy (Primary)  -     ToxASSURE Select 13 (MW) - Urine, Clean Catch    2. Severe low back pain  -     ToxASSURE Select " 13 (MW) - Urine, Clean Catch    3. High risk medication use  -     ToxASSURE Select 13 (MW) - Urine, Clean Catch    Other orders  -     methylPREDNISolone (MEDROL) 4 MG dose pack; Take as directed on package instructions.  Dispense: 21 tablet; Refill: 0  -     HYDROcodone-acetaminophen (NORCO)  MG per tablet; 1/2 to 1 tablet every 4-6 hours as needed for pain, caution sedation use sparingly  Dispense: 18 tablet; Refill: 0      Patient has legitimate severe uncontrolled pain cannot sleep, quality life is poor.  He is already been to the urgent care as well as MRIs pending  Reviewed Willian koroma  Requested tox assure controlled substance agreement  Discussed more conservative options he will continue physical therapy  Discussed alternatives risk of addiction dependence overdose severe injury death  Proper storage disposal expectations    Unfortunately and unexpectedly [facility] staff has left and I have no one to collect his urine  For controlled subs agreement he will recheck Friday and will get these at that time  Reviewed thoroughly expectations and nature of controlled substance agreement  With verbal acknowledgment and consent           Follow Up   Return friday.  Patient was given instructions and counseling regarding his condition or for health maintenance advice. Please see specific information pulled into the AVS if appropriate.

## 2022-07-01 ENCOUNTER — OFFICE VISIT (OUTPATIENT)
Dept: FAMILY MEDICINE CLINIC | Facility: CLINIC | Age: 72
End: 2022-07-01

## 2022-07-01 VITALS
BODY MASS INDEX: 30.63 KG/M2 | SYSTOLIC BLOOD PRESSURE: 144 MMHG | DIASTOLIC BLOOD PRESSURE: 85 MMHG | OXYGEN SATURATION: 95 % | HEART RATE: 98 BPM | HEIGHT: 69 IN | WEIGHT: 206.8 LBS | TEMPERATURE: 96.4 F

## 2022-07-01 DIAGNOSIS — Z79.899 HIGH RISK MEDICATION USE: ICD-10-CM

## 2022-07-01 DIAGNOSIS — M54.16 LUMBAR RADICULOPATHY, ACUTE: Primary | ICD-10-CM

## 2022-07-01 PROCEDURE — 99213 OFFICE O/P EST LOW 20 MIN: CPT | Performed by: NURSE PRACTITIONER

## 2022-07-01 RX ORDER — ATORVASTATIN CALCIUM 40 MG/1
TABLET, FILM COATED ORAL
Qty: 90 TABLET | Refills: 3 | Status: SHIPPED | OUTPATIENT
Start: 2022-07-01

## 2022-07-01 NOTE — PATIENT INSTRUCTIONS
Discharge instructions continue present plan  Avoid activities which may pinch her nerve and cause you further pain, this will get better over the next several weeks for several months hopefully physical therapy work around the pain not through the pain  Follow-up after MRI should she have fever chills weakness severe uncontrolled pain saddlebag paresthesias bowel or bladder retention or incontinence emergency room.

## 2022-07-01 NOTE — PROGRESS NOTES
"Chief Complaint  Back Pain    Subjective        Juan C Kumar presents to [facility]  Very pleasant patient here today follow-up severe low back pain lumbar radiculopathy, patient's had significant and severe back pain decreasing quality of life,  He went to the urgent care Sunday due to his severe pain, I requested he return to office to discuss a temporary measure  And I had seen him 2 days ago, as his pain was uncontrolled with more conservative measures we had given him a temporary prescription for hydrocodone, he is here today for follow-up he has had significant relief with the hydrocodone as well as we resumed his steroid he feels like the anti-inflammatory steroid has helped more and anything.  Presently has been withholding PT why his had an exacerbation of his pain presently is mild pain that radiates down his left leg and medial thigh anterior leg.  No weakness bowel bladder change no bowel bladder incontinence or retention.  Or saddlebag paresthesias.  No fever chills night sweats or unexplained weight loss no history of malignancy.  He plans on resuming physical therapy on Wednesday.    Back Pain  This is a recurrent problem. The current episode started 1 to 4 weeks ago. The problem occurs constantly. The problem has been waxing and waning since onset. The pain is present in the sacro-iliac. The quality of the pain is described as aching. The pain radiates to the left thigh. The pain is at a severity of 9/10. The pain is the same all the time. The symptoms are aggravated by twisting. Stiffness is present all day. Associated symptoms include numbness and paresthesias. Pertinent negatives include no abdominal pain, bladder incontinence, bowel incontinence, dysuria, fever, headaches, leg pain, paresis, pelvic pain, perianal numbness, tingling, weakness or weight loss.       Objective   Vital Signs:  /85   Pulse 98   Temp 96.4 °F (35.8 °C)   Ht 175.3 cm (69\")   Wt " "93.8 kg (206 lb 12.8 oz)   SpO2 95%   BMI 30.54 kg/m²   Estimated body mass index is 30.54 kg/m² as calculated from the following:    Height as of this encounter: 175.3 cm (69\").    Weight as of this encounter: 93.8 kg (206 lb 12.8 oz).    BMI is >= 30 and <35. (Class 1 Obesity). The following options were offered after discussion;: exercise counseling/recommendations and nutrition counseling/recommendations      Physical Exam  Constitutional:       Appearance: Normal appearance.   Skin:     General: Skin is warm and dry.   Neurological:      General: No focal deficit present.      Mental Status: He is alert and oriented to person, place, and time.   Psychiatric:         Mood and Affect: Mood normal.         Behavior: Behavior normal.        Result Review :                Assessment and Plan   There are no diagnoses linked to this encounter.       I spent 20  minutes caring for Juan C on this date of service. This time includes time spent by me in the following activities:reviewing tests, obtaining and/or reviewing a separately obtained history, performing a medically appropriate examination and/or evaluation , counseling and educating the patient/family/caregiver, ordering medications, tests, or procedures, documenting information in the medical record and care coordination  Follow Up   No follow-ups on file.  Patient was given instructions and counseling regarding his condition or for health maintenance advice. Please see specific information pulled into the AVS if appropriate.       Answers for HPI/ROS submitted by the patient on 6/29/2022  What is the primary reason for your visit?: Back Pain    Controlled substance agreement completed today toxassure collected  Patient is improving from treatment  Discussed safety prevention appropriate use reinforced safety goals  Pain relief goals    Discharge instructions continue present plan  Avoid activities which may pinch her nerve and cause you further pain, this will " get better over the next several weeks for several months hopefully physical therapy work around the pain not through the pain  Follow-up after MRI should she have fever chills weakness severe uncontrolled pain saddlebag paresthesias bowel or bladder retention or incontinence emergency room.

## 2022-07-06 DIAGNOSIS — M25.552 LEFT HIP PAIN: Primary | ICD-10-CM

## 2022-07-07 ENCOUNTER — HOSPITAL ENCOUNTER (OUTPATIENT)
Dept: GENERAL RADIOLOGY | Facility: HOSPITAL | Age: 72
Discharge: HOME | End: 2022-07-07

## 2022-07-07 PROCEDURE — 73502 X-RAY EXAM HIP UNI 2-3 VIEWS: CPT

## 2022-07-07 PROCEDURE — 72100 X-RAY EXAM L-S SPINE 2/3 VWS: CPT

## 2022-07-10 LAB — DRUGS UR: NORMAL

## 2022-07-11 RX ORDER — SILDENAFIL 100 MG/1
TABLET, FILM COATED ORAL
Qty: 9 TABLET | Refills: 5 | Status: SHIPPED | OUTPATIENT
Start: 2022-07-11

## 2022-07-11 RX ORDER — METHYLPREDNISOLONE 4 MG/1
TABLET ORAL
Qty: 21 TABLET | Refills: 0 | Status: SHIPPED | OUTPATIENT
Start: 2022-07-11 | End: 2022-08-24

## 2022-07-11 NOTE — TELEPHONE ENCOUNTER
Rx Refill Note  Requested Prescriptions     Pending Prescriptions Disp Refills   • sildenafil (VIAGRA) 100 MG tablet [Pharmacy Med Name: SILDENAFIL 100 MG TABLET] 9 tablet 5     Sig: TAKE ONE-HALF TO ONE TABLET BY MOUTH 1 HOUR PRIOR TO INTIMACY AS NEEDED DAILY   • methylPREDNISolone (MEDROL) 4 MG dose pack [Pharmacy Med Name: methylPREDNISolone 4 MG DOSEPK] 21 tablet 0     Sig: TAKE BY MOUTH AS INSTRUCTED - PER PACKAGE INSTRUCTIONS      Last office visit with prescribing clinician: 7/1/2022      Next office visit with prescribing clinician: Visit date not found            Alta Reyes MA  07/11/22, 12:31 EDT

## 2022-07-14 ENCOUNTER — TELEPHONE (OUTPATIENT)
Dept: FAMILY MEDICINE CLINIC | Facility: CLINIC | Age: 72
End: 2022-07-14

## 2022-07-14 NOTE — TELEPHONE ENCOUNTER
Pt calling regarding covid positive. Epley advised him to let him know if he has tested positive and his wife has, he is unsure about himself but believes he also has it.     Best call back: [phone]    Please advise.

## 2022-07-14 NOTE — TELEPHONE ENCOUNTER
CALLED SPOKE TO PT IN DETAIL. PT IS SCHEDULED TO HAVE A TELEHEALTH WITH KATE TOMORROW AT 1. I DID ADVISE PT THAT IF HE HAS ANY EXTREME SHORTNESS OF BREATH, FATIGUE, LETHARGY, WEAKNESS THEN TO GO TO THE EMERGENCY ROOM. I ALSO LET PT KNOW TO STAY HYDRATED AND TO TAKE IBUPROFEN AND DRINK PLENTY OF WATER.

## 2022-07-15 ENCOUNTER — OFFICE VISIT (OUTPATIENT)
Dept: FAMILY MEDICINE CLINIC | Facility: CLINIC | Age: 72
End: 2022-07-15

## 2022-07-15 DIAGNOSIS — U07.1 COVID-19 VIRUS INFECTION: Primary | ICD-10-CM

## 2022-07-15 PROCEDURE — 99214 OFFICE O/P EST MOD 30 MIN: CPT | Performed by: NURSE PRACTITIONER

## 2022-07-15 NOTE — PATIENT INSTRUCTIONS
Discharge instructions push fluids treat fever body aches headache myalgia  Chills although fever equivalents with ibuprofen 600 or 800   3 times a day for the next 2 to 3 days  Start antiviral paxlovid  Okay to stop cholesterol medicine during administration.  Fluids fluids fluids if high fever chest pain shortness of breath weakness immediately to the emergency room low threshold for recheck sent via message Tuesday Wednesday and update your condition

## 2022-07-15 NOTE — PROGRESS NOTES
"Chief Complaint  Covid-19 Home Monitoring Phone Call    Subjective        Juan C Kumar presents to [facility]  Very pleasant gentleman who was tested positive for COVID yesterday, has some fatigue mild cough congestion runny nose some chills but no chest pain shortness of breath lethargy or severe complaints,  His wife tested positive for COVID as well yesterday  He is interested in the antiviral he has had a least 3 COVID vaccines  He had held off on his last COVID-vaccine due to the Medrol Dosepak and we will plan on getting that about this time  Better with his low back and radiculopathy pain osteoarthritis of his bilateral hips worse on the left as well as some degenerative changes and some really significant spondylolisthesis of his lumbar  He has an MRI scheduled in a couple weeks      Objective   Vital Signs:  There were no vitals taken for this visit.  Estimated body mass index is 30.54 kg/m² as calculated from the following:    Height as of 7/1/22: 175.3 cm (69\").    Weight as of 7/1/22: 93.8 kg (206 lb 12.8 oz).          Physical Exam  Constitutional:       General: He is not in acute distress.     Appearance: Normal appearance. He is not ill-appearing, toxic-appearing or diaphoretic.   Eyes:      Conjunctiva/sclera: Conjunctivae normal.      Pupils: Pupils are equal, round, and reactive to light.   Pulmonary:      Effort: Pulmonary effort is normal.      Breath sounds: No stridor.      Comments: Respirations less than 20 able to complete full sentences without dyspnea  Skin:     General: Skin is warm and dry.   Neurological:      General: No focal deficit present.      Mental Status: He is alert and oriented to person, place, and time.   Psychiatric:         Mood and Affect: Mood normal.         Behavior: Behavior normal.         Thought Content: Thought content normal.         Judgment: Judgment normal.        Result Review :                Assessment and Plan   Diagnoses " and all orders for this visit:    1. COVID-19 virus infection (Primary)  -     Nirmatrelvir & Ritonavir (PAXLOVID) 20 x 150 MG & 10 x 100MG tablet therapy pack tablet; Take 3 tablets by mouth 2 (Two) Times a Day for 5 days. /follow pkt instruct/pharmacist  Dispense: 30 tablet; Refill: 0             Follow Up   No follow-ups on file.  Patient was given instructions and counseling regarding his condition or for health maintenance advice. Please see specific information pulled into the AVS if appropriate.     Discharge instructions push fluids treat fever body aches headache myalgia  Chills although fever equivalents with ibuprofen 600 or 800   3 times a day for the next 2 to 3 days  Start antiviral paxlovid  Okay to stop cholesterol medicine during administration.  Fluids fluids fluids if high fever chest pain shortness of breath weakness immediately to the emergency room low threshold for recheck sent via message Tuesday Wednesday and update your condition    Video visit Doximteresa 15-minute with patient permission he is at home I am in the office

## 2022-07-25 ENCOUNTER — HOSPITAL ENCOUNTER (OUTPATIENT)
Dept: MRI IMAGING | Facility: HOSPITAL | Age: 72
Discharge: HOME | End: 2022-07-25
Admitting: NURSE PRACTITIONER

## 2022-07-25 PROCEDURE — 72148 MRI LUMBAR SPINE W/O DYE: CPT

## 2022-07-27 ENCOUNTER — TELEPHONE (OUTPATIENT)
Dept: NEUROSURGERY | Facility: CLINIC | Age: 72
End: 2022-07-27

## 2022-07-27 DIAGNOSIS — M89.8X8 MASS OF SPINE: Primary | ICD-10-CM

## 2022-07-27 NOTE — TELEPHONE ENCOUNTER
PATIENT CALLED AND REQUESTED AN APPOINTMENT WITH DR. DAMON D/T RECENT MRI RESULTS. I WALKED THE PATIENT THROUGH REFERRAL PROCESS; REFERRAL WOULD LIKELY BE COMING FROM HIS PCP EPLEY APRN; ADVISED HIM THAT SINCE REFERRING PROVIDER IS WITH ; ALL OVN'S AND MRI IMAGING COMPLETED AT  IS VISIBLE AND REFERRAL CAN BE GENERATED ELECTRONICALLY.     HE STATED REFERRAL MAY BE FOR AN URGENT ISSUE; I ADVISED OF URGENT REFERRAL PROCESSING TIMELINE (ALL RECORDS TO REFERRED TO MD WITHIN 24 HOURS FOR URGENT SCHEDULING REVIEW BY MD), ALSO ADVISED PATIENT DR. DAMON IS CURRENTLY BOOKING OUT FOR NEW PATIENTS UNTIL January 2023, BUT URGENT REFERRALS WOULD BE REVIEWED BY MD FOR SCHEDULING URGENCY/GUIDANCE.    PATIENT EXPRESSED UNDERSTANDING AND WOULD DISCUSS FURTHER WITH HIS PCP.

## 2022-08-02 RX ORDER — SEMAGLUTIDE 1.34 MG/ML
0.5 INJECTION, SOLUTION SUBCUTANEOUS WEEKLY
Qty: 3 PEN | Refills: 0 | Status: SHIPPED | OUTPATIENT
Start: 2022-08-02 | End: 2022-11-10

## 2022-08-08 RX ORDER — LORATADINE AND PSEUDOEPHEDRINE SULFATE 5; 120 MG/1; MG/1
TABLET, EXTENDED RELEASE ORAL
Qty: 30 TABLET | Refills: 3 | Status: SHIPPED | OUTPATIENT
Start: 2022-08-08 | End: 2023-02-21 | Stop reason: SDUPTHER

## 2022-08-08 NOTE — TELEPHONE ENCOUNTER
Rx Refill Note  Requested Prescriptions     Pending Prescriptions Disp Refills   • Claritin-D 12 Hour 5-120 MG per 12 hr tablet [Pharmacy Med Name: CLARITIN-D 12 HOUR TABLET] 30 tablet 3     Sig: TAKE ONE TABLET BY MOUTH TWICE A DAY -- DO NOT TAKE IF SYSTOLIC BLOOD PRESSURE IS GREATER THAN 140      Last office visit with prescribing clinician: 7/15/2022      Next office visit with prescribing clinician: Visit date not found            Yanet Dumont MA  08/08/22, 10:20 EDT

## 2022-08-18 ENCOUNTER — APPOINTMENT (OUTPATIENT)
Dept: MRI IMAGING | Facility: HOSPITAL | Age: 72
End: 2022-08-18

## 2022-08-18 NOTE — PROGRESS NOTES
Subjective   Patient ID: Juan C Kumar is a 72 y.o. male is being seen for consultation today at the request of James Epley, APRN.  Patient complains of low back pain with left leg pain with intermittent numbness and tingling.  Patient had MRI at [facility] completed on 7/25/22.      History of Present Illness    {Common H&P Review Areas:18191}    Review of Systems    ***    Objective     There were no vitals filed for this visit.  There is no height or weight on file to calculate BMI.      Physical Exam  Neurologic Exam        Assessment & Plan   Independent Review of Radiographic Studies:      I personally reviewed the images from the following studies.    ***    Medical Decision Making:      ***   There are no diagnoses linked to this encounter.  No follow-ups on file.

## 2022-08-19 ENCOUNTER — HOSPITAL ENCOUNTER (OUTPATIENT)
Dept: MRI IMAGING | Facility: HOSPITAL | Age: 72
Discharge: HOME | End: 2022-08-19
Admitting: NURSE PRACTITIONER

## 2022-08-19 PROCEDURE — A9577 INJ MULTIHANCE: HCPCS | Performed by: NURSE PRACTITIONER

## 2022-08-19 PROCEDURE — 72158 MRI LUMBAR SPINE W/O & W/DYE: CPT

## 2022-08-19 PROCEDURE — 0 GADOBENATE DIMEGLUMINE 529 MG/ML SOLUTION: Performed by: NURSE PRACTITIONER

## 2022-08-19 PROCEDURE — 82565 ASSAY OF CREATININE: CPT

## 2022-08-19 RX ADMIN — GADOBENATE DIMEGLUMINE 20 ML: 529 INJECTION, SOLUTION INTRAVENOUS at 10:43

## 2022-08-20 LAB — CREAT BLDA-MCNC: 0.8 MG/DL (ref 0.6–1.3)

## 2022-08-24 ENCOUNTER — OFFICE VISIT (OUTPATIENT)
Dept: NEUROSURGERY | Facility: CLINIC | Age: 72
End: 2022-08-24

## 2022-08-24 VITALS
HEART RATE: 86 BPM | HEIGHT: 69 IN | BODY MASS INDEX: 26.81 KG/M2 | RESPIRATION RATE: 18 BRPM | WEIGHT: 181 LBS | DIASTOLIC BLOOD PRESSURE: 74 MMHG | SYSTOLIC BLOOD PRESSURE: 130 MMHG | OXYGEN SATURATION: 96 %

## 2022-08-24 DIAGNOSIS — D49.2 NERVE SHEATH TUMOR: Primary | ICD-10-CM

## 2022-08-24 PROCEDURE — 99203 OFFICE O/P NEW LOW 30 MIN: CPT | Performed by: NEUROLOGICAL SURGERY

## 2022-08-24 NOTE — PROGRESS NOTES
Patient ID: Juan C Kumar is a 72 y.o. male is being seen for consultation today at the request of James Epley, APRN.Patient complains of low back pain with left leg pain with intermittent numbness and tingling with weakness. Patient states that his pain started in 2015.  ITcan radiate from his leg down to his feet.  Patient had MRI at [facility] completed on 7/25/22.  No history of trauma and no history of back surgery. Patient has tried physical therapy in the past and it did help.        Subjective     The patient is here in regards to   Chief Complaint   Patient presents with   • Back Pain   The patient presents with a remote history of back pain that began after he and his wife were taking dancing lessons.  He had some low back pain and radiculopathy that occurred after straining his back doing this.  As part of the work-up for his radiculopathy he had an MRI of the lumbar spine without contrast that was concerning for nerve sheath tumor.  He then completed some physical therapy as well as conservative over-the-counter treatment for his pain which resolved very well.  He then had a repeat MRI this time with contrast to better characterize the suspected nerve sheath tumor and he is here for consultation in regards to those findings.  He denies any bowel or bladder issues or any weakness or numbness.    History of Present Illness    While in the room and during my examination of the patient I wore a mask and eye protection.  I washed my hands before and after this patient encounter.  The patient was also wearing a mask.    The following portions of the patient's history were reviewed and updated as appropriate: allergies, current medications, past family history, past medical history, past social history, past surgical history and problem list.    Review of Systems   HENT: Negative.    Eyes: Negative.    Respiratory: Negative.    Cardiovascular: Negative.    Gastrointestinal: Negative.    Endocrine: Negative.     Genitourinary: Negative.    Musculoskeletal: Positive for back pain.   Skin: Negative.    Allergic/Immunologic: Negative.    Neurological: Positive for weakness and numbness.   Hematological: Negative.    Psychiatric/Behavioral: Negative.         Past Medical History:   Diagnosis Date   • Allergic    • Arthritis    • Back pain    • Basal cell carcinoma    • Colon polyps    • Hyperlipidemia    • Numbness and tingling in hands     and legs   • Numbness and tingling of left leg        No Known Allergies    Family History   Problem Relation Age of Onset   • Hyperlipidemia Mother    • Cancer Mother    • Hyperlipidemia Father    • Cancer Father    • Cancer Brother    • Melanoma Brother        Social History     Socioeconomic History   • Marital status:    Tobacco Use   • Smoking status: Former Smoker   • Smokeless tobacco: Never Used   Substance and Sexual Activity   • Alcohol use: Yes     Comment: social   • Drug use: No   • Sexual activity: Defer       Past Surgical History:   Procedure Laterality Date   • COLONOSCOPY     • TONSILLECTOMY           Objective     Vitals:    08/24/22 1254   BP: 130/74   Pulse: 86   Resp: 18   SpO2: 96%     Body mass index is 26.73 kg/m².    Physical Exam  Constitutional:       Appearance: Normal appearance.   HENT:      Head: Normocephalic and atraumatic.   Eyes:      Extraocular Movements: Extraocular movements intact.      Conjunctiva/sclera: Conjunctivae normal.      Pupils: Pupils are equal, round, and reactive to light.   Cardiovascular:      Rate and Rhythm: Normal rate and regular rhythm.      Pulses: Normal pulses.   Pulmonary:      Breath sounds: Normal breath sounds.   Abdominal:      Palpations: Abdomen is soft.   Musculoskeletal:         General: Normal range of motion.      Cervical back: Normal range of motion and neck supple.   Skin:     General: Skin is warm and dry.   Neurological:      Mental Status: He is alert and oriented to person, place, and time.       Cranial Nerves: Cranial nerves are intact.      Motor: Motor function is intact. No weakness or atrophy.      Coordination: Coordination is intact. Romberg sign negative. Romberg Test normal.      Gait: Gait is intact. Gait normal.      Deep Tendon Reflexes: Reflexes are normal and symmetric.      Reflex Scores:       Tricep reflexes are 2+ on the right side and 2+ on the left side.       Bicep reflexes are 2+ on the right side and 2+ on the left side.       Brachioradialis reflexes are 2+ on the right side and 2+ on the left side.       Patellar reflexes are 2+ on the right side and 2+ on the left side.       Achilles reflexes are 2+ on the right side and 2+ on the left side.  Psychiatric:         Speech: Speech normal.         Neurologic Exam     Mental Status   Oriented to person, place, and time.   Attention: normal. Concentration: normal.   Speech: speech is normal   Level of consciousness: alert    Cranial Nerves   Cranial nerves II through XII intact.     CN III, IV, VI   Pupils are equal, round, and reactive to light.    Motor Exam   Muscle bulk: normal  Overall muscle tone: normal    Strength   Strength 5/5 except as noted.     Sensory Exam   Light touch normal.     Gait, Coordination, and Reflexes     Gait  Gait: normal    Coordination   Romberg: negative    Reflexes   Reflexes 2+ except as noted.   Right brachioradialis: 2+  Left brachioradialis: 2+  Right biceps: 2+  Left biceps: 2+  Right triceps: 2+  Left triceps: 2+  Right patellar: 2+  Left patellar: 2+  Right achilles: 2+  Left achilles: 2+  Right Raines: absent  Left Raines: absent  Right ankle clonus: absent  Left pendular knee jerk: absent      Assessment & Plan   Independent Review of Radiographic Studies:      I personally reviewed the images from the following studies.    MRI of the lumbar spine wo contrast was reviewed and shows Some very mild degenerative disease which is good for his age.  He has some mild disc bulging at multiple levels  with the worst DDD at L1-2, which is approximately the level of where a suspected nerve sheath tumor was found on the previous MRI on a T2 sagittal sequence.  On the repeat MRI this object is not present on the T2, T1, post con or axial sequences and is very likely an artifact.  He otherwise has main attained disc heights at other levels and no clinically significant nerve compression    Assessment/Plan: The patient did have some back pain and radiculopathy that has now resolved after conservative treatment.  He is here about his recent MRI in regards to a suspected nerve sheath tumor.  Upon reviewing the images it appears to be an artifact, and even if it is present it is very small and asymptomatic.  We discussed what to do about this and I offered either a 1 year observation and repeat MRI or that he could follow-up as needed if he ever develops any issues with this.    Medical Decision Making:      Follow-up in 1 year with MRI of the lumbar spine with and without contrast         Diagnoses and all orders for this visit:    1. Nerve sheath tumor (Primary)  -     MRI Lumbar Spine With & Without Contrast; Future               Julio Covington MD  08/24/22  13:10 EDT

## 2022-10-03 RX ORDER — LOSARTAN POTASSIUM 25 MG/1
TABLET ORAL
Qty: 90 TABLET | Refills: 1 | Status: SHIPPED | OUTPATIENT
Start: 2022-10-03 | End: 2023-03-30

## 2022-10-19 ENCOUNTER — OFFICE VISIT (OUTPATIENT)
Dept: FAMILY MEDICINE CLINIC | Facility: CLINIC | Age: 72
End: 2022-10-19

## 2022-10-19 VITALS
HEART RATE: 96 BPM | WEIGHT: 205 LBS | OXYGEN SATURATION: 99 % | BODY MASS INDEX: 30.36 KG/M2 | HEIGHT: 69 IN | SYSTOLIC BLOOD PRESSURE: 120 MMHG | DIASTOLIC BLOOD PRESSURE: 82 MMHG

## 2022-10-19 DIAGNOSIS — R09.81 SINUS CONGESTION: Primary | ICD-10-CM

## 2022-10-19 PROCEDURE — 99213 OFFICE O/P EST LOW 20 MIN: CPT | Performed by: NURSE PRACTITIONER

## 2022-10-19 RX ORDER — AZELASTINE 1 MG/ML
2 SPRAY, METERED NASAL 2 TIMES DAILY
Qty: 1 EACH | Refills: 12 | Status: SHIPPED | OUTPATIENT
Start: 2022-10-19

## 2022-10-24 RX ORDER — AMOXICILLIN AND CLAVULANATE POTASSIUM 875; 125 MG/1; MG/1
1 TABLET, FILM COATED ORAL 2 TIMES DAILY
Qty: 20 TABLET | Refills: 0 | Status: SHIPPED | OUTPATIENT
Start: 2022-10-24 | End: 2023-03-14

## 2022-10-25 ENCOUNTER — TELEPHONE (OUTPATIENT)
Dept: FAMILY MEDICINE CLINIC | Facility: CLINIC | Age: 72
End: 2022-10-25

## 2022-10-25 RX ORDER — CEFDINIR 300 MG/1
300 CAPSULE ORAL 2 TIMES DAILY
Qty: 20 CAPSULE | Refills: 0 | Status: SHIPPED | OUTPATIENT
Start: 2022-10-25 | End: 2023-03-14

## 2022-10-25 NOTE — TELEPHONE ENCOUNTER
Caller: Juan C Kumar    Relationship to patient: Self    Best call back number: [phone]    Patient is needing: PATIENT CALLED AND SAID THAT [facility] TOLD HIM THEY DO NOT HAVE THE AMOXICILLIN-CLAVULANATE AND THAT THE  NO LONGER HAS IT EITHER    THEY SAID HE WOULD NEED A NEW PRESCRIPTION FOR SOMETHING ELSE     PHARMACY: [facility] [id number] AT [facility]. - [phone] [facility] [phone]   [phone]    REQUESTED CALLBACK

## 2022-11-07 RX ORDER — FLUTICASONE PROPIONATE 50 MCG
SPRAY, SUSPENSION (ML) NASAL
Qty: 16 ML | Refills: 5 | Status: SHIPPED | OUTPATIENT
Start: 2022-11-07

## 2022-11-07 NOTE — TELEPHONE ENCOUNTER
Rx Refill Note  Requested Prescriptions     Pending Prescriptions Disp Refills   • fluticasone (FLONASE) 50 MCG/ACT nasal spray [Pharmacy Med Name: FLUTICASONE PROP 50 MCG SPRAY] 16 mL 5     Sig: SPRAY TWO SPRAYS IN EACH NOSTRIL ONCE DAILY      Last office visit with prescribing clinician: 10/19/2022      Next office visit with prescribing clinician: Visit date not found            Jaylyn Kellogg Rep  11/07/22, 08:48 EST

## 2022-12-13 RX ORDER — BUPROPION HYDROCHLORIDE 150 MG/1
TABLET, EXTENDED RELEASE ORAL
Qty: 90 TABLET | Refills: 1 | Status: SHIPPED | OUTPATIENT
Start: 2022-12-13

## 2022-12-22 ENCOUNTER — OFFICE VISIT (OUTPATIENT)
Dept: FAMILY MEDICINE CLINIC | Facility: CLINIC | Age: 72
End: 2022-12-22

## 2022-12-22 VITALS
HEART RATE: 83 BPM | TEMPERATURE: 97.1 F | BODY MASS INDEX: 30.51 KG/M2 | DIASTOLIC BLOOD PRESSURE: 84 MMHG | WEIGHT: 206 LBS | OXYGEN SATURATION: 96 % | RESPIRATION RATE: 18 BRPM | HEIGHT: 69 IN | SYSTOLIC BLOOD PRESSURE: 122 MMHG

## 2022-12-22 DIAGNOSIS — M25.562 ACUTE PAIN OF LEFT KNEE: Primary | ICD-10-CM

## 2022-12-22 PROCEDURE — 99214 OFFICE O/P EST MOD 30 MIN: CPT | Performed by: FAMILY MEDICINE

## 2022-12-22 PROCEDURE — 20610 DRAIN/INJ JOINT/BURSA W/O US: CPT | Performed by: FAMILY MEDICINE

## 2022-12-22 RX ORDER — TRIAMCINOLONE ACETONIDE 40 MG/ML
40 INJECTION, SUSPENSION INTRA-ARTICULAR; INTRAMUSCULAR ONCE
Status: COMPLETED | OUTPATIENT
Start: 2022-12-22 | End: 2022-12-22

## 2022-12-22 RX ADMIN — TRIAMCINOLONE ACETONIDE 40 MG: 40 INJECTION, SUSPENSION INTRA-ARTICULAR; INTRAMUSCULAR at 12:28

## 2022-12-22 NOTE — PROGRESS NOTES
"Chief Complaint  Chief Complaint   Patient presents with   • Knee Injury     Pt c/o L knee pain x 2 weeks      Patient presents to the office for left knee pain.    Subjective    History of Present Illness        Juan C Kumar presents to [facility] for   History of Present Illness  Mr. Kumar states that 2 weeks ago while standing he twisted to the left, his shoulders went left but his foot stayed in the position. He reports that he heard a slight pop on the left side of his knee. The patient states that he thought his knee would get better but after 2 weeks he still has pain. The patient notes that the pain is not that bad and does take Advil or Aleve, and he reports that the medication helps with the pain. The patient is limping and states it hurts. The patient states that his left knee does not hurt when sitting down but hurts when standing up he feel pain. He states that he notices the pain while walking his dog and going up and down the stairs. The patient states that he does not think giving his knee time to heal will help, and had agreed to getting a referral to a orthopedic physician. The patient has also agreed to get a knee injection to help with the pain.        Objective   Vital Signs:   Visit Vitals  /84   Pulse 83   Temp 97.1 °F (36.2 °C)   Resp 18   Ht 175.3 cm (69\")   Wt 93.4 kg (206 lb)   SpO2 96%   BMI 30.42 kg/m²          Physical Exam  Vitals reviewed.   Constitutional:       Appearance: He is well-developed.   HENT:      Head: Normocephalic and atraumatic.      Nose: Nose normal.   Eyes:      General: Lids are normal.      Conjunctiva/sclera: Conjunctivae normal.      Pupils: Pupils are equal, round, and reactive to light.   Cardiovascular:      Rate and Rhythm: Normal rate and regular rhythm.      Pulses: Normal pulses.      Heart sounds: Normal heart sounds.   Pulmonary:      Effort: Pulmonary effort is normal. No respiratory distress.      Breath sounds: " Normal breath sounds.   Abdominal:      General: Bowel sounds are normal.      Palpations: Abdomen is soft.   Musculoskeletal:      Cervical back: Normal range of motion and neck supple.      Left knee: Swelling present. Tenderness present over the medial joint line.   Skin:     General: Skin is warm and dry.      Capillary Refill: Capillary refill takes less than 2 seconds.   Neurological:      Mental Status: He is alert and oriented to person, place, and time.   Psychiatric:         Behavior: Behavior normal.         Thought Content: Thought content normal.         Judgment: Judgment normal.              Result Review :                   Assessment and Plan      Diagnoses and all orders for this visit:    1. Acute pain of left knee (Primary)  Assessment & Plan:  - Patient will get a x-ray.  - Patient will get a referral to a orthopedic physician.  - The patient received triamcinolone acetonide injection 40 mg.   - See procedure not for further evaluation and treatment.    Orders:  -     triamcinolone acetonide (KENALOG-40) injection 40 mg  -     XR Knee 4+ View Left           Follow Up   No follow-ups on file.  Patient was given instructions and counseling regarding his condition or for health maintenance advice. Please see specific information pulled into the AVS if appropriate.     Transcribed from ambient dictation for Carlo Rodriguez Sr, MD by Yanet Cesar.  12/22/22   14:20 EST    Patient or patient representative verbalized consent to the visit recording.  I have personally performed the services described in this document as transcribed by the above individual, and it is both accurate and complete.

## 2022-12-22 NOTE — PROGRESS NOTES
"Knee Injection Procedure Note    Left knee injection was discussed with the patient in detail, including indication, risks, benefits, and alternatives. Verbal consent was given for the procedure. Injection was performed by physician.  Injection site was identified by physical examination and cleaned with alcohol swabs. Prior to needle insertion, ethyl chloride spray was used for surface anesthesia. Sterile technique was used.  A 25-gauge, 1.5\" needle was directed to the joint from a(n) medial approach. Injectate was passed into the joint space without difficulty. The needle was removed and a simple bandage was applied. The procedure was tolerated well without difficulty.    Injection mixture:  1% lidocaine without epinephrine: 2 mL  40 mg/mL Kenalo mL      "

## 2022-12-22 NOTE — ASSESSMENT & PLAN NOTE
- Patient will get a x-ray.  - Patient will get a referral to a orthopedic physician.  - The patient received triamcinolone acetonide injection 40 mg.   - See procedure not for further evaluation and treatment.

## 2022-12-28 ENCOUNTER — TELEPHONE (OUTPATIENT)
Dept: FAMILY MEDICINE CLINIC | Facility: CLINIC | Age: 72
End: 2022-12-28

## 2022-12-28 NOTE — TELEPHONE ENCOUNTER
Caller: Juan C Kumar    Relationship to patient: Self    Best call back number: [phone]    Patient is needing: PATIENT IS CALLING TO STATE THE INJECTION IS NOT HELPING WITH THE LEFT KNEE PAIN.  HE IS WANTING TO KNOW WHAT HE NEEDS TO DO TO GET A REFERRAL TO AN ORTHO.  DOES NEED TO COME BACK IN TO SEE A DRMoises, GET AN X-RAY OR AN MRI?    PLEASE ADVISE.

## 2022-12-29 ENCOUNTER — TELEPHONE (OUTPATIENT)
Dept: FAMILY MEDICINE CLINIC | Facility: CLINIC | Age: 72
End: 2022-12-29

## 2022-12-29 DIAGNOSIS — M25.569 KNEE PAIN, UNSPECIFIED CHRONICITY, UNSPECIFIED LATERALITY: Primary | ICD-10-CM

## 2023-01-17 ENCOUNTER — TELEPHONE (OUTPATIENT)
Dept: FAMILY MEDICINE CLINIC | Facility: CLINIC | Age: 73
End: 2023-01-17
Payer: MEDICARE

## 2023-01-17 DIAGNOSIS — I10 ESSENTIAL HYPERTENSION: ICD-10-CM

## 2023-01-17 DIAGNOSIS — R73.03 PREDIABETES: Primary | ICD-10-CM

## 2023-01-17 DIAGNOSIS — Z00.00 HEALTH CARE MAINTENANCE: ICD-10-CM

## 2023-01-17 DIAGNOSIS — E78.2 MIXED HYPERLIPIDEMIA: ICD-10-CM

## 2023-01-27 ENCOUNTER — OFFICE VISIT (OUTPATIENT)
Dept: ORTHOPEDIC SURGERY | Facility: CLINIC | Age: 73
End: 2023-01-27
Payer: MEDICARE

## 2023-01-27 VITALS — BODY MASS INDEX: 30.81 KG/M2 | HEIGHT: 69 IN | WEIGHT: 208 LBS | TEMPERATURE: 97.6 F

## 2023-01-27 DIAGNOSIS — R52 PAIN: Primary | ICD-10-CM

## 2023-01-27 DIAGNOSIS — M25.562 LEFT KNEE PAIN, UNSPECIFIED CHRONICITY: ICD-10-CM

## 2023-01-27 PROCEDURE — 99203 OFFICE O/P NEW LOW 30 MIN: CPT | Performed by: ORTHOPAEDIC SURGERY

## 2023-01-27 PROCEDURE — 73562 X-RAY EXAM OF KNEE 3: CPT | Performed by: ORTHOPAEDIC SURGERY

## 2023-01-27 NOTE — PROGRESS NOTES
Patient: Juan C Kumar    YOB: 1950    Medical Record Number: [id number]    Chief Complaints:  Left knee pain    History of Present Illness:     72 y.o. male patient who presents for evaluation of the left knee.  The symptoms began around Benjy.  He recalls an incident where he turned and felt something pop in his knee.  Ever since then, he reports the intermittent sensation that something is slipping and catching.  When this happens he has a sharp pain that shoots through the knee.  He especially notices this symptom with certain twisting and pivoting movements.  Denies any pain at present. He reports associated clicking.  Denies having noticed any alleviating factors.  Denies other injuries or complaints.      Allergies: No Known Allergies    Home Medications    Current Outpatient Medications:   •  aspirin 81 MG EC tablet, Take 81 mg by mouth daily., Disp: , Rfl:   •  atorvastatin (LIPITOR) 40 MG tablet, TAKE ONE TABLET BY MOUTH EVERY EVENING, Disp: 90 tablet, Rfl: 3  •  azelastine (ASTELIN) 0.1 % nasal spray, 2 sprays into the nostril(s) as directed by provider 2 (Two) Times a Day. As needed for nasal allergies, Disp: 1 each, Rfl: 12  •  B Complex-C (SUPER B COMPLEX/VITAMIN C) tablet, Take  by mouth., Disp: , Rfl:   •  buPROPion SR (WELLBUTRIN SR) 150 MG 12 hr tablet, TAKE ONE TABLET BY MOUTH DAILY FOR DEPRESSION, Disp: 90 tablet, Rfl: 1  •  Cholecalciferol (VITAMIN D3) 2000 units capsule, Two OTC caps  QDay to supplement Vitamin D, Disp: 60 capsule, Rfl: prn  •  Claritin-D 12 Hour 5-120 MG per 12 hr tablet, TAKE ONE TABLET BY MOUTH TWICE A DAY -- DO NOT TAKE IF SYSTOLIC BLOOD PRESSURE IS GREATER THAN 140, Disp: 30 tablet, Rfl: 3  •  coenzyme Q10 100 MG capsule, Take 100 mg by mouth daily., Disp: , Rfl:   •  esomeprazole (nexIUM) 20 MG capsule, Take 20 mg by mouth Every Morning Before Breakfast., Disp: , Rfl:   •  fluticasone (FLONASE) 50 MCG/ACT nasal spray, SPRAY TWO SPRAYS IN EACH NOSTRIL  ONCE DAILY, Disp: 16 mL, Rfl: 5  •  ipratropium (ATROVENT) 0.06 % nasal spray, 2 sprays into the nostril(s) as directed by provider 4 (Four) Times a Day. 2 sprays each nostril every night at bedtime, Disp: 15 mL, Rfl: 5  •  losartan (COZAAR) 25 MG tablet, TAKE ONE TABLET BY MOUTH DAILY FOR BLOOD PRESSURE, Disp: 90 tablet, Rfl: 1  •  Omega-3 Fatty Acids (FISH OIL) 1200 MG capsule capsule, Take 1,200 mg by mouth Daily., Disp: , Rfl:   •  Semaglutide, 1 MG/DOSE, 4 MG/3ML solution pen-injector, Inject 1 mg under the skin into the appropriate area as directed 1 (One) Time Per Week., Disp: 9 mL, Rfl: 1  •  sildenafil (VIAGRA) 100 MG tablet, TAKE ONE-HALF TO ONE TABLET BY MOUTH 1 HOUR PRIOR TO INTIMACY AS NEEDED DAILY, Disp: 9 tablet, Rfl: 5  •  amoxicillin-clavulanate (Augmentin) 875-125 MG per tablet, Take 1 tablet by mouth 2 (Two) Times a Day. With food and water, Disp: 20 tablet, Rfl: 0  •  cefdinir (OMNICEF) 300 MG capsule, Take 1 capsule by mouth 2 (Two) Times a Day., Disp: 20 capsule, Rfl: 0    Past Medical History:   Diagnosis Date   • Allergic    • Arthritis    • Back pain    • Basal cell carcinoma    • Colon polyps    • Hyperlipidemia    • Numbness and tingling in hands     and legs   • Numbness and tingling of left leg        Past Surgical History:   Procedure Laterality Date   • COLONOSCOPY     • TONSILLECTOMY         Social History     Occupational History   • Not on file   Tobacco Use   • Smoking status: Former   • Smokeless tobacco: Never   Substance and Sexual Activity   • Alcohol use: Yes     Comment: social   • Drug use: No   • Sexual activity: Defer      Social History     Social History Narrative   • Not on file       Family History   Problem Relation Age of Onset   • Hyperlipidemia Mother    • Cancer Mother    • Hyperlipidemia Father    • Cancer Father    • Cancer Brother    • Melanoma Brother        Review of Systems:      Constitutional: Denies fever, shaking or chills   Eyes: Denies change in visual  "acuity   HEENT: Denies nasal congestion or sore throat   Respiratory: Denies cough or shortness of breath   Cardiovascular: Denies chest pain or edema  Endocrine: Denies tremors, palpitations, intolerance of heat or cold, polyuria, polydipsia.  GI: Denies abdominal pain, nausea, vomiting, bloody stools or diarrhea  : Denies frequency, urgency, incontinence, retention, or nocturia.  Musculoskeletal: Denies numbness, tingling or loss of motor function except as above  Integument: Denies rash, lesion or ulceration   Neurologic: Denies headache or focal weakness, deficits  Heme: Denies spontaneous or excessive bleeding, epistaxis, hematuria, melena, fatigue, enlarged or tender lymph nodes.      All other pertinent positives and negatives as noted above in HPI.    Physical Exam:   72 y.o. male  Vitals:    01/27/23 1555   Temp: 97.6 °F (36.4 °C)   Weight: 94.3 kg (208 lb)   Height: 175.3 cm (69\")     General:  Patient is awake and alert.  Appears in no acute distress or discomfort.    Psych:  Affect and demeanor are appropriate.    Cardiovascular:  Regular rate and rhythm.    Lungs:  Good chest expansion.  Breathing unlabored.    Extremities:  Left knee is examined.  Skin is benign.  No obvious gross abnormalities.  No palpable masses or adenopathy.  Mild tenderness noted over anteromedial joint line.  Motion is full and symmetric to the contralateral side.  Mild discomfort with hyperflexion.  Negative medial Arun's.  Lateral Arun's reproduces anteromedial pain.  No instability.  Strength is well preserved including hip flexion, knee extension, ankle and toe plantarflexion, ankle inversion and eversion.  Good sensory function throughout the leg and foot.  Palpable pulses.  Brisk capillary refill.  Good skin turgor.         Radiology:   Bilateral standing AP views, bilateral merchants views and a lateral view of the left knee are ordered by myself and reviewed to evaluate the patient's complaint.  No comparison " films are immediately available.  The x-rays show mild to moderate patellofemoral arthritis.  It appears that he has some loose bodies in the back of the knee.  Otherwise, there are no acute abnormalities, lesions, masses, malalignment or other concerning findings.    Assessment/Plan:  Left knee pain, possible meniscal tear    We discussed treatment options in detail including the risks, benefits, and alternatives of conservative treatment versus pursuing further work-up with an eye towards possible surgery.  He wants to pursue further work-up.  Have agreed to refer him for an MRI.  I told him I will call him when I see the results.    Frank Bland MD    01/27/2023    CC to Epley, James, APRN

## 2023-02-07 ENCOUNTER — HOSPITAL ENCOUNTER (OUTPATIENT)
Dept: MRI IMAGING | Facility: HOSPITAL | Age: 73
Discharge: HOME | End: 2023-02-07
Admitting: ORTHOPAEDIC SURGERY
Payer: MEDICARE

## 2023-02-07 DIAGNOSIS — M25.562 LEFT KNEE PAIN, UNSPECIFIED CHRONICITY: ICD-10-CM

## 2023-02-07 PROCEDURE — 73721 MRI JNT OF LWR EXTRE W/O DYE: CPT

## 2023-02-08 ENCOUNTER — TELEPHONE (OUTPATIENT)
Dept: ORTHOPEDIC SURGERY | Facility: CLINIC | Age: 73
End: 2023-02-08
Payer: MEDICARE

## 2023-02-08 NOTE — TELEPHONE ENCOUNTER
I spoke to Mr. Kumar.  I provided him with the left knee MRI results.    He has a complex medial meniscal tear.  He has moderate tricompartment osteoarthritis with full-thickness articular cartilage loss in the medial compartment.  We discussed both conservative and surgical options.  He would like to consider having a repeat injection.  Reports he had a cortisone injection by another provider on 12/22/22. I explained he is not eligible for a repeat injection until 90 days after his last.  He is considering a gel injection.  He will follow up later this month by telephone to let me know what he has decided.

## 2023-02-21 ENCOUNTER — TELEPHONE (OUTPATIENT)
Dept: ORTHOPEDIC SURGERY | Facility: CLINIC | Age: 73
End: 2023-02-21
Payer: MEDICARE

## 2023-02-21 ENCOUNTER — TELEPHONE (OUTPATIENT)
Dept: ORTHOPEDIC SURGERY | Facility: HOSPITAL | Age: 73
End: 2023-02-21
Payer: MEDICARE

## 2023-02-21 DIAGNOSIS — M25.562 LEFT KNEE PAIN, UNSPECIFIED CHRONICITY: Primary | ICD-10-CM

## 2023-02-21 RX ORDER — LORATADINE AND PSEUDOEPHEDRINE SULFATE 5; 120 MG/1; MG/1
1 TABLET, EXTENDED RELEASE ORAL DAILY
Qty: 30 TABLET | Refills: 3 | Status: SHIPPED | OUTPATIENT
Start: 2023-02-21

## 2023-02-21 NOTE — TELEPHONE ENCOUNTER
Caller: Juan C Kumar    Relationship: Self    Best call back number: [phone]    Requested Prescriptions:   Requested Prescriptions     Pending Prescriptions Disp Refills   • loratadine-pseudoephedrine (Claritin-D 12 Hour) 5-120 MG per 12 hr tablet 30 tablet 3        Pharmacy where request should be sent: [facility] [id number] AT [facility].  [phone]  - [phone] FX     Additional details provided by patient: PATIENT HAS 3-4 TABLETS LEFT OF MEDICATION     Does the patient have less than a 3 day supply:  [] Yes  [x] No    Would you like a call back once the refill request has been completed: [] Yes [x] No    If the office needs to give you a call back, can they leave a voicemail: [] Yes [x] No    Jaylyn Reyes Rep   02/21/23 10:21 EST

## 2023-02-21 NOTE — TELEPHONE ENCOUNTER
PATIENT WAS TOLD BY [facility] TO CALL BACK ON 2/22/23 TO GET ORDER IN FOR GEL INJ FOR MARCH 27TH APPT     HIS LAST INJ WAS 12/22/22 (JASON) IN THE LT KNEE- BUT NOT IN OUR OFFICE.    HE IS SCHED FOR 3/27 W/ [facility] FOR POSS JASON/GEL INJ.     PLEASE ADVISE.

## 2023-02-21 NOTE — TELEPHONE ENCOUNTER
I spoke with him.  All questions answered.  I am going to have the office contact him to set up an appointment.

## 2023-02-21 NOTE — TELEPHONE ENCOUNTER
Rx Refill Note  Requested Prescriptions     Pending Prescriptions Disp Refills   • loratadine-pseudoephedrine (Claritin-D 12 Hour) 5-120 MG per 12 hr tablet 30 tablet 3      Last office visit with prescribing clinician: 10/19/2022   Last telemedicine visit with prescribing clinician: 3/14/2023   Next office visit with prescribing clinician: 3/14/2023                         Would you like a call back once the refill request has been completed: [] Yes [] No    If the office needs to give you a call back, can they leave a voicemail: [] Yes [] No    Jaylyn Kellogg Rep  02/21/23, 15:52 EST

## 2023-02-28 ENCOUNTER — PREP FOR SURGERY (OUTPATIENT)
Dept: OTHER | Facility: HOSPITAL | Age: 73
End: 2023-02-28
Payer: MEDICARE

## 2023-02-28 DIAGNOSIS — M25.562 LEFT KNEE PAIN, UNSPECIFIED CHRONICITY: Primary | ICD-10-CM

## 2023-02-28 PROBLEM — M17.12 OSTEOARTHRITIS OF LEFT KNEE: Status: ACTIVE | Noted: 2023-02-28

## 2023-02-28 RX ORDER — MELOXICAM 15 MG/1
15 TABLET ORAL ONCE
OUTPATIENT
Start: 2023-02-28 | End: 2023-02-28

## 2023-02-28 RX ORDER — VANCOMYCIN HYDROCHLORIDE 1 G/200ML
1000 INJECTION, SOLUTION INTRAVENOUS ONCE
OUTPATIENT
Start: 2023-02-28 | End: 2023-02-28

## 2023-02-28 RX ORDER — PREGABALIN 75 MG/1
150 CAPSULE ORAL ONCE
OUTPATIENT
Start: 2023-02-28 | End: 2023-02-28

## 2023-02-28 RX ORDER — ACETAMINOPHEN 500 MG
1000 TABLET ORAL ONCE
OUTPATIENT
Start: 2023-02-28 | End: 2023-02-28

## 2023-02-28 RX ORDER — CEFAZOLIN SODIUM 2 G/100ML
2 INJECTION, SOLUTION INTRAVENOUS ONCE
OUTPATIENT
Start: 2023-02-28 | End: 2023-02-28

## 2023-02-28 RX ORDER — CHLORHEXIDINE GLUCONATE 500 MG/1
1 CLOTH TOPICAL TAKE AS DIRECTED
OUTPATIENT
Start: 2023-02-28

## 2023-03-07 ENCOUNTER — TELEPHONE (OUTPATIENT)
Dept: ORTHOPEDIC SURGERY | Facility: CLINIC | Age: 73
End: 2023-03-07

## 2023-03-07 NOTE — TELEPHONE ENCOUNTER
"    Caller: Juan C Kumar \"Juan C Kumar\"    Relationship to patient: Self    Best call back number: [phone]    Chief complaint: LT KNEE    Type of visit: SURGERY    Requested date: ASAP    Additional notes: PT STATING HAS BEEN WAITING ABOUT 2 WEEKS FOR [facility] . UNABLE TO WARM TRANSFER. PLEASE FOLLOW UP WITH PT ASAP.  "

## 2023-03-09 ENCOUNTER — LAB (OUTPATIENT)
Dept: LAB | Facility: HOSPITAL | Age: 73
End: 2023-03-09
Payer: MEDICARE

## 2023-03-09 PROCEDURE — 83036 HEMOGLOBIN GLYCOSYLATED A1C: CPT | Performed by: NURSE PRACTITIONER

## 2023-03-09 PROCEDURE — 80053 COMPREHEN METABOLIC PANEL: CPT | Performed by: NURSE PRACTITIONER

## 2023-03-09 PROCEDURE — 80061 LIPID PANEL: CPT | Performed by: NURSE PRACTITIONER

## 2023-03-09 PROCEDURE — 81001 URINALYSIS AUTO W/SCOPE: CPT | Performed by: NURSE PRACTITIONER

## 2023-03-09 PROCEDURE — 85025 COMPLETE CBC W/AUTO DIFF WBC: CPT | Performed by: NURSE PRACTITIONER

## 2023-03-14 ENCOUNTER — OFFICE VISIT (OUTPATIENT)
Dept: FAMILY MEDICINE CLINIC | Facility: CLINIC | Age: 73
End: 2023-03-14
Payer: MEDICARE

## 2023-03-14 ENCOUNTER — TELEPHONE (OUTPATIENT)
Dept: FAMILY MEDICINE CLINIC | Facility: CLINIC | Age: 73
End: 2023-03-14

## 2023-03-14 VITALS
HEIGHT: 69 IN | WEIGHT: 208.4 LBS | RESPIRATION RATE: 14 BRPM | DIASTOLIC BLOOD PRESSURE: 88 MMHG | HEART RATE: 92 BPM | TEMPERATURE: 97 F | OXYGEN SATURATION: 98 % | BODY MASS INDEX: 30.87 KG/M2 | SYSTOLIC BLOOD PRESSURE: 138 MMHG

## 2023-03-14 DIAGNOSIS — R73.03 PREDIABETES: ICD-10-CM

## 2023-03-14 DIAGNOSIS — E78.2 MIXED HYPERLIPIDEMIA: ICD-10-CM

## 2023-03-14 DIAGNOSIS — I10 MILD ESSENTIAL HYPERTENSION: ICD-10-CM

## 2023-03-14 DIAGNOSIS — Z00.00 MEDICARE ANNUAL WELLNESS VISIT, SUBSEQUENT: Primary | ICD-10-CM

## 2023-03-14 DIAGNOSIS — Z12.11 SCREEN FOR COLON CANCER: ICD-10-CM

## 2023-03-14 PROCEDURE — 1170F FXNL STATUS ASSESSED: CPT | Performed by: NURSE PRACTITIONER

## 2023-03-14 PROCEDURE — 3075F SYST BP GE 130 - 139MM HG: CPT | Performed by: NURSE PRACTITIONER

## 2023-03-14 PROCEDURE — 99213 OFFICE O/P EST LOW 20 MIN: CPT | Performed by: NURSE PRACTITIONER

## 2023-03-14 PROCEDURE — 3079F DIAST BP 80-89 MM HG: CPT | Performed by: NURSE PRACTITIONER

## 2023-03-14 PROCEDURE — G0439 PPPS, SUBSEQ VISIT: HCPCS | Performed by: NURSE PRACTITIONER

## 2023-03-14 PROCEDURE — 93000 ELECTROCARDIOGRAM COMPLETE: CPT | Performed by: NURSE PRACTITIONER

## 2023-03-14 RX ORDER — SEMAGLUTIDE 2.68 MG/ML
2 INJECTION, SOLUTION SUBCUTANEOUS WEEKLY
Qty: 9 ML | Refills: 1 | Status: SHIPPED | OUTPATIENT
Start: 2023-03-14

## 2023-03-14 NOTE — PATIENT INSTRUCTIONS
Discharge instructions continue healthy diet and exercise increase Ozempic up to 2 mg and see how you do with this dose given 6 weeks or so  Before deciding benefit risk    Steady as she goes continue decrease breads pastas and sweets get the low hanging fruit in your diet and be consistent  64 ounces of water daily always hydrate well    Follow-up labs in 6 months before your next appointment.    Consider CT calcium score of the coronary arteries  Screening as well ask  At some point carotid aorta and lower extremities as well.

## 2023-03-14 NOTE — PROGRESS NOTES
The ABCs of the Annual Wellness Visit  Subsequent Medicare Wellness Visit    Subjective    Juan C Kumar is a 72 y.o. male who presents for a Subsequent Medicare Wellness Visit.    The following portions of the patient's history were reviewed and   updated as appropriate: allergies, current medications, past family history, past medical history, past social history, past surgical history and problem list.    Compared to one year ago, the patient feels his physical   health is the same.    Compared to one year ago, the patient feels his mental   health is the same.    Recent Hospitalizations:  He was not admitted to the hospital during the last year.       Current Medical Providers:  Patient Care Team:  Epley, James, APRN as PCP - General (Family Medicine)  Tay Orozco MD as Surgeon (Hand Surgery)    Outpatient Medications Prior to Visit   Medication Sig Dispense Refill   • aspirin 81 MG EC tablet Take 1 tablet by mouth Every Other Day.     • atorvastatin (LIPITOR) 40 MG tablet TAKE ONE TABLET BY MOUTH EVERY EVENING 90 tablet 3   • azelastine (ASTELIN) 0.1 % nasal spray 2 sprays into the nostril(s) as directed by provider 2 (Two) Times a Day. As needed for nasal allergies 1 each 12   • B Complex-C (SUPER B COMPLEX/VITAMIN C) tablet Take  by mouth.     • buPROPion SR (WELLBUTRIN SR) 150 MG 12 hr tablet TAKE ONE TABLET BY MOUTH DAILY FOR DEPRESSION 90 tablet 1   • Cholecalciferol (VITAMIN D3) 2000 units capsule Two OTC caps  QDay to supplement Vitamin D 60 capsule prn   • coenzyme Q10 100 MG capsule Take 1 capsule by mouth Daily.     • esomeprazole (nexIUM) 20 MG capsule Take 1 capsule by mouth Every Morning Before Breakfast.     • fluticasone (FLONASE) 50 MCG/ACT nasal spray SPRAY TWO SPRAYS IN EACH NOSTRIL ONCE DAILY 16 mL 5   • ipratropium (ATROVENT) 0.06 % nasal spray 2 sprays into the nostril(s) as directed by provider 4 (Four) Times a Day. 2 sprays each nostril every night at bedtime 15 mL 5   •  loratadine-pseudoephedrine (Claritin-D 12 Hour) 5-120 MG per 12 hr tablet Take 1 tablet by mouth Daily. As needed 30 tablet 3   • losartan (COZAAR) 25 MG tablet TAKE ONE TABLET BY MOUTH DAILY FOR BLOOD PRESSURE 90 tablet 1   • Omega-3 Fatty Acids (FISH OIL) 1200 MG capsule capsule Take 1 capsule by mouth Daily.     • sildenafil (VIAGRA) 100 MG tablet TAKE ONE-HALF TO ONE TABLET BY MOUTH 1 HOUR PRIOR TO INTIMACY AS NEEDED DAILY 9 tablet 5   • Semaglutide, 1 MG/DOSE, 4 MG/3ML solution pen-injector Inject 1 mg under the skin into the appropriate area as directed 1 (One) Time Per Week. 9 mL 1   • amoxicillin-clavulanate (Augmentin) 875-125 MG per tablet Take 1 tablet by mouth 2 (Two) Times a Day. With food and water 20 tablet 0   • cefdinir (OMNICEF) 300 MG capsule Take 1 capsule by mouth 2 (Two) Times a Day. 20 capsule 0     No facility-administered medications prior to visit.       No opioid medication identified on active medication list. I have reviewed chart for other potential  high risk medication/s and harmful drug interactions in the elderly.          Aspirin is on active medication list. Aspirin use is not indicated based on review of current medical condition/s. Risk of harm outweighs potential benefits. Patient instructed to discontinue this medication.  .      Patient Active Problem List   Diagnosis   • Hyperlipidemia   • Gastroesophageal reflux disease   • Osteoarthritis of both hands   • Health care maintenance   • Prostate cancer screening   • Left-sided low back pain with left-sided sciatica   • IFG (impaired fasting glucose)   • Acute recurrent maxillary sinusitis   • Colon cancer screening   • ]   • Prediabetes   • Depression   • Grief   • Mild major depression (HCC)   • Elevated blood pressure reading without diagnosis of hypertension   • Erectile dysfunction   • Acute pain of left knee   • Osteoarthritis of left knee   • Mild essential hypertension     Advance Care Planning  Advance Directive is on  "file.  ACP discussion was held with the patient during this visit. Patient has an advance directive in EMR which is still valid.      Objective    Vitals:    03/14/23 1506   BP: 138/88   Pulse: 92   Resp: 14   Temp: 97 °F (36.1 °C)   TempSrc: Infrared   SpO2: 98%   Weight: 94.5 kg (208 lb 6.4 oz)   Height: 175.3 cm (69\")   PainSc: 0-No pain     Estimated body mass index is 30.78 kg/m² as calculated from the following:    Height as of this encounter: 175.3 cm (69\").    Weight as of this encounter: 94.5 kg (208 lb 6.4 oz).    BMI is >= 30 and <35. (Class 1 Obesity). The following options were offered after discussion;: exercise counseling/recommendations and nutrition counseling/recommendations      Does the patient have evidence of cognitive impairment? No    Lab Results   Component Value Date    TRIG 85 03/09/2023    HDL 45 03/09/2023    LDL 73 03/09/2023    VLDL 16 03/09/2023    HGBA1C 6.30 (H) 03/09/2023        HEALTH RISK ASSESSMENT    Smoking Status:  Social History     Tobacco Use   Smoking Status Former   Smokeless Tobacco Never     Alcohol Consumption:  Social History     Substance and Sexual Activity   Alcohol Use Yes    Comment: social     Fall Risk Screen:    STEADI Fall Risk Assessment was completed, and patient is at LOW risk for falls.Assessment completed on:3/14/2023    Depression Screening:  PHQ-2/PHQ-9 Depression Screening 3/14/2023   Little Interest or Pleasure in Doing Things 0-->not at all   Feeling Down, Depressed or Hopeless 0-->not at all   PHQ-9: Brief Depression Severity Measure Score 0       Health Habits and Functional and Cognitive Screening:  Functional & Cognitive Status 3/14/2023   Do you have difficulty preparing food and eating? No   Do you have difficulty bathing yourself, getting dressed or grooming yourself? No   Do you have difficulty using the toilet? No   Do you have difficulty moving around from place to place? No   Do you have trouble with steps or getting out of a bed or a " chair? No   Current Diet Well Balanced Diet        Current Diet Comment -   Dental Exam Up to date   Eye Exam Up to date   Exercise (times per week) 7 times per week   Current Exercises Include Walking        Exercise Comment -   Current Exercise Activities Include -   Do you need help using the phone?  No   Are you deaf or do you have serious difficulty hearing?  No   Do you need help with transportation? No   Do you need help shopping? No   Do you need help preparing meals?  No   Do you need help with housework?  No   Do you need help with laundry? No   Do you need help taking your medications? No   Do you need help managing money? No   Do you ever drive or ride in a car without wearing a seat belt? No   Have you felt unusual stress, anger or loneliness in the last month? No   Who do you live with? Spouse   If you need help, do you have trouble finding someone available to you? No   Have you been bothered in the last four weeks by sexual problems? No   Do you have difficulty concentrating, remembering or making decisions? No       Age-appropriate Screening Schedule:  Refer to the list below for future screening recommendations based on patient's age, sex and/or medical conditions. Orders for these recommended tests are listed in the plan section. The patient has been provided with a written plan.    Health Maintenance   Topic Date Due   • AAA SCREEN (ONE-TIME)  Never done   • ANNUAL WELLNESS VISIT  07/28/2022   • COLORECTAL CANCER SCREENING  05/08/2023   • PROSTATE CANCER SCREENING  06/16/2023   • LIPID PANEL  03/09/2024   • TDAP/TD VACCINES (3 - Td or Tdap) 06/05/2028   • HEPATITIS C SCREENING  Completed   • COVID-19 Vaccine  Completed   • INFLUENZA VACCINE  Completed   • Pneumococcal Vaccine 65+  Completed   • ZOSTER VACCINE  Completed                CMS Preventative Services Quick Reference  Risk Factors Identified During Encounter  Fall Risk-High or Moderate: Discussed Fall Prevention in the home  The above  risks/problems have been discussed with the patient.  Pertinent information has been shared with the patient in the After Visit Summary.  An After Visit Summary and PPPS were made available to the patient.    Follow Up:   Next Medicare Wellness visit to be scheduled in 1 year.       Additional E&M Note during same encounter follows:  Patient has multiple medical problems which are significant and separately identifiable that require additional work above and beyond the Medicare Wellness Visit.      Chief Complaint  Medicare Wellness-subsequent    Subjective        Very pleasant gentleman here today follow-up Medicare wellness is well as follow-up prediabetes, with obesity, BMI over 30 with comorbidity being prediabetes, mixed hyperlipidemia, essential hypertension checks blood pressure at home but not necessarily routinely but generally runs around 130s sometimes 135  Takes low-dose losartan no chest pain shortness of breath or other issues except unfortunately he injured left knee with meniscus tear and he has substantial arthritis as well  He is having some chronic pain but generally does not bother him too much as long as he is careful when he walks he wears a brace to align his knee properly does have some pain at night he has had some steroid injection which helped and sees Dr. Bland orthopedic but he may be in the replacement candidate depending on how he does next few months.        Juan C LEDESMA Milo is also being seen today for follow-up essential hypertension, mild, controlled at home,  Prediabetes with obesity comorbidity, improving  Mildly with Ozempic, need adjustment.      Review of Systems   Constitutional: Negative.    HENT: Negative.    Eyes: Negative.    Respiratory: Negative.    Cardiovascular: Negative.    Gastrointestinal: Negative.    Musculoskeletal: Negative.    Skin: Negative.    Neurological: Negative.    Psychiatric/Behavioral: Negative.    All other systems reviewed and are  "negative.      Objective   Vital Signs:  /88   Pulse 92   Temp 97 °F (36.1 °C) (Infrared)   Resp 14   Ht 175.3 cm (69\")   Wt 94.5 kg (208 lb 6.4 oz)   SpO2 98%   BMI 30.78 kg/m²     Physical Exam                    Assessment and Plan   Diagnoses and all orders for this visit:    1. Medicare annual wellness visit, subsequent (Primary)    2. Prediabetes    3. Mild essential hypertension  -     ECG 12 Lead    4. Screen for colon cancer  -     Ambulatory Referral For Screening Colonoscopy    5. Mixed hyperlipidemia    Other orders  -     Semaglutide, 2 MG/DOSE, (Ozempic, 2 MG/DOSE,) 8 MG/3ML solution pen-injector; Inject 2 mg under the skin into the appropriate area as directed 1 (One) Time Per Week.  Dispense: 9 mL; Refill: 1             Follow Up   Return in about 6 months (around 9/14/2023).  Patient was given instructions and counseling regarding his condition or for health maintenance advice. Please see specific information pulled into the AVS if appropriate.       Medicare wellness he is up on appropriate screenings referral placed for colonoscopy  Falls precaution avoid heights vegetables vegetables vegetables discussed immunizations    Otherwise prediabetes, with obesity and comorbidities of mixed hyperlipidemia mild hypertension  Increase Ozempic 2 mg give 6 to 8 weeks to see if he sees a change in his eating habits  He already has some mild improvement in A1c  We will see if we get a more robust response at the 2 mg  If not we should consider discontinuing this.    He will check blood pressure weekly still present care hydrate well  Continue statin    Consider CT calcium score as other vascular screenings as well    "

## 2023-03-14 NOTE — TELEPHONE ENCOUNTER
PT STATED HE NEEDS LABS DONE PRIOR TO 6 MONTH FU IN September.     NO CURRENT LAB ORDERS IN. PLEASE ADVISE AND I WILL CALL PT TO SCHEDULE. THANK YOU.

## 2023-03-15 ENCOUNTER — TELEPHONE (OUTPATIENT)
Dept: FAMILY MEDICINE CLINIC | Facility: CLINIC | Age: 73
End: 2023-03-15
Payer: MEDICARE

## 2023-03-15 DIAGNOSIS — I10 MILD ESSENTIAL HYPERTENSION: ICD-10-CM

## 2023-03-15 DIAGNOSIS — R73.03 PREDIABETES: Primary | ICD-10-CM

## 2023-03-15 DIAGNOSIS — E78.2 MIXED HYPERLIPIDEMIA: ICD-10-CM

## 2023-03-15 DIAGNOSIS — Z12.5 PROSTATE CANCER SCREENING: ICD-10-CM

## 2023-03-15 DIAGNOSIS — I10 ESSENTIAL HYPERTENSION: ICD-10-CM

## 2023-03-15 DIAGNOSIS — Z00.00 HEALTH CARE MAINTENANCE: ICD-10-CM

## 2023-03-27 ENCOUNTER — OFFICE VISIT (OUTPATIENT)
Dept: ORTHOPEDIC SURGERY | Facility: CLINIC | Age: 73
End: 2023-03-27
Payer: MEDICARE

## 2023-03-27 VITALS — TEMPERATURE: 97.7 F | WEIGHT: 209 LBS | BODY MASS INDEX: 31.67 KG/M2 | HEIGHT: 68 IN

## 2023-03-27 DIAGNOSIS — M17.10 ARTHRITIS OF KNEE: Primary | ICD-10-CM

## 2023-03-27 RX ORDER — METHYLPREDNISOLONE ACETATE 80 MG/ML
80 INJECTION, SUSPENSION INTRA-ARTICULAR; INTRALESIONAL; INTRAMUSCULAR; SOFT TISSUE
Status: COMPLETED | OUTPATIENT
Start: 2023-03-27 | End: 2023-03-27

## 2023-03-27 RX ORDER — LIDOCAINE HYDROCHLORIDE 10 MG/ML
2 INJECTION, SOLUTION EPIDURAL; INFILTRATION; INTRACAUDAL; PERINEURAL
Status: COMPLETED | OUTPATIENT
Start: 2023-03-27 | End: 2023-03-27

## 2023-03-27 RX ADMIN — METHYLPREDNISOLONE ACETATE 80 MG: 80 INJECTION, SUSPENSION INTRA-ARTICULAR; INTRALESIONAL; INTRAMUSCULAR; SOFT TISSUE at 13:54

## 2023-03-27 RX ADMIN — LIDOCAINE HYDROCHLORIDE 2 ML: 10 INJECTION, SOLUTION EPIDURAL; INFILTRATION; INTRACAUDAL; PERINEURAL at 13:54

## 2023-03-30 RX ORDER — LOSARTAN POTASSIUM 25 MG/1
TABLET ORAL
Qty: 90 TABLET | Refills: 1 | Status: SHIPPED | OUTPATIENT
Start: 2023-03-30

## 2023-04-19 ENCOUNTER — PRE-PROCEDURE SCREENING (OUTPATIENT)
Dept: GASTROENTEROLOGY | Facility: CLINIC | Age: 73
End: 2023-04-19
Payer: MEDICARE

## 2023-04-19 NOTE — TELEPHONE ENCOUNTER
LAST SCOPE 5/18 IN Epic --Personal history of polyps--Family history of polyps --No family history of  colon cancer--ASPIRIN--Medications:            aspirin 81 MG EC tablet  atorvastatin (LIPITOR) 40 MG tablet  azelastine (ASTELIN) 0.1 % nasal spray  B Complex-C (SUPER B COMPLEX/VITAMIN C) tablet    buPROPion SR (WELLBUTRIN SR) 150 MG 12 hr tablet  Cholecalciferol (VITAMIN D3) 2000 units capsule  coenzyme Q10 100 MG capsule  esomeprazole (nexIUM) 20 MG capsule    fluticasone (FLONASE) 50 MCG/ACT nasal spray    ipratropium (ATROVENT) 0.06 % nasal spray    loratadine-pseudoephedrine (Claritin-D 12 Hour) 5-120 MG per 12 hr tablet    losartan (COZAAR) 25 MG tablet  Omega-3 Fatty Acids (FISH OIL) 1200 MG capsule capsule  Semaglutide, 2 MG/DOSE, (Ozempic, 2 MG/DOSE,) 8 MG/3ML solution pen-injector    sildenafil (VIAGRA) 100 MG tablet      QUESTIONNAIRE SCREENING  SCAN IN  MEDIA & HAS BEEN SENT TO DOCTOR FOR REVIEW

## 2023-04-26 ENCOUNTER — PREP FOR SURGERY (OUTPATIENT)
Dept: OTHER | Facility: HOSPITAL | Age: 73
End: 2023-04-26
Payer: MEDICARE

## 2023-04-26 DIAGNOSIS — Z86.010 HISTORY OF ADENOMATOUS POLYP OF COLON: Primary | ICD-10-CM

## 2023-04-28 ENCOUNTER — TELEPHONE (OUTPATIENT)
Dept: GASTROENTEROLOGY | Facility: CLINIC | Age: 73
End: 2023-04-28
Payer: MEDICARE

## 2023-04-28 PROBLEM — Z86.0101 HISTORY OF ADENOMATOUS POLYP OF COLON: Status: ACTIVE | Noted: 2023-04-28

## 2023-04-28 PROBLEM — Z86.010 HISTORY OF ADENOMATOUS POLYP OF COLON: Status: ACTIVE | Noted: 2023-04-28

## 2023-04-28 NOTE — TELEPHONE ENCOUNTER
OK FOR HUB TO READ   PT SALINAS FOR COLONOSCOPY ON 5/5/23 @ 10 AM PT Mailed [facility] PREP BEBETO LOPEZ

## 2023-05-05 ENCOUNTER — ANESTHESIA EVENT (OUTPATIENT)
Dept: GASTROENTEROLOGY | Facility: HOSPITAL | Age: 73
End: 2023-05-05
Payer: MEDICARE

## 2023-05-05 ENCOUNTER — HOSPITAL ENCOUNTER (OUTPATIENT)
Facility: HOSPITAL | Age: 73
Setting detail: HOSPITAL OUTPATIENT SURGERY
Discharge: HOME | End: 2023-05-05
Attending: INTERNAL MEDICINE | Admitting: INTERNAL MEDICINE
Payer: MEDICARE

## 2023-05-05 ENCOUNTER — ANESTHESIA (OUTPATIENT)
Dept: GASTROENTEROLOGY | Facility: HOSPITAL | Age: 73
End: 2023-05-05
Payer: MEDICARE

## 2023-05-05 VITALS
HEIGHT: 68 IN | OXYGEN SATURATION: 99 % | DIASTOLIC BLOOD PRESSURE: 84 MMHG | HEART RATE: 65 BPM | BODY MASS INDEX: 30.16 KG/M2 | RESPIRATION RATE: 18 BRPM | WEIGHT: 199 LBS | SYSTOLIC BLOOD PRESSURE: 117 MMHG

## 2023-05-05 DIAGNOSIS — Z86.010 HISTORY OF ADENOMATOUS POLYP OF COLON: ICD-10-CM

## 2023-05-05 PROCEDURE — 25010000002 PROPOFOL 10 MG/ML EMULSION: Performed by: REGISTERED NURSE

## 2023-05-05 PROCEDURE — 0 LIDOCAINE 1 % SOLUTION: Performed by: REGISTERED NURSE

## 2023-05-05 PROCEDURE — 88305 TISSUE EXAM BY PATHOLOGIST: CPT | Performed by: INTERNAL MEDICINE

## 2023-05-05 PROCEDURE — 45385 COLONOSCOPY W/LESION REMOVAL: CPT | Performed by: INTERNAL MEDICINE

## 2023-05-05 PROCEDURE — S0260 H&P FOR SURGERY: HCPCS | Performed by: INTERNAL MEDICINE

## 2023-05-05 RX ORDER — LIDOCAINE HYDROCHLORIDE 10 MG/ML
0.5 INJECTION, SOLUTION INFILTRATION; PERINEURAL ONCE AS NEEDED
Status: DISCONTINUED | OUTPATIENT
Start: 2023-05-05 | End: 2023-05-05 | Stop reason: HOSPADM

## 2023-05-05 RX ORDER — ONDANSETRON 2 MG/ML
4 INJECTION INTRAMUSCULAR; INTRAVENOUS ONCE AS NEEDED
Status: DISCONTINUED | OUTPATIENT
Start: 2023-05-05 | End: 2023-05-05 | Stop reason: HOSPADM

## 2023-05-05 RX ORDER — SODIUM CHLORIDE 0.9 % (FLUSH) 0.9 %
10 SYRINGE (ML) INJECTION AS NEEDED
Status: DISCONTINUED | OUTPATIENT
Start: 2023-05-05 | End: 2023-05-05 | Stop reason: HOSPADM

## 2023-05-05 RX ORDER — PROPOFOL 10 MG/ML
VIAL (ML) INTRAVENOUS AS NEEDED
Status: DISCONTINUED | OUTPATIENT
Start: 2023-05-05 | End: 2023-05-05 | Stop reason: SURG

## 2023-05-05 RX ORDER — SODIUM CHLORIDE, SODIUM LACTATE, POTASSIUM CHLORIDE, CALCIUM CHLORIDE 600; 310; 30; 20 MG/100ML; MG/100ML; MG/100ML; MG/100ML
1000 INJECTION, SOLUTION INTRAVENOUS CONTINUOUS
Status: DISCONTINUED | OUTPATIENT
Start: 2023-05-05 | End: 2023-05-05 | Stop reason: HOSPADM

## 2023-05-05 RX ORDER — LIDOCAINE HYDROCHLORIDE 10 MG/ML
INJECTION, SOLUTION INFILTRATION; PERINEURAL AS NEEDED
Status: DISCONTINUED | OUTPATIENT
Start: 2023-05-05 | End: 2023-05-05 | Stop reason: SURG

## 2023-05-05 RX ADMIN — SODIUM CHLORIDE, POTASSIUM CHLORIDE, SODIUM LACTATE AND CALCIUM CHLORIDE 1000 ML: 600; 310; 30; 20 INJECTION, SOLUTION INTRAVENOUS at 11:09

## 2023-05-05 RX ADMIN — SODIUM CHLORIDE, POTASSIUM CHLORIDE, SODIUM LACTATE AND CALCIUM CHLORIDE: 600; 310; 30; 20 INJECTION, SOLUTION INTRAVENOUS at 12:15

## 2023-05-05 RX ADMIN — PROPOFOL 140 MCG/KG/MIN: 10 INJECTION, EMULSION INTRAVENOUS at 11:51

## 2023-05-05 RX ADMIN — LIDOCAINE HYDROCHLORIDE 50 MG: 10 INJECTION, SOLUTION INFILTRATION; PERINEURAL at 11:51

## 2023-05-05 RX ADMIN — PROPOFOL 100 MG: 10 INJECTION, EMULSION INTRAVENOUS at 11:51

## 2023-05-05 NOTE — ANESTHESIA PREPROCEDURE EVALUATION
Anesthesia Evaluation     Patient summary reviewed and Nursing notes reviewed   NPO Solid Status: > 8 hours  NPO Liquid Status: > 2 hours           Airway   Mallampati: II  TM distance: >3 FB  Neck ROM: full  No difficulty expected  Dental - normal exam     Pulmonary - normal exam   (+) a smoker Former,   Cardiovascular - normal exam    Rhythm: regular  Rate: normal    (+) hypertension less than 2 medications, hyperlipidemia,       Neuro/Psych  (+) numbness, psychiatric history Depression,    GI/Hepatic/Renal/Endo    (+) obesity,  GERD,      Musculoskeletal     (+) back pain,   Abdominal   (+) obese,    Substance History      OB/GYN          Other   arthritis,    history of cancer      Other Comment: BCCA                  Anesthesia Plan    ASA 2     MAC     intravenous induction     Anesthetic plan, risks, benefits, and alternatives have been provided, discussed and informed consent has been obtained with: patient.    Plan discussed with CRNA.        CODE STATUS:

## 2023-05-05 NOTE — H&P
[facility]  Pre Procedure History & Physical    Chief Complaint:   History colon polyps    Subjective     HPI:   Patient 73-year-old male with history of hyperlipidemia, osteoarthritis presenting with history of colon polyps for colonoscopy surveillance.    Past Medical History:   Past Medical History:   Diagnosis Date   • Allergic    • Arthritis    • Back pain    • Basal cell carcinoma    • Colon polyps 05/04/2023    HISTORY   • Hyperlipidemia    • Numbness and tingling in hands     RESOLVED   • Numbness and tingling of left leg     RESOLVED   • Torn meniscus     pt states left knee       Past Surgical History:  Past Surgical History:   Procedure Laterality Date   • COLONOSCOPY     • TONSILLECTOMY         Family History:  Family History   Problem Relation Age of Onset   • Hyperlipidemia Mother    • Cancer Mother    • Hyperlipidemia Father    • Cancer Father    • Cancer Brother    • Melanoma Brother    • Malig Hyperthermia Neg Hx        Social History:   reports that he has quit smoking. He has never used smokeless tobacco. He reports current alcohol use. He reports that he does not use drugs.    Medications:   Medications Prior to Admission   Medication Sig Dispense Refill Last Dose   • aspirin 81 MG EC tablet Take 1 tablet by mouth Every Other Day. HELD X 2 DAYS FOR ENDO   Past Week   • atorvastatin (LIPITOR) 40 MG tablet TAKE ONE TABLET BY MOUTH EVERY EVENING 90 tablet 3 5/4/2023   • azelastine (ASTELIN) 0.1 % nasal spray 2 sprays into the nostril(s) as directed by provider 2 (Two) Times a Day. As needed for nasal allergies 1 each 12 5/4/2023   • B Complex-C (SUPER B COMPLEX/VITAMIN C) tablet Take  by mouth.   5/4/2023   • buPROPion SR (WELLBUTRIN SR) 150 MG 12 hr tablet TAKE ONE TABLET BY MOUTH DAILY FOR DEPRESSION 90 tablet 1 5/4/2023   • Cholecalciferol (VITAMIN D3) 2000 units capsule Two OTC caps  QDay to supplement Vitamin D 60 capsule prn 5/4/2023   • coenzyme Q10 100 MG capsule  "Take 1 capsule by mouth Daily.   5/4/2023   • esomeprazole (nexIUM) 20 MG capsule Take 1 capsule by mouth Every Morning Before Breakfast.   5/4/2023   • fluticasone (FLONASE) 50 MCG/ACT nasal spray SPRAY TWO SPRAYS IN EACH NOSTRIL ONCE DAILY 16 mL 5 5/4/2023   • ipratropium (ATROVENT) 0.06 % nasal spray 2 sprays into the nostril(s) as directed by provider 4 (Four) Times a Day. 2 sprays each nostril every night at bedtime 15 mL 5    • loratadine-pseudoephedrine (Claritin-D 12 Hour) 5-120 MG per 12 hr tablet Take 1 tablet by mouth Daily. As needed 30 tablet 3 Past Month   • losartan (COZAAR) 25 MG tablet TAKE ONE TABLET BY MOUTH DAILY FOR BLOOD PRESSURE 90 tablet 1 5/4/2023   • Omega-3 Fatty Acids (FISH OIL) 1200 MG capsule capsule Take 1 capsule by mouth Daily.   5/4/2023   • Semaglutide, 2 MG/DOSE, (Ozempic, 2 MG/DOSE,) 8 MG/3ML solution pen-injector Inject 2 mg under the skin into the appropriate area as directed 1 (One) Time Per Week. 9 mL 1 5/4/2023   • sildenafil (VIAGRA) 100 MG tablet TAKE ONE-HALF TO ONE TABLET BY MOUTH 1 HOUR PRIOR TO INTIMACY AS NEEDED DAILY 9 tablet 5 Past Month       Allergies:  Patient has no known allergies.    ROS:    Pertinent items are noted in HPI     Objective     Blood pressure 110/74, pulse 77, resp. rate 18, height 172.7 cm (68\"), weight 90.3 kg (199 lb), SpO2 95 %.    Physical Exam   Constitutional: Pt is oriented to person, place, and time and well-developed, well-nourished, and in no distress.   Mouth/Throat: Oropharynx is clear and moist.   Neck: Normal range of motion.   Cardiovascular: Normal rate, regular rhythm and normal heart sounds.    Pulmonary/Chest: Effort normal and breath sounds normal.   Abdominal: Soft. Nontender  Skin: Skin is warm and dry.   Psychiatric: Mood, memory, affect and judgment normal.     Assessment & Plan     Diagnosis:  History of colon polyps    Anticipated Surgical Procedure:  Colonoscopy    The risks, benefits, and alternatives of this " procedure have been discussed with the patient or the responsible party- the patient understands and agrees to proceed.

## 2023-05-05 NOTE — BRIEF OP NOTE
COLONOSCOPY  Progress Note    Juan C Kumar  5/5/2023    Pre-op Diagnosis:   History of adenomatous polyp of colon [Z86.010]       Post-Op Diagnosis Codes:     * History of adenomatous polyp of colon [Z86.010]     * Colon polyps [K63.5]     * Diverticulosis [K57.90]     * Internal hemorrhoids [K64.8]    Procedure/CPT® Codes:        Procedure(s):  COLONOSCOPY to cecum and TI with hot and cold snare polypectomies        Surgeon(s):  Frantz Singh MD    Anesthesia: Monitored Anesthesia Care    Staff:   Endo Technician: Schuyler Najera  Endo Nurse: Lele Basurto RN         Estimated Blood Loss: minimal    Urine Voided: * No values recorded between 5/5/2023 11:48 AM and 5/5/2023 12:16 PM *    Specimens:                Specimens     ID Source Type Tests Collected By Collected At Frozen?    A Large Intestine, Transverse Colon Polyp · TISSUE PATHOLOGY EXAM   Frantz Singh MD 5/5/23 1203     This specimen was not marked as sent.    B Large Intestine, Left / Descending Colon Polyp · TISSUE PATHOLOGY EXAM   Frantz Singh MD 5/5/23 1207     This specimen was not marked as sent.                Drains: * No LDAs found *    Findings: Colonoscopy to the terminal ileum with normal ileum mucosa.  Transverse colon polyp removed hot snare.  Descending colon polyp removed cold snare.  Sigmoid diverticulosis and internal hemorrhoids were noted.        Complications: None          Frantz Singh MD     Date: 5/5/2023  Time: 12:17 EDT

## 2023-05-05 NOTE — ANESTHESIA POSTPROCEDURE EVALUATION
Patient: Juan C Kumar    Procedure Summary     Date: 05/05/23 Room / Location: [facility] ENDOSCOPY 5 / [facility] ENDOSCOPY    Anesthesia Start: 1148 Anesthesia Stop: 1219    Procedure: COLONOSCOPY to cecum and TI with hot and cold snare polypectomies Diagnosis:       History of adenomatous polyp of colon      Colon polyps      Diverticulosis      Internal hemorrhoids      (History of adenomatous polyp of colon [Z86.010])    Surgeons: Frantz Singh MD Provider: Wyatt Ryan MD    Anesthesia Type: MAC ASA Status: 2          Anesthesia Type: MAC    Vitals  Vitals Value Taken Time   BP 95/64 05/05/23 1217   Temp     Pulse 75 05/05/23 1217   Resp 18 05/05/23 1217   SpO2 96 % 05/05/23 1217           Post Anesthesia Care and Evaluation    Patient location during evaluation: PHASE II  Patient participation: complete - patient participated  Level of consciousness: awake and alert  Pain management: adequate    Airway patency: patent  Anesthetic complications: No anesthetic complications  PONV Status: none  Cardiovascular status: acceptable and hemodynamically stable  Respiratory status: acceptable, nonlabored ventilation and spontaneous ventilation  Hydration status: acceptable

## 2023-05-08 LAB
LAB AP CASE REPORT: NORMAL
PATH REPORT.FINAL DX SPEC: NORMAL
PATH REPORT.GROSS SPEC: NORMAL

## 2023-06-06 ENCOUNTER — TELEPHONE (OUTPATIENT)
Dept: FAMILY MEDICINE CLINIC | Facility: CLINIC | Age: 73
End: 2023-06-06

## 2023-06-06 NOTE — TELEPHONE ENCOUNTER
"     Caller: Juan C Kumar \"Juan C Kumar\"    Relationship: Self    Best call back number: [phone]    What is the medical concern/diagnosis: BACK PAIN     What specialty or service is being requested: PHYSICAL THERAPY    What is the provider, practice or medical service name: [facility]    What is the office location: [address] [place] [address]     What is the office phone number: FAX: [phone]    Any additional details: THEY NEED A NEW REFERRAL TO SEE PATIENT      "

## 2023-06-07 ENCOUNTER — TELEPHONE (OUTPATIENT)
Dept: FAMILY MEDICINE CLINIC | Facility: CLINIC | Age: 73
End: 2023-06-07
Payer: MEDICARE

## 2023-06-07 DIAGNOSIS — M54.42 LEFT-SIDED LOW BACK PAIN WITH SCIATICA, SCIATICA LATERALITY UNSPECIFIED, UNSPECIFIED CHRONICITY: Primary | ICD-10-CM

## 2023-06-07 DIAGNOSIS — M54.16 LUMBAR RADICULOPATHY: ICD-10-CM

## 2023-06-09 ENCOUNTER — TELEPHONE (OUTPATIENT)
Dept: ORTHOPEDIC SURGERY | Facility: CLINIC | Age: 73
End: 2023-06-09

## 2023-06-09 NOTE — TELEPHONE ENCOUNTER
"  Caller: Juan C Kumar \"Juan C Kumar\"    Relationship to patient: Self    Best call back number: [phone]    Chief complaint: LEFT KNEE PAIN    Type of visit: INJECTION    Requested date: LAST WEEK OF THE MONTH      "

## 2023-06-19 ENCOUNTER — TELEPHONE (OUTPATIENT)
Dept: GASTROENTEROLOGY | Facility: CLINIC | Age: 73
End: 2023-06-19
Payer: MEDICARE

## 2023-06-19 RX ORDER — BUPROPION HYDROCHLORIDE 150 MG/1
TABLET, EXTENDED RELEASE ORAL
Qty: 90 TABLET | Refills: 1 | Status: SHIPPED | OUTPATIENT
Start: 2023-06-19

## 2023-06-19 NOTE — TELEPHONE ENCOUNTER
Rx Refill Note  Requested Prescriptions     Pending Prescriptions Disp Refills    buPROPion SR (WELLBUTRIN SR) 150 MG 12 hr tablet [Pharmacy Med Name: buPROPion HCL  MG TABLET] 90 tablet 1     Sig: TAKE ONE TABLET BY MOUTH DAILY FOR DEPRESSION      Last office visit with prescribing clinician: 3/14/2023   Last telemedicine visit with prescribing clinician: Visit date not found   Next office visit with prescribing clinician: 9/15/2023                         Would you like a call back once the refill request has been completed: [] Yes [] No    If the office needs to give you a call back, can they leave a voicemail: [] Yes [] No    Addie Erwin  06/19/23, 13:14 EDT

## 2023-06-19 NOTE — TELEPHONE ENCOUNTER
----- Message from Frantz Singh MD sent at 6/4/2023 10:26 PM EDT -----  Polyp benign repeat: 5 years as discussed   [facility]           2 MONTH WELL CHILD EXAM      Darryn is a 1 m.o. male infant    History given by Mother and Father    CONCERNS: Yes  - right ride of head is flat, he always looks to the left - discussed plagio    BIRTH HISTORY      Birth history reviewed in EMR. Yes     SCREENINGS     NB HEARING SCREEN: Pass   SCREEN #1: Normal    SCREEN #2: Normal   Selective screenings indicated? ie B/P with specific conditions or + risk for vision : No    Depression: Maternal Ladson  Ladson  Depression Scale:  In the Past 7 Days  I have been able to laugh and see the funny side of things.: As much as I always could  I have looked forward with enjoyment to things.: As much as I ever did  I have blamed myself unnecessarily when things went wrong.: Not very often  I have been anxious or worried for no good reason.: Hardly ever  I have felt scared or panicky for no good reason.: No, not at all  Things have been getting on top of me.: No, I have been coping as well as ever  I have been so unhappy that I have had difficulty sleeping.: Not at all  I have felt sad or miserable.: No, not at all  I have been so unhappy that I have been crying.: No, never  The thought of harming myself has occurred to me.: Never  Ladson  Depression Scale Total: 2    Received Hepatitis B vaccine at birth? Yes    GENERAL     NUTRITION HISTORY:   Breast, every 2-3 hours, latches on well, good suck.  only occasionally supplements with formula.  Powder mixed 1 scoop/2oz water  Not giving any other substances by mouth.    MULTIVITAMIN: Recommended Multivitamin with 400iu of Vitamin D po qd if exclusively  or taking less than 24 oz of formula a day.    ELIMINATION:   Has ample wet diapers per day, and has 6 BM per day. BM is soft and yellow in color.    SLEEP PATTERN:    Sleeps through the night? Yes  Sleeps in crib? Yes  Sleeps with parent? No  Sleeps on back? Yes    SOCIAL  HISTORY:   The patient lives at home with mother, father, sister(s), brother(s), and does attend day care. Has 3 siblings. Patient is a twin.  Smokers at home? No    HISTORY     Patient's medications, allergies, past medical, surgical, social and family histories were reviewed and updated as appropriate.  History reviewed. No pertinent past medical history.  Patient Active Problem List    Diagnosis Date Noted    Umbilical hernia, congenital 2023    Twin birth 2023     jaundice 2023     Family History   Problem Relation Age of Onset    No Known Problems Maternal Grandmother         Copied from mother's family history at birth    No Known Problems Maternal Grandfather         Copied from mother's family history at birth     No current outpatient medications on file.     No current facility-administered medications for this visit.     No Known Allergies    REVIEW OF SYSTEMS     Constitutional: Afebrile, good appetite, alert.  HENT: + flat head shape.  No significant congestion.   Eyes: Negative for any discharge in eyes, appears to focus.  Respiratory: Negative for any difficulty breathing or noisy breathing.   Cardiovascular: Negative for changes in color/activity.   Gastrointestinal: Negative for any vomiting or excessive spitting up, constipation or blood in stool. Negative for any issues with belly button.  Genitourinary: Ample amount of wet diapers.   Musculoskeletal: Negative for any sign of arm pain or leg pain with movement.   Skin: Negative for rash or skin infection.  Neurological: Negative for any weakness or decrease in strength.     Psychiatric/Behavioral: Appropriate for age.   No MaternalPostpartum Depression    DEVELOPMENTAL SURVEILLANCE     Lifts head 45 degrees when prone? Yes  Responds to sounds? Yes  Makes sounds to let you know he is happy or upset? Yes  Follows 90 degrees? Yes  Follows past midline? Yes  [place]? Yes  Hands to midline? Yes  Smiles responsively? Yes  Open and  "shut hands and briefly bring them together? Yes    OBJECTIVE     PHYSICAL EXAM:   Reviewed vital signs and growth parameters in EMR.   Pulse 156   Temp 36.4 °C (97.6 °F) (Temporal)   Resp 49   Ht 0.559 m (1' 10\")   Wt 5.305 kg (11 lb 11.1 oz)   HC 39 cm (15.35\")   BMI 16.99 kg/m²   Length - No height on file for this encounter.  Weight - 37 %ile (Z= -0.34) based on WHO (Boys, 0-2 years) weight-for-age data using vitals from 2023.  HC - 47 %ile (Z= -0.06) based on WHO (Boys, 0-2 years) head circumference-for-age based on Head Circumference recorded on 2023.    GENERAL: This is an alert, active infant in no distress.   HEAD: Right sided positional plagiocephaly with forehead involvement, atraumatic. Anterior fontanelle is open, soft and flat.   EYES: PERRL, positive red reflex bilaterally. No conjunctival infection or discharge. Follows well and appears to see.  EARS: TM’s are transparent with good landmarks. Canals are patent. Appears to hear.  NOSE: Nares are patent and free of congestion.  THROAT: Oropharynx has no lesions, moist mucus membranes, palate intact. Vigorous suck.  NECK: Supple, no lymphadenopathy or masses. No palpable masses on bilateral clavicles.   HEART: Regular rate and rhythm without murmur. Brachial and femoral pulses are 2+ and equal.   LUNGS: Clear bilaterally to auscultation, no wheezes or rhonchi. No retractions, nasal flaring, or distress noted.  ABDOMEN: Normal bowel sounds, soft and non-tender without hepatomegaly or splenomegaly or masses.  GENITALIA: normal male - testes descended bilaterally? yes, uncircumcised  MUSCULOSKELETAL: Hips have normal range of motion with negative Mohan and Ortolani. Spine is straight. Sacrum normal without dimple. Extremities are without abnormalities. Moves all extremities well and symmetrically with normal tone.    NEURO: Normal devan, palmar grasp, rooting, fencing, babinski, and stepping reflexes. Vigorous suck.  SKIN: Intact without " jaundice, significant rash or birthmarks. Skin is warm, dry, and pink.     ASSESSMENT AND PLAN     1. Well Child Exam:  Healthy 1 m.o. male infant with good growth and development.  Anticipatory guidance was reviewed and age appropriate Bright Futures handout was given.   2. Return to clinic for 4 month well child exam or as needed.  3. Vaccine Information statements given for each vaccine. Discussed benefits and side effects of each vaccine given today with patient /family, answered all patient /family questions. DtaP, IPV, HIB, Hep B, Rota, and PCV 13.  4. Safety Priority: Car safety seats, safe sleep, safe home environment.     5. Need for vaccination    - DTAP/IPV/HIB/HEPB Combined Vaccine (6W-4Y)  - Pneumococcal Conjugate Vaccine 13-Valent  - Rotavirus Vaccine Pentavalent 3-Dose Oral [[id number]]    6. Person consulting on behalf of another person  Hal is negative, mother knows if anything changes she is able to contact our office.      7. Positional plagiocephaly  8. Twin birth  Discussed with parents this is not harmful to the brain, this happens when we spend a lot of time on the back of the head on one particular side, in this case his/ner right side.  For the next 2 months all activities should be on his/her right, the door for his/her crib should be on the left side and do as much tummy time as possible during the day while awake.  With the severity will send to neurosurgery for consideration of helmet therapy.      - Referral to Pediatric Neurosurgery    Return to clinic for any of the following:   Decreased wet or poopy diapers  Decreased feeding  Fever greater than 101 if vaccinations given today or 100.4 if no vaccinations today.    Baby not waking up for feeds on his own most of time.   Irritability  Lethargy  Significant rash   Dry sticky mouth.   Any questions or concerns.

## 2023-06-19 NOTE — TELEPHONE ENCOUNTER
Colonoscopy placed in  and recall for 5/5/28.  Pt reviewed results via [facility]. Sent pt MyChart msg advising of results. Advised to call if any questions.

## 2023-07-12 ENCOUNTER — TELEPHONE (OUTPATIENT)
Dept: ORTHOPEDIC SURGERY | Facility: CLINIC | Age: 73
End: 2023-07-12

## 2023-07-12 NOTE — TELEPHONE ENCOUNTER
"    Caller: Juan C Kumar \"Juan C Kumar\"    Relationship to patient: Self    Best call back number: [phone]    Patient is needing: PATIENT WANTS TO SCHEDULE LT KNEE SURGERY WITH DR. HENNING FOR SEPTEMBER. PATIENT REQUESTING A CALL BACK TOMORROW ASAP.    "

## 2023-08-07 ENCOUNTER — TELEPHONE (OUTPATIENT)
Dept: NEUROSURGERY | Facility: CLINIC | Age: 73
End: 2023-08-07
Payer: MEDICARE

## 2023-08-07 NOTE — TELEPHONE ENCOUNTER
Called patient. Informed patient that on 08/31  will be out of office. Patient stated that is okay. He is feeling better and PT has helped. Patient would not like to follow up. Informed patient if he needs anything to let us know.

## 2023-09-11 ENCOUNTER — PREP FOR SURGERY (OUTPATIENT)
Dept: OTHER | Facility: HOSPITAL | Age: 73
End: 2023-09-11
Payer: MEDICARE

## 2023-09-11 DIAGNOSIS — M17.12 PRIMARY OSTEOARTHRITIS OF LEFT KNEE: Primary | ICD-10-CM

## 2023-09-11 RX ORDER — CHLORHEXIDINE GLUCONATE 500 MG/1
CLOTH TOPICAL TAKE AS DIRECTED
OUTPATIENT
Start: 2023-09-11

## 2023-09-13 ENCOUNTER — PRE-ADMISSION TESTING (OUTPATIENT)
Dept: PREADMISSION TESTING | Facility: HOSPITAL | Age: 73
End: 2023-09-13
Payer: MEDICARE

## 2023-09-13 VITALS
BODY MASS INDEX: 28.19 KG/M2 | OXYGEN SATURATION: 97 % | HEART RATE: 98 BPM | RESPIRATION RATE: 16 BRPM | WEIGHT: 186 LBS | TEMPERATURE: 97 F | DIASTOLIC BLOOD PRESSURE: 94 MMHG | HEIGHT: 68 IN | SYSTOLIC BLOOD PRESSURE: 137 MMHG

## 2023-09-13 DIAGNOSIS — M17.12 PRIMARY OSTEOARTHRITIS OF LEFT KNEE: ICD-10-CM

## 2023-09-13 LAB
BILIRUB UR QL STRIP: NEGATIVE
CLARITY UR: CLEAR
COLOR UR: YELLOW
GLUCOSE UR STRIP-MCNC: NEGATIVE MG/DL
HGB UR QL STRIP.AUTO: NEGATIVE
KETONES UR QL STRIP: NEGATIVE
LEUKOCYTE ESTERASE UR QL STRIP.AUTO: NEGATIVE
NITRITE UR QL STRIP: NEGATIVE
PH UR STRIP.AUTO: 5.5 [PH] (ref 5–8)
PROT UR QL STRIP: NEGATIVE
QT INTERVAL: 395 MS
QTC INTERVAL: 462 MS
SP GR UR STRIP: 1.02 (ref 1–1.03)
UROBILINOGEN UR QL STRIP: NORMAL

## 2023-09-13 PROCEDURE — 83036 HEMOGLOBIN GLYCOSYLATED A1C: CPT | Performed by: NURSE PRACTITIONER

## 2023-09-13 PROCEDURE — G0103 PSA SCREENING: HCPCS | Performed by: NURSE PRACTITIONER

## 2023-09-13 PROCEDURE — 85025 COMPLETE CBC W/AUTO DIFF WBC: CPT | Performed by: NURSE PRACTITIONER

## 2023-09-13 PROCEDURE — 93005 ELECTROCARDIOGRAM TRACING: CPT

## 2023-09-13 PROCEDURE — 80061 LIPID PANEL: CPT | Performed by: NURSE PRACTITIONER

## 2023-09-13 PROCEDURE — 93010 ELECTROCARDIOGRAM REPORT: CPT | Performed by: INTERNAL MEDICINE

## 2023-09-13 PROCEDURE — 81003 URINALYSIS AUTO W/O SCOPE: CPT

## 2023-09-13 PROCEDURE — 80053 COMPREHEN METABOLIC PANEL: CPT | Performed by: NURSE PRACTITIONER

## 2023-09-13 NOTE — DISCHARGE INSTRUCTIONS
Take the following medications the morning of surgery:    WELLBUTRIN AM OF SURGERY    TIME OF ARRIVAL WILL BE CALLED TO YOU ONE TO TWO DAYS BEFORE SURGERY    If you are on prescription narcotic pain medication to control your pain you may also take that medication the morning of surgery.    General Instructions:  Do not eat solid food after midnight the night before surgery.  You may drink clear liquids day of surgery but must stop at least one hour before your hospital arrival time.  It is beneficial for you to have a clear drink that contains carbohydrates the day of surgery.  We suggest a 12 to 20 ounce bottle of Gatorade or Powerade for non-diabetic patients or a 12 to 20 ounce bottle of G2 or Powerade Zero for diabetic patients. (Pediatric patients, are not advised to drink a 12 to 20 ounce carbohydrate drink)    Clear liquids are liquids you can see through.  Nothing red in color.     Plain water                               Sports drinks  Sodas                                   Gelatin (Jell-O)  Fruit juices without pulp such as white grape juice and apple juice  Popsicles that contain no fruit or yogurt  Tea or coffee (no cream or milk added)  Gatorade / Powerade  G2 / Powerade Zero    Infants may have breast milk up to four hours before surgery.  Infants drinking formula may drink formula up to six hours before surgery.   Patients who avoid smoking, chewing tobacco and alcohol for 4 weeks prior to surgery have a reduced risk of post-operative complications.  Quit smoking as many days before surgery as you can.  Do not smoke, use chewing tobacco or drink alcohol the day of surgery.   If applicable bring your C-PAP/ BI-PAP machine in with you to preop day of surgery.  Bring any papers given to you in the doctor’s office.  Wear clean comfortable clothes.  Do not wear contact lenses, false eyelashes or make-up.  Bring a case for your glasses.   Bring crutches or walker if applicable.  Remove all piercings.   Leave jewelry and any other valuables at home.  Hair extensions with metal clips must be removed prior to surgery.  The Pre-Admission Testing nurse will instruct you to bring medications if unable to obtain an accurate list in Pre-Admission Testing.            Preventing a Surgical Site Infection:  For 2 to 3 days before surgery, avoid shaving with a razor because the razor can irritate skin and make it easier to develop an infection.    Any areas of open skin can increase the risk of a post-operative wound infection by allowing bacteria to enter and travel throughout the body.  Notify your surgeon if you have any skin wounds / rashes even if it is not near the expected surgical site.  The area will need assessed to determine if surgery should be delayed until it is healed.  The night prior to surgery shower using a fresh bar of anti-bacterial soap (such as Dial) and clean washcloth.  Sleep in a clean bed with clean clothing.  Do not allow pets to sleep with you.  Shower on the morning of surgery using a fresh bar of anti-bacterial soap (such as Dial) and clean washcloth.  Dry with a clean towel and dress in clean clothing.  Ask your surgeon if you will be receiving antibiotics prior to surgery.  Make sure you, your family, and all healthcare providers clean their hands with soap and water or an alcohol based hand  before caring for you or your wound.    Day of surgery:  Your arrival time is approximately two hours before your scheduled surgery time.  Upon arrival, a Pre-op nurse and Anesthesiologist will review your health history, obtain vital signs, and answer questions you may have.  The only belongings needed at this time will be a list of your home medications and if applicable your C-PAP/BI-PAP machine.  A Pre-op nurse will start an IV and you may receive medication in preparation for surgery, including something to help you relax.     Please be aware that surgery does come with discomfort.  We want  to make every effort to control your discomfort so please discuss any uncontrolled symptoms with your nurse.   Your doctor will most likely have prescribed pain medications.      If you are going home after surgery you will receive individualized written care instructions before being discharged.  A responsible adult must drive you to and from the hospital on the day of your surgery and stay with you for 24 hours.  Discharge prescriptions can be filled by the hospital pharmacy during regular pharmacy hours.  If you are having surgery late in the day/evening your prescription may be e-prescribed to your pharmacy.  Please verify your pharmacy hours or chose a 24 hour pharmacy to avoid not having access to your prescription because your pharmacy has closed for the day.    If you are staying overnight following surgery, you will be transported to your hospital room following the recovery period.  [facility] has all private rooms.    If you have any questions please call Pre-Admission Testing at [phone].  Deductibles and co-payments are collected on the day of service. Please be prepared to pay the required co-pay, deductible or deposit on the day of service as defined by your plan.    Call your surgeon immediately if you experience any of the following symptoms:  Sore Throat  Shortness of Breath or difficulty breathing  Cough  Chills  Body soreness or muscle pain  Headache  Fever  New loss of taste or smell  Do not arrive for your surgery ill.  Your procedure will need to be rescheduled to another time.  You will need to call your physician before the day of surgery to avoid any unnecessary exposure to hospital staff as well as other patients.  CHLORHEXIDINE CLOTH INSTRUCTIONS  The morning of surgery follow these instructions using the Chlorhexidine cloths you've been given.  These steps reduce bacteria on the body.  Do not use the cloths near your eyes, ears mouth, genitalia or on open wounds.   Throw the cloths away after use but do not try to flush them down a toilet.      Open and remove one cloth at a time from the package.    Leave the cloth unfolded and begin the bathing.  Massage the skin with the cloths using gentle pressure to remove bacteria.  Do not scrub harshly.   Follow the steps below with one 2% CHG cloth per area (6 total cloths).  One cloth for neck, shoulders and chest.  One cloth for both arms, hands, fingers and underarms (do underarms last).  One cloth for the abdomen followed by groin.  One cloth for right leg and foot including between the toes.  One cloth for left leg and foot including between the toes.  The last cloth is to be used for the back of the neck, back and buttocks.    Allow the CHG to air dry 3 minutes on the skin which will give it time to work and decrease the chance of irritation.  The skin may feel sticky until it is dry.  Do not rinse with water or any other liquid or you will lose the beneficial effects of the CHG.  If mild skin irritation occurs, do rinse the skin to remove the CHG.  Report this to the nurse at time of admission.  Do not apply lotions, creams, ointments, deodorants or perfumes after using the clothes. Dress in clean clothes before coming to the hospital.

## 2023-09-15 ENCOUNTER — OFFICE VISIT (OUTPATIENT)
Dept: ORTHOPEDIC SURGERY | Facility: CLINIC | Age: 73
End: 2023-09-15
Payer: MEDICARE

## 2023-09-15 ENCOUNTER — OFFICE VISIT (OUTPATIENT)
Dept: FAMILY MEDICINE CLINIC | Facility: CLINIC | Age: 73
End: 2023-09-15
Payer: MEDICARE

## 2023-09-15 VITALS
SYSTOLIC BLOOD PRESSURE: 120 MMHG | DIASTOLIC BLOOD PRESSURE: 68 MMHG | HEIGHT: 69 IN | TEMPERATURE: 97 F | HEART RATE: 85 BPM | OXYGEN SATURATION: 96 % | WEIGHT: 190.1 LBS | BODY MASS INDEX: 28.16 KG/M2

## 2023-09-15 VITALS — HEIGHT: 69 IN | BODY MASS INDEX: 28.02 KG/M2 | TEMPERATURE: 97.8 F | WEIGHT: 189.2 LBS

## 2023-09-15 DIAGNOSIS — R73.03 PREDIABETES: Primary | ICD-10-CM

## 2023-09-15 DIAGNOSIS — E78.2 MIXED HYPERLIPIDEMIA: ICD-10-CM

## 2023-09-15 DIAGNOSIS — Z13.6 ENCOUNTER FOR SCREENING FOR VASCULAR DISEASE: ICD-10-CM

## 2023-09-15 DIAGNOSIS — Z01.818 PREOP EXAMINATION: Primary | ICD-10-CM

## 2023-09-15 DIAGNOSIS — Z23 FLU VACCINE NEED: ICD-10-CM

## 2023-09-15 DIAGNOSIS — I10 MILD ESSENTIAL HYPERTENSION: ICD-10-CM

## 2023-09-15 DIAGNOSIS — R94.31 ABNORMAL ECG: ICD-10-CM

## 2023-09-15 PROCEDURE — 3074F SYST BP LT 130 MM HG: CPT | Performed by: NURSE PRACTITIONER

## 2023-09-15 PROCEDURE — 99214 OFFICE O/P EST MOD 30 MIN: CPT | Performed by: NURSE PRACTITIONER

## 2023-09-15 PROCEDURE — 3078F DIAST BP <80 MM HG: CPT | Performed by: NURSE PRACTITIONER

## 2023-09-15 PROCEDURE — 90662 IIV NO PRSV INCREASED AG IM: CPT | Performed by: NURSE PRACTITIONER

## 2023-09-15 PROCEDURE — G0008 ADMIN INFLUENZA VIRUS VAC: HCPCS | Performed by: NURSE PRACTITIONER

## 2023-09-15 NOTE — H&P (VIEW-ONLY)
History & Physical       Patient: Juan C Kumar    YOB: 1950    Medical Record Number: [id number]    Chief Complaints: Left knee osteoarthritis    History of Present Illness: 73 y.o. male presents today in anticipation of upcoming knee replacement surgery.  Patient has a long history of worsening symptoms.  Describes the pain as severe, constant, and typically dull and achy. He has tried and failed prolonged conservative treatment. He is struggling with routine daily activities.  This has become a significant issue for overall quality of life. He cannot walk any prolonged distances without having to rest.     Allergies: No Known Allergies    Medications:   Home Medications:    Current Outpatient Medications:     aspirin 81 MG EC tablet, Take 1 tablet by mouth Every Other Day. TO HOLD 1 WEEK BEFORE SURGERY, Disp: , Rfl:     atorvastatin (LIPITOR) 40 MG tablet, TAKE ONE TABLET BY MOUTH EVERY EVENING (Patient taking differently: Take 1 tablet by mouth Every Night. TAKE ONE TABLET BY MOUTH EVERY EVENING), Disp: 90 tablet, Rfl: 3    azelastine (ASTELIN) 0.1 % nasal spray, 2 sprays into the nostril(s) as directed by provider 2 (Two) Times a Day. As needed for nasal allergies (Patient taking differently: 2 sprays into the nostril(s) as directed by provider 2 (Two) Times a Day As Needed. As needed for nasal allergies), Disp: 1 each, Rfl: 12    B Complex-C (SUPER B COMPLEX/VITAMIN C) tablet, Take 1 tablet by mouth Daily., Disp: , Rfl:     buPROPion SR (WELLBUTRIN SR) 150 MG 12 hr tablet, TAKE ONE TABLET BY MOUTH DAILY FOR DEPRESSION (Patient taking differently: Take 1 tablet by mouth Every Morning.), Disp: 90 tablet, Rfl: 1    Chlorhexidine Gluconate 2 % pads, Apply  topically. As directed pre op, Disp: , Rfl:     Cholecalciferol (VITAMIN D3) 2000 units capsule, Two OTC caps  QDay to supplement Vitamin D (Patient taking differently: Take 2 capsules by mouth Daily. Two OTC caps  QDay to supplement Vitamin D),  Disp: 60 capsule, Rfl: prn    coenzyme Q10 100 MG capsule, Take 1 capsule by mouth Daily., Disp: , Rfl:     esomeprazole (nexIUM) 20 MG capsule, Take 1 capsule by mouth Every Night., Disp: , Rfl:     fluticasone (FLONASE) 50 MCG/ACT nasal spray, SPRAY TWO SPRAYS IN EACH NOSTRIL ONCE DAILY (Patient taking differently: 2 sprays into the nostril(s) as directed by provider Daily As Needed.), Disp: 16 mL, Rfl: 5    ipratropium (ATROVENT) 0.06 % nasal spray, 2 sprays into the nostril(s) as directed by provider 4 (Four) Times a Day. 2 sprays each nostril every night at bedtime, Disp: 15 mL, Rfl: 5    loratadine-pseudoephedrine (Claritin-D 12 Hour) 5-120 MG per 12 hr tablet, Take 1 tablet by mouth Daily. As needed (Patient taking differently: Take 1 tablet by mouth Daily As Needed. As needed), Disp: 30 tablet, Rfl: 3    losartan (COZAAR) 25 MG tablet, TAKE ONE TABLET BY MOUTH DAILY FOR BLOOD PRESSURE (Patient taking differently: Take 1 tablet by mouth Daily.), Disp: 90 tablet, Rfl: 1    Omega-3 Fatty Acids (FISH OIL) 1200 MG capsule capsule, Take 1 capsule by mouth Daily. TO HOLD 1 WEEK BEFORE SURGERY, Disp: , Rfl:     Semaglutide, 2 MG/DOSE, (Ozempic, 2 MG/DOSE,) 8 MG/3ML solution pen-injector, Inject 2 mg under the skin into the appropriate area as directed 1 (One) Time Per Week. (Patient taking differently: Inject 2 mg under the skin into the appropriate area as directed 1 (One) Time Per Week. SUNDAYS.  HOLD 1 WEEK BEFORE SURGERY), Disp: 9 mL, Rfl: 1    sildenafil (VIAGRA) 100 MG tablet, TAKE ONE-HALF TO ONE TABLET BY MOUTH 1 HOUR PRIOR TO INTIMACY AS NEEDED DAILY (Patient taking differently: Take 1 tablet by mouth As Needed. TAKE ONE-HALF TO ONE TABLET BY MOUTH 1 HOUR PRIOR TO INTIMACY AS NEEDED DAILY.  HOLD 3 TO 5 DAYS BEFORE SURGERY), Disp: 9 tablet, Rfl: 5    Past Medical History:   Diagnosis Date    Arthritis     Back pain     Basal cell carcinoma     Colon polyps 05/04/2023    HISTORY    Depression     Diabetes  "mellitus     ED (erectile dysfunction)     Enlarged prostate     GERD (gastroesophageal reflux disease)     Hyperlipidemia     Numbness and tingling in hands     RESOLVED    Numbness and tingling of left leg     RESOLVED    Torn meniscus     LEFT          Past Surgical History:   Procedure Laterality Date    CARPAL TUNNEL RELEASE Bilateral     COLONOSCOPY      COLONOSCOPY N/A 05/05/2023    Procedure: COLONOSCOPY to cecum and TI with hot and cold snare polypectomies;  Surgeon: Frantz Singh MD;  Location: [facility] ENDOSCOPY;  Service: Gastroenterology;  Laterality: N/A;  PRE- hx of polyps  POST- polyps, diverticulosis    TONSILLECTOMY            Social History     Occupational History    Not on file   Tobacco Use    Smoking status: Former     Passive exposure: Past    Smokeless tobacco: Never    Tobacco comments:     QUIT 1975   Vaping Use    Vaping Use: Never used   Substance and Sexual Activity    Alcohol use: Yes     Comment: social    Drug use: No    Sexual activity: Defer      Social History     Social History Narrative    Not on file          Family History   Problem Relation Age of Onset    Hyperlipidemia Mother     Cancer Mother     Hyperlipidemia Father     Cancer Father     Cancer Brother     Melanoma Brother     Malig Hyperthermia Neg Hx        Review of Systems:  A 14-point review of systems is reviewed with the patient.  Pertinent positives are listed above.  All others are negative.    Physical Exam: 73 y.o. male    Vitals:    09/15/23 1249   Temp: 97.8 °F (36.6 °C)   Weight: 85.8 kg (189 lb 3.2 oz)   Height: 175.3 cm (69\")       General:  Patient is awake and alert.  Appears in no acute distress or discomfort.    Psych:  Affect and demeanor are appropriate.    Eyes:  Conjunctivae and sclerae; appear grossly normal.  Eyes track well and EOM seems to be intact.    Ears:  No gross abnormalities.  Hearing adequate for the exam.    Cardiovascular:  Regular rate and rhythm.    Lungs:  Good chest " expansion.  Breathing unlabored.    Lymph:  No palpable adenopathy about neck or axillae.    Left lower extremity:  Skin benign and intact without evidence for swelling, masses or atrophy.  No palpable masses.  Focal tenderness noted over medial joint line.  ROM is from 0-120°.   Knee is stable on exam.  Good strength throughout the lower leg and foot.  Intact sensation throughout.  Palpable pedal pulses with brisk cap refill.    Diagnostic Tests:  Lab Results   Component Value Date    GLUCOSE 104 (H) 09/13/2023    CALCIUM 9.4 09/13/2023     09/13/2023    K 4.2 09/13/2023    CO2 25.0 09/13/2023     09/13/2023    BUN 13 09/13/2023    CREATININE 0.80 09/13/2023    EGFRIFAFRI 98 01/28/2022    EGFRIFNONA 85 01/28/2022    BCR 16.3 09/13/2023    ANIONGAP 10.0 09/13/2023     Lab Results   Component Value Date    WBC 7.23 09/13/2023    HGB 15.9 09/13/2023    HCT 47.6 09/13/2023    MCV 91.2 09/13/2023     09/13/2023     No results found for: INR, PROTIME    Imaging:  AP, merchant and lateral views of the left knee are ordered and reviewed along with a full length alignment x-ray.  These x-rays were taken to evaluate his complaint and presurgical planning.  These compared to previous x-rays.  The x-rays show advanced medial compartment osteoarthritis with joint space narrowing, osteophyte formation, and subchondral sclerosis.  The long leg alignment x-ray shows mild varus malalignment with the weightbearing axis passing through medial compartment.     Assessment:  Left knee medial compartment osteoarthritis with medial meniscal tear    Plan: We will plan on proceeding with a left total knee arthroplasty at the patient's request.  I reviewed details of procedure with patient today and discussed all the risks, benefits, alternatives, and limitations of the procedure in laymen's terms with the risks including but not limited to:  neurovascular damage resulting in permanent dysfunction or footdrop and potential  need for further surgery, bleeding, infection, hematoma, chronic pain, worsening of pain, persistent symptoms potentially necessitating revision, prosthesis-related problems including loosening or allergy, swelling, loss of motion and arthrofibrosis, weakness, stiffness, instability, DVT, pulmonary embolus, death, stroke, complex regional pain syndrome, and need for additional procedures.  Patient verbalized understanding, and was given the opportunity to ask and have all questions answered today.  No guarantees were given regarding results of surgery.      Patient is planning for hospital dismissal same day of surgery.  Denies history of DVT or metal allergy.    Jaki Stevens, APRN    09/14/23

## 2023-09-15 NOTE — PROGRESS NOTES
"Chief Complaint  Follow-up    Subjective        Juan C Kumar presents to [facility]  History of Present Illness  Very pleasant gentleman here today follow-up essential hypertension controlled nicely,  Takes a low-dose losartan 25 mg hyperlipidemia takes a statin appropriately prediabetes with obesity obesity serious with comorbidity patient is doing spectacular with Ozempic his A1c is improved down to 6 and is down 20 pounds as well he is having no problems and would like to continue.  Recently had an EKG for upcoming surgery.  He has questions as is abnormal what it may be,  He has Q waves in the anterior leads   He has no exertional chest pains no history of heart problems he does take an aspirin daily.    Objective   Vital Signs:  /68   Pulse 85   Temp 97 °F (36.1 °C) (Temporal)   Ht 175.3 cm (69\")   Wt 86.2 kg (190 lb 1.6 oz)   SpO2 96%   BMI 28.07 kg/m²   Estimated body mass index is 28.07 kg/m² as calculated from the following:    Height as of this encounter: 175.3 cm (69\").    Weight as of this encounter: 86.2 kg (190 lb 1.6 oz).            Physical Exam  Vitals reviewed.   Constitutional:       Appearance: He is well-developed.   HENT:      Head: Normocephalic.      Nose: Nose normal.   Eyes:      General: No scleral icterus.     Conjunctiva/sclera: Conjunctivae normal.      Pupils: Pupils are equal, round, and reactive to light.   Neck:      Thyroid: No thyromegaly.      Vascular: No JVD.   Cardiovascular:      Rate and Rhythm: Normal rate and regular rhythm.      Heart sounds: Normal heart sounds. No murmur heard.    No friction rub. No gallop.   Pulmonary:      Effort: Pulmonary effort is normal. No respiratory distress.      Breath sounds: Normal breath sounds. No stridor. No wheezing or rales.   Abdominal:      General: Bowel sounds are normal. There is no distension.      Palpations: Abdomen is soft.      Tenderness: There is no abdominal tenderness.      " Comments: No hepatosplenomegaly, no ascites,   Musculoskeletal:         General: No tenderness.      Cervical back: Neck supple.   Lymphadenopathy:      Cervical: No cervical adenopathy.   Skin:     General: Skin is warm and dry.      Findings: No erythema or rash.   Neurological:      Mental Status: He is alert and oriented to person, place, and time.      Deep Tendon Reflexes: Reflexes are normal and symmetric.   Psychiatric:         Behavior: Behavior normal.         Thought Content: Thought content normal.         Judgment: Judgment normal.      Result Review :                Assessment and Plan   Diagnoses and all orders for this visit:    1. Prediabetes (Primary)    2. Abnormal ECG  -     Ambulatory Referral to Cardiology    3. Mild essential hypertension    4. Mixed hyperlipidemia    5. Encounter for screening for vascular disease  -     CT Cardiac Calcium Score Without Dye    6. Flu vaccine need  -     Fluzone High-Dose 65+yrs ([id number])           I spent 30 minutes caring for Juan C on this date of service. This time includes time spent by me in the following activities:preparing for the visit, reviewing tests, obtaining and/or reviewing a separately obtained history, performing a medically appropriate examination and/or evaluation , counseling and educating the patient/family/caregiver, ordering medications, tests, or procedures, referring and communicating with other health care professionals , documenting information in the medical record, and care coordination  Follow Up   Return in about 6 months (around 3/15/2024) for Labs before next visit, Medicare Wellness.  Patient was given instructions and counseling regarding his condition or for health maintenance advice. Please see specific information pulled into the AVS if appropriate.     Patient Instructions   Discharge instructions continue healthy diet and exercise you are doing spectacular  Read the prediabetes handout given you today  Continue your  journey, challenge you  10 to 20 pounds over the next year  Slow steady progress debrided better feel better walk better,  Improvement with your arthritis your knee,    Calories should be around 1800 daily  Intermittent fasting  Diabetic teaching to reverse insulin resistance and to prevent diabetes and for healthy eating plan.  As long as you are doing well follow-up in 6 months should you decreased 10 pounds or more likely will need to look at your blood pressure medicine consider holding or decreasing?       Answers submitted by the patient for this visit:  Primary Reason for Visit (Submitted on 9/8/2023)  What is the primary reason for your visit?: Physical    Discharge instructions continue healthy diet and exercise you are doing spectacular  Read the prediabetes handout given you today  Continue your journey, challenge you  10 to 20 pounds over the next year  Slow steady progress debrided better feel better walk better,  Improvement with your arthritis your knee,    Calories should be around 1800 daily  Intermittent fasting  Diabetic teaching to reverse insulin resistance and to prevent diabetes and for healthy eating plan.  As long as you are doing well follow-up in 6 months should you decreased 10 pounds or more likely will need to look at your blood pressure medicine consider holding or decreasing?

## 2023-09-15 NOTE — PROGRESS NOTES
History & Physical       Patient: Juan C Kumar    YOB: 1950    Medical Record Number: [id number]    Chief Complaints: Left knee osteoarthritis    History of Present Illness: 73 y.o. male presents today in anticipation of upcoming knee replacement surgery.  Patient has a long history of worsening symptoms.  Describes the pain as severe, constant, and typically dull and achy. He has tried and failed prolonged conservative treatment. He is struggling with routine daily activities.  This has become a significant issue for overall quality of life. He cannot walk any prolonged distances without having to rest.     Allergies: No Known Allergies    Medications:   Home Medications:    Current Outpatient Medications:     aspirin 81 MG EC tablet, Take 1 tablet by mouth Every Other Day. TO HOLD 1 WEEK BEFORE SURGERY, Disp: , Rfl:     atorvastatin (LIPITOR) 40 MG tablet, TAKE ONE TABLET BY MOUTH EVERY EVENING (Patient taking differently: Take 1 tablet by mouth Every Night. TAKE ONE TABLET BY MOUTH EVERY EVENING), Disp: 90 tablet, Rfl: 3    azelastine (ASTELIN) 0.1 % nasal spray, 2 sprays into the nostril(s) as directed by provider 2 (Two) Times a Day. As needed for nasal allergies (Patient taking differently: 2 sprays into the nostril(s) as directed by provider 2 (Two) Times a Day As Needed. As needed for nasal allergies), Disp: 1 each, Rfl: 12    B Complex-C (SUPER B COMPLEX/VITAMIN C) tablet, Take 1 tablet by mouth Daily., Disp: , Rfl:     buPROPion SR (WELLBUTRIN SR) 150 MG 12 hr tablet, TAKE ONE TABLET BY MOUTH DAILY FOR DEPRESSION (Patient taking differently: Take 1 tablet by mouth Every Morning.), Disp: 90 tablet, Rfl: 1    Chlorhexidine Gluconate 2 % pads, Apply  topically. As directed pre op, Disp: , Rfl:     Cholecalciferol (VITAMIN D3) 2000 units capsule, Two OTC caps  QDay to supplement Vitamin D (Patient taking differently: Take 2 capsules by mouth Daily. Two OTC caps  QDay to supplement Vitamin D),  Disp: 60 capsule, Rfl: prn    coenzyme Q10 100 MG capsule, Take 1 capsule by mouth Daily., Disp: , Rfl:     esomeprazole (nexIUM) 20 MG capsule, Take 1 capsule by mouth Every Night., Disp: , Rfl:     fluticasone (FLONASE) 50 MCG/ACT nasal spray, SPRAY TWO SPRAYS IN EACH NOSTRIL ONCE DAILY (Patient taking differently: 2 sprays into the nostril(s) as directed by provider Daily As Needed.), Disp: 16 mL, Rfl: 5    ipratropium (ATROVENT) 0.06 % nasal spray, 2 sprays into the nostril(s) as directed by provider 4 (Four) Times a Day. 2 sprays each nostril every night at bedtime, Disp: 15 mL, Rfl: 5    loratadine-pseudoephedrine (Claritin-D 12 Hour) 5-120 MG per 12 hr tablet, Take 1 tablet by mouth Daily. As needed (Patient taking differently: Take 1 tablet by mouth Daily As Needed. As needed), Disp: 30 tablet, Rfl: 3    losartan (COZAAR) 25 MG tablet, TAKE ONE TABLET BY MOUTH DAILY FOR BLOOD PRESSURE (Patient taking differently: Take 1 tablet by mouth Daily.), Disp: 90 tablet, Rfl: 1    Omega-3 Fatty Acids (FISH OIL) 1200 MG capsule capsule, Take 1 capsule by mouth Daily. TO HOLD 1 WEEK BEFORE SURGERY, Disp: , Rfl:     Semaglutide, 2 MG/DOSE, (Ozempic, 2 MG/DOSE,) 8 MG/3ML solution pen-injector, Inject 2 mg under the skin into the appropriate area as directed 1 (One) Time Per Week. (Patient taking differently: Inject 2 mg under the skin into the appropriate area as directed 1 (One) Time Per Week. SUNDAYS.  HOLD 1 WEEK BEFORE SURGERY), Disp: 9 mL, Rfl: 1    sildenafil (VIAGRA) 100 MG tablet, TAKE ONE-HALF TO ONE TABLET BY MOUTH 1 HOUR PRIOR TO INTIMACY AS NEEDED DAILY (Patient taking differently: Take 1 tablet by mouth As Needed. TAKE ONE-HALF TO ONE TABLET BY MOUTH 1 HOUR PRIOR TO INTIMACY AS NEEDED DAILY.  HOLD 3 TO 5 DAYS BEFORE SURGERY), Disp: 9 tablet, Rfl: 5    Past Medical History:   Diagnosis Date    Arthritis     Back pain     Basal cell carcinoma     Colon polyps 05/04/2023    HISTORY    Depression     Diabetes  "mellitus     ED (erectile dysfunction)     Enlarged prostate     GERD (gastroesophageal reflux disease)     Hyperlipidemia     Numbness and tingling in hands     RESOLVED    Numbness and tingling of left leg     RESOLVED    Torn meniscus     LEFT          Past Surgical History:   Procedure Laterality Date    CARPAL TUNNEL RELEASE Bilateral     COLONOSCOPY      COLONOSCOPY N/A 05/05/2023    Procedure: COLONOSCOPY to cecum and TI with hot and cold snare polypectomies;  Surgeon: Frantz Singh MD;  Location: [facility] ENDOSCOPY;  Service: Gastroenterology;  Laterality: N/A;  PRE- hx of polyps  POST- polyps, diverticulosis    TONSILLECTOMY            Social History     Occupational History    Not on file   Tobacco Use    Smoking status: Former     Passive exposure: Past    Smokeless tobacco: Never    Tobacco comments:     QUIT 1975   Vaping Use    Vaping Use: Never used   Substance and Sexual Activity    Alcohol use: Yes     Comment: social    Drug use: No    Sexual activity: Defer      Social History     Social History Narrative    Not on file          Family History   Problem Relation Age of Onset    Hyperlipidemia Mother     Cancer Mother     Hyperlipidemia Father     Cancer Father     Cancer Brother     Melanoma Brother     Malig Hyperthermia Neg Hx        Review of Systems:  A 14-point review of systems is reviewed with the patient.  Pertinent positives are listed above.  All others are negative.    Physical Exam: 73 y.o. male    Vitals:    09/15/23 1249   Temp: 97.8 °F (36.6 °C)   Weight: 85.8 kg (189 lb 3.2 oz)   Height: 175.3 cm (69\")       General:  Patient is awake and alert.  Appears in no acute distress or discomfort.    Psych:  Affect and demeanor are appropriate.    Eyes:  Conjunctivae and sclerae; appear grossly normal.  Eyes track well and EOM seems to be intact.    Ears:  No gross abnormalities.  Hearing adequate for the exam.    Cardiovascular:  Regular rate and rhythm.    Lungs:  Good chest " expansion.  Breathing unlabored.    Lymph:  No palpable adenopathy about neck or axillae.    Left lower extremity:  Skin benign and intact without evidence for swelling, masses or atrophy.  No palpable masses.  Focal tenderness noted over medial joint line.  ROM is from 0-120°.   Knee is stable on exam.  Good strength throughout the lower leg and foot.  Intact sensation throughout.  Palpable pedal pulses with brisk cap refill.    Diagnostic Tests:  Lab Results   Component Value Date    GLUCOSE 104 (H) 09/13/2023    CALCIUM 9.4 09/13/2023     09/13/2023    K 4.2 09/13/2023    CO2 25.0 09/13/2023     09/13/2023    BUN 13 09/13/2023    CREATININE 0.80 09/13/2023    EGFRIFAFRI 98 01/28/2022    EGFRIFNONA 85 01/28/2022    BCR 16.3 09/13/2023    ANIONGAP 10.0 09/13/2023     Lab Results   Component Value Date    WBC 7.23 09/13/2023    HGB 15.9 09/13/2023    HCT 47.6 09/13/2023    MCV 91.2 09/13/2023     09/13/2023     No results found for: INR, PROTIME    Imaging:  AP, merchant and lateral views of the left knee are ordered and reviewed along with a full length alignment x-ray.  These x-rays were taken to evaluate his complaint and presurgical planning.  These compared to previous x-rays.  The x-rays show advanced medial compartment osteoarthritis with joint space narrowing, osteophyte formation, and subchondral sclerosis.  The long leg alignment x-ray shows mild varus malalignment with the weightbearing axis passing through medial compartment.     Assessment:  Left knee medial compartment osteoarthritis with medial meniscal tear    Plan: We will plan on proceeding with a left total knee arthroplasty at the patient's request.  I reviewed details of procedure with patient today and discussed all the risks, benefits, alternatives, and limitations of the procedure in laymen's terms with the risks including but not limited to:  neurovascular damage resulting in permanent dysfunction or footdrop and potential  need for further surgery, bleeding, infection, hematoma, chronic pain, worsening of pain, persistent symptoms potentially necessitating revision, prosthesis-related problems including loosening or allergy, swelling, loss of motion and arthrofibrosis, weakness, stiffness, instability, DVT, pulmonary embolus, death, stroke, complex regional pain syndrome, and need for additional procedures.  Patient verbalized understanding, and was given the opportunity to ask and have all questions answered today.  No guarantees were given regarding results of surgery.      Patient is planning for hospital dismissal same day of surgery.  Denies history of DVT or metal allergy.    Jaki Stevens, APRN    09/14/23

## 2023-09-15 NOTE — PATIENT INSTRUCTIONS
Discharge instructions continue healthy diet and exercise you are doing spectacular  Read the prediabetes handout given you today  Continue your journey, challenge you  10 to 20 pounds over the next year  Slow steady progress debrided better feel better walk better,  Improvement with your arthritis your knee,    Calories should be around 1800 daily  Intermittent fasting  Diabetic teaching to reverse insulin resistance and to prevent diabetes and for healthy eating plan.  As long as you are doing well follow-up in 6 months should you decreased 10 pounds or more likely will need to look at your blood pressure medicine consider holding or decreasing?

## 2023-09-28 ENCOUNTER — APPOINTMENT (OUTPATIENT)
Dept: GENERAL RADIOLOGY | Facility: HOSPITAL | Age: 73
End: 2023-09-28
Payer: MEDICARE

## 2023-09-28 ENCOUNTER — HOSPITAL ENCOUNTER (OUTPATIENT)
Facility: HOSPITAL | Age: 73
Setting detail: HOSPITAL OUTPATIENT SURGERY
Discharge: HOME | End: 2023-09-28
Attending: ORTHOPAEDIC SURGERY | Admitting: ORTHOPAEDIC SURGERY
Payer: MEDICARE

## 2023-09-28 ENCOUNTER — HOME HEALTH ADMISSION (OUTPATIENT)
Dept: HOME HEALTH SERVICES | Facility: HOME HEALTHCARE | Age: 73
End: 2023-09-28
Payer: MEDICARE

## 2023-09-28 ENCOUNTER — ANESTHESIA (OUTPATIENT)
Dept: PERIOP | Facility: HOSPITAL | Age: 73
End: 2023-09-28
Payer: MEDICARE

## 2023-09-28 ENCOUNTER — DOCUMENTATION (OUTPATIENT)
Dept: HOME HEALTH SERVICES | Facility: HOME HEALTHCARE | Age: 73
End: 2023-09-28
Payer: MEDICARE

## 2023-09-28 ENCOUNTER — ANESTHESIA EVENT (OUTPATIENT)
Dept: PERIOP | Facility: HOSPITAL | Age: 73
End: 2023-09-28
Payer: MEDICARE

## 2023-09-28 VITALS
OXYGEN SATURATION: 95 % | HEART RATE: 89 BPM | DIASTOLIC BLOOD PRESSURE: 72 MMHG | RESPIRATION RATE: 16 BRPM | TEMPERATURE: 97.4 F | SYSTOLIC BLOOD PRESSURE: 111 MMHG

## 2023-09-28 DIAGNOSIS — Z96.652 HISTORY OF TOTAL LEFT KNEE REPLACEMENT: Primary | ICD-10-CM

## 2023-09-28 LAB
GLUCOSE BLDC GLUCOMTR-MCNC: 114 MG/DL (ref 70–130)
GLUCOSE BLDC GLUCOMTR-MCNC: 98 MG/DL (ref 70–130)

## 2023-09-28 PROCEDURE — 27447 TOTAL KNEE ARTHROPLASTY: CPT | Performed by: ORTHOPAEDIC SURGERY

## 2023-09-28 PROCEDURE — 25010000002 HYDROMORPHONE PER 4 MG: Performed by: NURSE ANESTHETIST, CERTIFIED REGISTERED

## 2023-09-28 PROCEDURE — 25010000002 MAGNESIUM SULFATE PER 500 MG OF MAGNESIUM: Performed by: NURSE ANESTHETIST, CERTIFIED REGISTERED

## 2023-09-28 PROCEDURE — 25010000002 FENTANYL CITRATE (PF) 50 MCG/ML SOLUTION: Performed by: NURSE ANESTHETIST, CERTIFIED REGISTERED

## 2023-09-28 PROCEDURE — C1713 ANCHOR/SCREW BN/BN,TIS/BN: HCPCS | Performed by: ORTHOPAEDIC SURGERY

## 2023-09-28 PROCEDURE — 25010000002 ONDANSETRON PER 1 MG: Performed by: NURSE ANESTHETIST, CERTIFIED REGISTERED

## 2023-09-28 PROCEDURE — 25010000002 MORPHINE PER 10 MG: Performed by: ORTHOPAEDIC SURGERY

## 2023-09-28 PROCEDURE — 25010000002 MIDAZOLAM PER 1 MG: Performed by: ANESTHESIOLOGY

## 2023-09-28 PROCEDURE — 82948 REAGENT STRIP/BLOOD GLUCOSE: CPT

## 2023-09-28 PROCEDURE — 25010000002 FENTANYL CITRATE (PF) 50 MCG/ML SOLUTION: Performed by: ANESTHESIOLOGY

## 2023-09-28 PROCEDURE — 25010000002 DEXAMETHASONE PER 1 MG: Performed by: ANESTHESIOLOGY

## 2023-09-28 PROCEDURE — 73560 X-RAY EXAM OF KNEE 1 OR 2: CPT

## 2023-09-28 PROCEDURE — 25010000002 ROPIVACAINE PER 1 MG: Performed by: ANESTHESIOLOGY

## 2023-09-28 PROCEDURE — 25010000002 CEFAZOLIN IN DEXTROSE 2-4 GM/100ML-% SOLUTION: Performed by: ORTHOPAEDIC SURGERY

## 2023-09-28 PROCEDURE — 97530 THERAPEUTIC ACTIVITIES: CPT

## 2023-09-28 PROCEDURE — C1776 JOINT DEVICE (IMPLANTABLE): HCPCS | Performed by: ORTHOPAEDIC SURGERY

## 2023-09-28 PROCEDURE — 97162 PT EVAL MOD COMPLEX 30 MIN: CPT

## 2023-09-28 PROCEDURE — 25010000002 PROPOFOL 10 MG/ML EMULSION: Performed by: NURSE ANESTHETIST, CERTIFIED REGISTERED

## 2023-09-28 PROCEDURE — 25010000002 KETOROLAC TROMETHAMINE PER 15 MG: Performed by: ORTHOPAEDIC SURGERY

## 2023-09-28 PROCEDURE — 25010000002 EPINEPHRINE 1 MG/ML SOLUTION 30 ML VIAL: Performed by: ORTHOPAEDIC SURGERY

## 2023-09-28 PROCEDURE — 25010000002 ROPIVACAINE PER 1 MG: Performed by: ORTHOPAEDIC SURGERY

## 2023-09-28 PROCEDURE — 25010000002 DEXAMETHASONE SODIUM PHOSPHATE 20 MG/5ML SOLUTION: Performed by: NURSE ANESTHETIST, CERTIFIED REGISTERED

## 2023-09-28 DEVICE — LEGION CRUCIATE RETAINING HIGH                                    FLEX HIGHLY CROSS LINKED                                    POLYETHYLENE SIZE 5-6 11MM
Type: IMPLANTABLE DEVICE | Site: KNEE | Status: FUNCTIONAL
Brand: LEGION

## 2023-09-28 DEVICE — LEGION CRUCIATE RETAINING OXINIUM                                    FEMORAL SIZE 5 LEFT
Type: IMPLANTABLE DEVICE | Site: KNEE | Status: FUNCTIONAL
Brand: LEGION

## 2023-09-28 DEVICE — IMPLANTABLE DEVICE: Type: IMPLANTABLE DEVICE | Status: FUNCTIONAL

## 2023-09-28 DEVICE — DEV CONTRL TISS STRATAFIX SPIRAL MNCRYL UD 3/0 PLS 30CM: Type: IMPLANTABLE DEVICE | Site: KNEE | Status: FUNCTIONAL

## 2023-09-28 DEVICE — GEN II 7.5MM RESUR PAT 35MM
Type: IMPLANTABLE DEVICE | Site: KNEE | Status: FUNCTIONAL
Brand: GENESIS II

## 2023-09-28 DEVICE — DEV CONTRL TISS STRATAFIX SYMM PDS PLUS VIL CT-1 60CM: Type: IMPLANTABLE DEVICE | Site: KNEE | Status: FUNCTIONAL

## 2023-09-28 DEVICE — PALACOS® R IS A FAST-CURING, RADIOPAQUE, POLY(METHYL METHACRYLATE)-BASED BONE CEMENT.PALACOS ® R CONTAINS THE X-RAY CONTRAST MEDIUM ZIRCONIUM DIOXIDE. TO IMPROVE VISIBILITY IN THE SURGICAL FIELD PALACOS ® R HAS BEEN COLOURED WITH CHLOROPHYLL (E141). THE BONE CEMENT IS PREPARED DIRECTLY BEFORE USE BY MIXING A POLYMER POWDER COMPONENT WITH A LIQUID MONOMER COMPONENT. A DUCTILE DOUGH FORMS WHICH CURES WITHIN A FEW MINUTES.
Type: IMPLANTABLE DEVICE | Site: KNEE | Status: FUNCTIONAL
Brand: PALACOS®

## 2023-09-28 DEVICE — GENESIS II NON-POROUS TIBIAL                                    BASEPLATE SIZE 6 LT
Type: IMPLANTABLE DEVICE | Site: KNEE | Status: FUNCTIONAL
Brand: GENESIS II

## 2023-09-28 RX ORDER — ASPIRIN 81 MG/1
81 TABLET ORAL 2 TIMES DAILY
Status: DISCONTINUED | OUTPATIENT
Start: 2023-09-29 | End: 2023-09-28 | Stop reason: HOSPADM

## 2023-09-28 RX ORDER — PROMETHAZINE HYDROCHLORIDE 25 MG/1
25 SUPPOSITORY RECTAL ONCE AS NEEDED
Status: DISCONTINUED | OUTPATIENT
Start: 2023-09-28 | End: 2023-09-28 | Stop reason: HOSPADM

## 2023-09-28 RX ORDER — DROPERIDOL 2.5 MG/ML
0.62 INJECTION, SOLUTION INTRAMUSCULAR; INTRAVENOUS
Status: DISCONTINUED | OUTPATIENT
Start: 2023-09-28 | End: 2023-09-28 | Stop reason: HOSPADM

## 2023-09-28 RX ORDER — HYDRALAZINE HYDROCHLORIDE 20 MG/ML
5 INJECTION INTRAMUSCULAR; INTRAVENOUS
Status: DISCONTINUED | OUTPATIENT
Start: 2023-09-28 | End: 2023-09-28 | Stop reason: HOSPADM

## 2023-09-28 RX ORDER — KETOROLAC TROMETHAMINE 30 MG/ML
15 INJECTION, SOLUTION INTRAMUSCULAR; INTRAVENOUS ONCE AS NEEDED
Status: DISCONTINUED | OUTPATIENT
Start: 2023-09-28 | End: 2023-09-28 | Stop reason: HOSPADM

## 2023-09-28 RX ORDER — VANCOMYCIN HYDROCHLORIDE 1 G/20ML
1 INJECTION, POWDER, LYOPHILIZED, FOR SOLUTION INTRAVENOUS ONCE
Status: DISCONTINUED | OUTPATIENT
Start: 2023-09-28 | End: 2023-09-28

## 2023-09-28 RX ORDER — ONDANSETRON 4 MG/1
4 TABLET, FILM COATED ORAL EVERY 8 HOURS PRN
Qty: 12 TABLET | Refills: 0 | Status: SHIPPED | OUTPATIENT
Start: 2023-09-28

## 2023-09-28 RX ORDER — DOCUSATE SODIUM 100 MG/1
100 CAPSULE, LIQUID FILLED ORAL 2 TIMES DAILY
Qty: 60 CAPSULE | Refills: 0 | Status: SHIPPED | OUTPATIENT
Start: 2023-09-28

## 2023-09-28 RX ORDER — ONDANSETRON 2 MG/ML
INJECTION INTRAMUSCULAR; INTRAVENOUS AS NEEDED
Status: DISCONTINUED | OUTPATIENT
Start: 2023-09-28 | End: 2023-09-28 | Stop reason: SURG

## 2023-09-28 RX ORDER — ROPIVACAINE HYDROCHLORIDE 5 MG/ML
INJECTION, SOLUTION EPIDURAL; INFILTRATION; PERINEURAL
Status: COMPLETED | OUTPATIENT
Start: 2023-09-28 | End: 2023-09-28

## 2023-09-28 RX ORDER — ONDANSETRON 2 MG/ML
4 INJECTION INTRAMUSCULAR; INTRAVENOUS ONCE AS NEEDED
Status: DISCONTINUED | OUTPATIENT
Start: 2023-09-28 | End: 2023-09-28 | Stop reason: HOSPADM

## 2023-09-28 RX ORDER — ONDANSETRON 4 MG/1
4 TABLET, FILM COATED ORAL ONCE AS NEEDED
Status: DISCONTINUED | OUTPATIENT
Start: 2023-09-28 | End: 2023-09-28 | Stop reason: HOSPADM

## 2023-09-28 RX ORDER — EPHEDRINE SULFATE 50 MG/ML
5 INJECTION, SOLUTION INTRAVENOUS ONCE AS NEEDED
Status: DISCONTINUED | OUTPATIENT
Start: 2023-09-28 | End: 2023-09-28 | Stop reason: HOSPADM

## 2023-09-28 RX ORDER — MAGNESIUM SULFATE HEPTAHYDRATE 500 MG/ML
INJECTION, SOLUTION INTRAMUSCULAR; INTRAVENOUS AS NEEDED
Status: DISCONTINUED | OUTPATIENT
Start: 2023-09-28 | End: 2023-09-28 | Stop reason: SURG

## 2023-09-28 RX ORDER — HYDROCODONE BITARTRATE AND ACETAMINOPHEN 7.5; 325 MG/1; MG/1
1 TABLET ORAL EVERY 4 HOURS PRN
Status: DISCONTINUED | OUTPATIENT
Start: 2023-09-28 | End: 2023-09-28 | Stop reason: HOSPADM

## 2023-09-28 RX ORDER — DIPHENHYDRAMINE HYDROCHLORIDE 50 MG/ML
12.5 INJECTION INTRAMUSCULAR; INTRAVENOUS
Status: DISCONTINUED | OUTPATIENT
Start: 2023-09-28 | End: 2023-09-28 | Stop reason: HOSPADM

## 2023-09-28 RX ORDER — FAMOTIDINE 10 MG/ML
20 INJECTION, SOLUTION INTRAVENOUS ONCE
Status: COMPLETED | OUTPATIENT
Start: 2023-09-28 | End: 2023-09-28

## 2023-09-28 RX ORDER — HYDROMORPHONE HYDROCHLORIDE 1 MG/ML
0.25 INJECTION, SOLUTION INTRAMUSCULAR; INTRAVENOUS; SUBCUTANEOUS
Status: DISCONTINUED | OUTPATIENT
Start: 2023-09-28 | End: 2023-09-28 | Stop reason: HOSPADM

## 2023-09-28 RX ORDER — PROPOFOL 10 MG/ML
VIAL (ML) INTRAVENOUS AS NEEDED
Status: DISCONTINUED | OUTPATIENT
Start: 2023-09-28 | End: 2023-09-28 | Stop reason: SURG

## 2023-09-28 RX ORDER — HYDROCODONE BITARTRATE AND ACETAMINOPHEN 5; 325 MG/1; MG/1
1 TABLET ORAL ONCE AS NEEDED
Status: COMPLETED | OUTPATIENT
Start: 2023-09-28 | End: 2023-09-28

## 2023-09-28 RX ORDER — SODIUM CHLORIDE 0.9 % (FLUSH) 0.9 %
3 SYRINGE (ML) INJECTION EVERY 12 HOURS SCHEDULED
Status: DISCONTINUED | OUTPATIENT
Start: 2023-09-28 | End: 2023-09-28 | Stop reason: HOSPADM

## 2023-09-28 RX ORDER — DEXAMETHASONE SODIUM PHOSPHATE 4 MG/ML
INJECTION, SOLUTION INTRA-ARTICULAR; INTRALESIONAL; INTRAMUSCULAR; INTRAVENOUS; SOFT TISSUE AS NEEDED
Status: DISCONTINUED | OUTPATIENT
Start: 2023-09-28 | End: 2023-09-28 | Stop reason: SURG

## 2023-09-28 RX ORDER — MIDAZOLAM HYDROCHLORIDE 1 MG/ML
0.5 INJECTION INTRAMUSCULAR; INTRAVENOUS
Status: DISCONTINUED | OUTPATIENT
Start: 2023-09-28 | End: 2023-09-28 | Stop reason: HOSPADM

## 2023-09-28 RX ORDER — LIDOCAINE HYDROCHLORIDE 10 MG/ML
0.5 INJECTION, SOLUTION INFILTRATION; PERINEURAL ONCE AS NEEDED
Status: DISCONTINUED | OUTPATIENT
Start: 2023-09-28 | End: 2023-09-28 | Stop reason: HOSPADM

## 2023-09-28 RX ORDER — PROMETHAZINE HYDROCHLORIDE 25 MG/1
25 TABLET ORAL ONCE AS NEEDED
Status: DISCONTINUED | OUTPATIENT
Start: 2023-09-28 | End: 2023-09-28 | Stop reason: HOSPADM

## 2023-09-28 RX ORDER — SODIUM CHLORIDE, SODIUM LACTATE, POTASSIUM CHLORIDE, CALCIUM CHLORIDE 600; 310; 30; 20 MG/100ML; MG/100ML; MG/100ML; MG/100ML
9 INJECTION, SOLUTION INTRAVENOUS CONTINUOUS
Status: DISCONTINUED | OUTPATIENT
Start: 2023-09-28 | End: 2023-09-28 | Stop reason: HOSPADM

## 2023-09-28 RX ORDER — HYDROCODONE BITARTRATE AND ACETAMINOPHEN 10; 325 MG/1; MG/1
1 TABLET ORAL EVERY 4 HOURS PRN
Qty: 42 TABLET | Refills: 0 | Status: SHIPPED | OUTPATIENT
Start: 2023-09-28 | End: 2023-10-06 | Stop reason: SDUPTHER

## 2023-09-28 RX ORDER — FENTANYL CITRATE 50 UG/ML
50 INJECTION, SOLUTION INTRAMUSCULAR; INTRAVENOUS ONCE AS NEEDED
Status: COMPLETED | OUTPATIENT
Start: 2023-09-28 | End: 2023-09-28

## 2023-09-28 RX ORDER — LIDOCAINE HYDROCHLORIDE 20 MG/ML
INJECTION, SOLUTION EPIDURAL; INFILTRATION; INTRACAUDAL; PERINEURAL AS NEEDED
Status: DISCONTINUED | OUTPATIENT
Start: 2023-09-28 | End: 2023-09-28 | Stop reason: SURG

## 2023-09-28 RX ORDER — TRANEXAMIC ACID 100 MG/ML
INJECTION, SOLUTION INTRAVENOUS AS NEEDED
Status: DISCONTINUED | OUTPATIENT
Start: 2023-09-28 | End: 2023-09-28 | Stop reason: SURG

## 2023-09-28 RX ORDER — CEFAZOLIN SODIUM 2 G/100ML
2 INJECTION, SOLUTION INTRAVENOUS EVERY 8 HOURS
Status: DISCONTINUED | OUTPATIENT
Start: 2023-09-28 | End: 2023-09-28 | Stop reason: SDUPTHER

## 2023-09-28 RX ORDER — VANCOMYCIN HYDROCHLORIDE 1 G/200ML
1000 INJECTION, SOLUTION INTRAVENOUS ONCE
Status: DISCONTINUED | OUTPATIENT
Start: 2023-09-28 | End: 2023-09-28 | Stop reason: HOSPADM

## 2023-09-28 RX ORDER — CEFAZOLIN SODIUM 2 G/100ML
2000 INJECTION, SOLUTION INTRAVENOUS EVERY 8 HOURS
Status: DISCONTINUED | OUTPATIENT
Start: 2023-09-28 | End: 2023-09-28 | Stop reason: HOSPADM

## 2023-09-28 RX ORDER — ASPIRIN 81 MG/1
81 TABLET ORAL ONCE
Status: DISCONTINUED | OUTPATIENT
Start: 2023-09-28 | End: 2023-09-28 | Stop reason: HOSPADM

## 2023-09-28 RX ORDER — SODIUM CHLORIDE 0.9 % (FLUSH) 0.9 %
3-10 SYRINGE (ML) INJECTION AS NEEDED
Status: DISCONTINUED | OUTPATIENT
Start: 2023-09-28 | End: 2023-09-28 | Stop reason: HOSPADM

## 2023-09-28 RX ORDER — FENTANYL CITRATE 50 UG/ML
INJECTION, SOLUTION INTRAMUSCULAR; INTRAVENOUS AS NEEDED
Status: DISCONTINUED | OUTPATIENT
Start: 2023-09-28 | End: 2023-09-28 | Stop reason: SURG

## 2023-09-28 RX ORDER — NALOXONE HCL 0.4 MG/ML
0.2 VIAL (ML) INJECTION AS NEEDED
Status: DISCONTINUED | OUTPATIENT
Start: 2023-09-28 | End: 2023-09-28 | Stop reason: HOSPADM

## 2023-09-28 RX ORDER — IPRATROPIUM BROMIDE AND ALBUTEROL SULFATE 2.5; .5 MG/3ML; MG/3ML
3 SOLUTION RESPIRATORY (INHALATION) ONCE AS NEEDED
Status: DISCONTINUED | OUTPATIENT
Start: 2023-09-28 | End: 2023-09-28 | Stop reason: HOSPADM

## 2023-09-28 RX ORDER — FLUMAZENIL 0.1 MG/ML
0.2 INJECTION INTRAVENOUS AS NEEDED
Status: DISCONTINUED | OUTPATIENT
Start: 2023-09-28 | End: 2023-09-28 | Stop reason: HOSPADM

## 2023-09-28 RX ORDER — DEXAMETHASONE SODIUM PHOSPHATE 4 MG/ML
INJECTION, SOLUTION INTRA-ARTICULAR; INTRALESIONAL; INTRAMUSCULAR; INTRAVENOUS; SOFT TISSUE
Status: COMPLETED | OUTPATIENT
Start: 2023-09-28 | End: 2023-09-28

## 2023-09-28 RX ORDER — LABETALOL HYDROCHLORIDE 5 MG/ML
5 INJECTION, SOLUTION INTRAVENOUS
Status: DISCONTINUED | OUTPATIENT
Start: 2023-09-28 | End: 2023-09-28 | Stop reason: HOSPADM

## 2023-09-28 RX ORDER — PHENYLEPHRINE HCL IN 0.9% NACL 1 MG/10 ML
SYRINGE (ML) INTRAVENOUS AS NEEDED
Status: DISCONTINUED | OUTPATIENT
Start: 2023-09-28 | End: 2023-09-28 | Stop reason: SURG

## 2023-09-28 RX ORDER — ASPIRIN 81 MG/1
81 TABLET ORAL 2 TIMES DAILY
Qty: 60 TABLET | Refills: 0 | Status: SHIPPED | OUTPATIENT
Start: 2023-09-28

## 2023-09-28 RX ORDER — ACETAMINOPHEN 325 MG/1
650 TABLET ORAL 2 TIMES DAILY PRN
Qty: 60 TABLET | Refills: 0 | Status: SHIPPED | OUTPATIENT
Start: 2023-09-28

## 2023-09-28 RX ORDER — FENTANYL CITRATE 50 UG/ML
25 INJECTION, SOLUTION INTRAMUSCULAR; INTRAVENOUS
Status: DISCONTINUED | OUTPATIENT
Start: 2023-09-28 | End: 2023-09-28 | Stop reason: HOSPADM

## 2023-09-28 RX ORDER — MAGNESIUM HYDROXIDE 1200 MG/15ML
LIQUID ORAL AS NEEDED
Status: DISCONTINUED | OUTPATIENT
Start: 2023-09-28 | End: 2023-09-28 | Stop reason: HOSPADM

## 2023-09-28 RX ADMIN — Medication 200 MCG: at 08:28

## 2023-09-28 RX ADMIN — HYDROCODONE BITARTRATE AND ACETAMINOPHEN 1 TABLET: 5; 325 TABLET ORAL at 10:41

## 2023-09-28 RX ADMIN — FENTANYL CITRATE 50 MCG: 50 INJECTION, SOLUTION INTRAMUSCULAR; INTRAVENOUS at 08:34

## 2023-09-28 RX ADMIN — TRANEXAMIC ACID 1000 MG: 100 INJECTION INTRAVENOUS at 09:22

## 2023-09-28 RX ADMIN — MAGNESIUM SULFATE HEPTAHYDRATE 2 G: 500 INJECTION, SOLUTION INTRAMUSCULAR; INTRAVENOUS at 08:45

## 2023-09-28 RX ADMIN — SODIUM CHLORIDE, POTASSIUM CHLORIDE, SODIUM LACTATE AND CALCIUM CHLORIDE: 600; 310; 30; 20 INJECTION, SOLUTION INTRAVENOUS at 09:10

## 2023-09-28 RX ADMIN — ROPIVACAINE HYDROCHLORIDE 20 ML: 5 INJECTION EPIDURAL; INFILTRATION; PERINEURAL at 07:45

## 2023-09-28 RX ADMIN — ONDANSETRON 4 MG: 2 INJECTION INTRAMUSCULAR; INTRAVENOUS at 09:31

## 2023-09-28 RX ADMIN — MIDAZOLAM 1 MG: 1 INJECTION INTRAMUSCULAR; INTRAVENOUS at 07:52

## 2023-09-28 RX ADMIN — PROPOFOL 180 MG: 10 INJECTION, EMULSION INTRAVENOUS at 08:09

## 2023-09-28 RX ADMIN — HYDROMORPHONE HYDROCHLORIDE 0.25 MG: 1 INJECTION, SOLUTION INTRAMUSCULAR; INTRAVENOUS; SUBCUTANEOUS at 10:41

## 2023-09-28 RX ADMIN — DEXAMETHASONE SODIUM PHOSPHATE 4 MG: 4 INJECTION, SOLUTION INTRA-ARTICULAR; INTRALESIONAL; INTRAMUSCULAR; INTRAVENOUS; SOFT TISSUE at 07:45

## 2023-09-28 RX ADMIN — LIDOCAINE HYDROCHLORIDE 60 MG: 20 INJECTION, SOLUTION EPIDURAL; INFILTRATION; INTRACAUDAL; PERINEURAL at 08:09

## 2023-09-28 RX ADMIN — SODIUM CHLORIDE, POTASSIUM CHLORIDE, SODIUM LACTATE AND CALCIUM CHLORIDE 9 ML/HR: 600; 310; 30; 20 INJECTION, SOLUTION INTRAVENOUS at 06:57

## 2023-09-28 RX ADMIN — CEFAZOLIN SODIUM 2000 MG: 2 INJECTION, SOLUTION INTRAVENOUS at 08:00

## 2023-09-28 RX ADMIN — FENTANYL CITRATE 50 MCG: 50 INJECTION, SOLUTION INTRAMUSCULAR; INTRAVENOUS at 07:52

## 2023-09-28 RX ADMIN — Medication 200 MCG: at 08:14

## 2023-09-28 RX ADMIN — DEXAMETHASONE SODIUM PHOSPHATE 8 MG: 4 INJECTION, SOLUTION INTRAMUSCULAR; INTRAVENOUS at 08:22

## 2023-09-28 RX ADMIN — FAMOTIDINE 20 MG: 10 INJECTION INTRAVENOUS at 07:46

## 2023-09-28 RX ADMIN — HYDROMORPHONE HYDROCHLORIDE 0.25 MG: 1 INJECTION, SOLUTION INTRAMUSCULAR; INTRAVENOUS; SUBCUTANEOUS at 10:53

## 2023-09-28 NOTE — ANESTHESIA PROCEDURE NOTES
Peripheral Block      Patient reassessed immediately prior to procedure    Patient location during procedure: pre-op  Start time: 9/28/2023 7:45 AM  Stop time: 9/28/2023 7:50 AM  Reason for block: procedure for pain, at surgeon's request and post-op pain management  Performed by  Anesthesiologist: Yehuda Thompson MD  Preanesthetic Checklist  Completed: patient identified, IV checked, site marked, risks and benefits discussed, surgical consent, monitors and equipment checked, pre-op evaluation and timeout performed  Prep:  Pt Position: supine  Sterile barriers:cap, gloves, sterile barriers, washed/disinfected hands and mask  Prep: ChloraPrep  Patient monitoring: blood pressure monitoring, continuous pulse oximetry and EKG  Procedure    Sedation: yes  Performed under: local infiltration  Guidance:ultrasound guided  Images:still images obtained, printed/placed on chart    Laterality:left  Block Type:adductor canal block  Injection Technique:single-shot  Needle Type:echogenic  Resistance on Injection: none    Medications Used: dexamethasone (DECADRON) injection - Injection   4 mg - 9/28/2023 7:45:00 AM  ropivacaine (NAROPIN) 0.5 % injection - Injection   20 mL - 9/28/2023 7:45:00 AM      Post Assessment  Injection Assessment: negative aspiration for heme, no paresthesia on injection and incremental injection  Patient Tolerance:comfortable throughout block  Complications:no  Additional Notes  USG used to verify needle placement and medication administration

## 2023-09-28 NOTE — PLAN OF CARE
Goal Outcome Evaluation:  Plan of Care Reviewed With: patient, spouse      Pt is 72 y/o M admitted to [facility] on 9/28/23 for L TKA, pt is POD0. At baseline pt is indep, from home with spouse, 6 VINH. Pt currently demos CGA for transfers and ambulation up to 75' with rwx. Tolerates 4 step negotiation with instruction for technique. Pt demos mobility below baseline and therefore would benefit from acute skilled PT to address functional mobility deficits. Anticipate d/c home with assist and home PT. PT requires 2ww for safe household mobility at d/c.              Anticipated Discharge Disposition (PT): home with assist, home with home health

## 2023-09-28 NOTE — CASE MANAGEMENT/SOCIAL WORK
Continued Stay Note  [facility]     Patient Name: Juan C Kumar  MRN: [id number]  Today's Date: 9/28/2023    Admit Date: 9/28/2023    Plan: Home with [facility]    Discharge Plan       Plan       Row Name 09/28/23 1515    Plan Home with [facility]     Patient/Family in Agreement with Plan yes    Provided Post Acute Provider List? Yes    Post Acute Provider List Home Health    Provided Post Acute Provider Quality & Resource List? N/A    Delivered To Patient                           Discharge Codes    No documentation.                 Expected Discharge Date and Time       Expected Discharge Date Expected Discharge Time    Sep 28, 2023               Shannon Epley, RN

## 2023-09-28 NOTE — ANESTHESIA PREPROCEDURE EVALUATION
Anesthesia Evaluation     NPO Solid Status: > 8 hours  NPO Liquid Status: > 8 hours           Airway   Mallampati: I  No difficulty expected  Dental      Pulmonary    Cardiovascular   Exercise tolerance: good (4-7 METS)    (+) hypertension, hyperlipidemia      Neuro/Psych  (+) numbness, psychiatric history  GI/Hepatic/Renal/Endo    (+) GERD, diabetes mellitus    Musculoskeletal     (+) back pain  Abdominal    Substance History      OB/GYN          Other   arthritis,   history of cancer                  Anesthesia Plan    ASA 3     general     (Regional for POPC PSR  )  intravenous induction     Anesthetic plan, risks, benefits, and alternatives have been provided, discussed and informed consent has been obtained with: patient.    CODE STATUS:

## 2023-09-28 NOTE — DISCHARGE PLACEMENT REQUEST
"Javed Roger P \"Javed José\" (73 y.o. Male)    YOB: 1950  Social Security Number:   Address: [address] [place] [address]  Home Phone: [phone]  MRN: [id number]  Muslim: Denominational  Marital Status:         Admission Date: 9/28/23  Admission Type: Elective  Admitting Provider: Frank Bland MD  Attending Provider: Frank Bland MD  Department, Room/Bed: [facility] OR, [facility] OR/OSC OR  Discharge Date:   Discharge Disposition:   Discharge Destination:               Attending Provider: Frank Bland MD    Allergies: No Known Allergies    Isolation: None   Infection: None   Code Status: Not on file    Ht: 175.3 cm (69\")   Wt: 86.2 kg (190 lb 1.6 oz)    Admission Cmt: None   Principal Problem: Acute pain of left knee [M25.562]                   Active Insurance as of 9/28/2023       Primary Coverage       Payor Plan Insurance Group Employer/Plan Group    MEDICARE MEDICARE A & B        Payor Plan Address Payor Plan Phone Number Payor Plan Fax Number Effective Dates    [address] [phone]  4/1/2014 - None Entered    [facility] [id number]         Subscriber Name Subscriber Birth Date Member ID       JAVED ROGER P 1950 [id number]               Secondary Coverage       Payor Plan Insurance Group Employer/Plan Group    [facility] [place]PP [id number]       Payor Plan Address Payor Plan Phone Number Payor Plan Fax Number Effective Dates    [address]   1/1/2022 - None Entered    [facility] [id number]         Subscriber Name Subscriber Birth Date Member ID       JAVED ROGER P 1950 [id number]                     Emergency Contacts        (Rel.) Home Phone Work Phone Mobile Phone    Josee Roger (Spouse) None None [phone]                "

## 2023-09-28 NOTE — ANESTHESIA POSTPROCEDURE EVALUATION
Patient: Juan C Kumar    Procedure Summary       Date: 09/28/23 Room / Location: [facility] OSC OR [address] [place] [address] [facility]    Anesthesia Start: 0804 Anesthesia Stop: 1007    Procedure: TOTAL KNEE ARTHROPLASTY (Left: Knee) Diagnosis:       Left knee pain, unspecified chronicity      (Left knee pain, unspecified chronicity [M25.562])    Surgeons: Frank Bland MD Provider: Yehuda Thompson MD    Anesthesia Type: general ASA Status: 3            Anesthesia Type: general    Vitals  Vitals Value Taken Time   /65 09/28/23 1115   Temp 36.3 °C (97.4 °F) 09/28/23 1109   Pulse 92 09/28/23 1115   Resp 20 09/28/23 1115   SpO2 90 % 09/28/23 1115           Post Anesthesia Care and Evaluation    Patient location during evaluation: PACU  Patient participation: complete - patient participated  Level of consciousness: awake  Pain management: adequate    Airway patency: patent  Anesthetic complications: No anesthetic complications  PONV Status: none  Cardiovascular status: acceptable  Respiratory status: acceptable  Hydration status: acceptable

## 2023-09-28 NOTE — THERAPY EVALUATION
Patient Name: Juan C Kumar  : 1950    MRN: [id number]                              Today's Date: 2023       Admit Date: 2023    Visit Dx:     ICD-10-CM ICD-9-CM   1. History of total left knee replacement  Z96.652 V43.65     Patient Active Problem List   Diagnosis    Hyperlipidemia    Gastroesophageal reflux disease    Osteoarthritis of both hands    Health care maintenance    Prostate cancer screening    Left-sided low back pain with left-sided sciatica    IFG (impaired fasting glucose)    Colon cancer screening    ]    Prediabetes    Depression    Grief    Mild major depression    Elevated blood pressure reading without diagnosis of hypertension    Erectile dysfunction    Acute pain of left knee    Osteoarthritis of left knee    Mild essential hypertension    History of adenomatous polyp of colon     Past Medical History:   Diagnosis Date    Allergic     Arthritis     Back pain     Basal cell carcinoma     Colon polyps 2023    HISTORY    Depression     Diabetes mellitus     ED (erectile dysfunction)     Enlarged prostate     GERD (gastroesophageal reflux disease)     Hyperlipidemia     Numbness and tingling in hands     RESOLVED    Numbness and tingling of left leg     RESOLVED    Torn meniscus     LEFT     Past Surgical History:   Procedure Laterality Date    CARPAL TUNNEL RELEASE Bilateral     COLONOSCOPY      COLONOSCOPY N/A 2023    Procedure: COLONOSCOPY to cecum and TI with hot and cold snare polypectomies;  Surgeon: Frantz Singh MD;  Location: [facility];  Service: Gastroenterology;  Laterality: N/A;  PRE- hx of polyps  POST- polyps, diverticulosis    TONSILLECTOMY      UMBILICAL HERNIA REPAIR      VASECTOMY        General Information       Row Name 23 1326          Physical Therapy Time and Intention    Document Type evaluation;therapy note (daily note)  -ST     Mode of Treatment physical therapy  -ST       Row Name 23 1326          General Information     Patient Profile Reviewed yes  -ST     Prior Level of Function independent:  -ST     Existing Precautions/Restrictions fall  -ST     Barriers to Rehab none identified  -[facility] Name 09/28/23 1326          Living Environment    People in Home spouse  -ST       Row Name 09/28/23 1326          Home Main Entrance    Number of Stairs, Main Entrance six  -ST     Stair Railings, Main Entrance railings safe and in good condition  -ST       Row Name 09/28/23 1326          Stairs Within Home, Primary    Number of Stairs, Within Home, Primary none  -ST       Row Name 09/28/23 1326          Cognition    Orientation Status (Cognition) oriented x 3  -ST       Row Name 09/28/23 1326          Safety Issues, Functional Mobility    Impairments Affecting Function (Mobility) endurance/activity tolerance;pain  -ST     Comment, Safety Issues/Impairments (Mobility) gait belt,nonskid socks donned  -ST               User Key  (r) = Recorded By, (t) = Taken By, (c) = Cosigned By      Initials Name Provider Type    Clare Ace PT Physical Therapist                   Mobility       Row Name 09/28/23 1326          Bed Mobility    Comment, (Bed Mobility) UIC upon arrival  -[facility] 09/28/23 1326          Sit-Stand Transfer    Sit-Stand Rogers (Transfers) verbal cues;contact guard  -ST     Assistive Device (Sit-Stand Transfers) walker, front-wheeled  -ST     Comment, (Sit-Stand Transfer) VC for hand placement  -[facility] 09/28/23 1326          Gait/Stairs (Locomotion)    Rogers Level (Gait) verbal cues;contact guard;standby assist  -ST     Assistive Device (Gait) walker, front-wheeled  -ST     Patient was able to Ambulate yes  -ST     Distance in Feet (Gait) 75'  -ST     Deviations/Abnormal Patterns (Gait) left sided deviations;antalgic;gait speed decreased;stride length decreased  -ST     Bilateral Gait Deviations forward flexed posture;heel strike decreased  -ST     Left Sided Gait Deviations  weight shift ability decreased  -ST     [place] Level (Stairs) verbal cues;contact guard  -ST     Handrail Location (Stairs) right side (ascending)  -ST     Number of Steps (Stairs) 4  -ST     Ascending Technique (Stairs) step-to-step  -ST     Descending Technique (Stairs) step-to-step  -ST     Comment, (Gait/Stairs) emerging step through pattern, instructed in step negotiation  -ST       Row Name 09/28/23 1326          Mobility    Extremity Weight-bearing Status left lower extremity  -ST     Left Lower Extremity (Weight-bearing Status) weight-bearing as tolerated (WBAT)  -ST               User Key  (r) = Recorded By, (t) = Taken By, (c) = Cosigned By      Initials Name Provider Type    Clare Ace PT Physical Therapist                   Obj/Interventions       Row Name 09/28/23 1327          Range of Motion Comprehensive    Comment, General Range of Motion L surgical leg impaired  -[facility] 09/28/23 1327          Strength Comprehensive (MMT)    Comment, General Manual Muscle Testing (MMT) Assessment L surgical leg impaired  -[facility] 09/28/23 1327          Motor Skills    Therapeutic Exercise --  TKA Protocol x 10  -ST       Row Name 09/28/23 1327          Balance    Comment, Balance CGA for dynamic balance, no LOB or buckling present  -               User Key  (r) = Recorded By, (t) = Taken By, (c) = Cosigned By      Initials Name Provider Type    Clare Ace PT Physical Therapist                   Goals/Plan    No documentation.                  Clinical Impression       Row Name 09/28/23 1328          Pain    Pain Location - Side/Orientation Left  -ST     Pain Location - knee  -ST     Pre/Posttreatment Pain Comment did not rate pain  -ST     Pain Intervention(s) Repositioned;Cold applied;Ambulation/increased activity  -ST       Row Name 09/28/23 1328          Plan of Care Review    Plan of Care Reviewed With patient;spouse  -     Outcome Evaluation Pt is 74 y/o  M admitted to [facility] on 9/28/23 for L TKA, pt is POD0. At baseline pt is indep, from home with spouse, 6 VINH. Pt currently demos CGA for transfers and ambulation up to 75' with rwx. Tolerates 4 step negotiation with instruction for technique. Pt demos mobility below baseline and therefore would benefit from acute skilled PT to address functional mobility deficits. Anticipate d/c home with assist and home PT. PT requires 2ww for safe household mobility at d/c.  -       Row Name 09/28/23 1328          Therapy Assessment/Plan (PT)    Rehab Potential (PT) good, to achieve stated therapy goals  -ST     Criteria for Skilled Interventions Met (PT) yes  -ST     Therapy Frequency (PT) daily  -ST     Predicted Duration of Therapy Intervention (PT) 1 day  -ST       Row Name 09/28/23 1328          Positioning and Restraints    Pre-Treatment Position sitting in chair/recliner  -ST     Post Treatment Position chair  -ST     In Chair notified nsg;reclined;call light within reach;encouraged to call for assist;with family/caregiver;with nsg  -ST               User Key  (r) = Recorded By, (t) = Taken By, (c) = Cosigned By      Initials Name Provider Type    Clare Ace, PT Physical Therapist                   Outcome Measures       Row Name 09/28/23 1206          5 Meter Walk    Walk 1 15 seconds  -SK       Row Name 09/28/23 1328          How much help from another person do you currently need...    Turning from your back to your side while in flat bed without using bedrails? 3  -ST     Moving from lying on back to sitting on the side of a flat bed without bedrails? 3  -ST     Moving to and from a bed to a chair (including a wheelchair)? 3  -ST     Standing up from a chair using your arms (e.g., wheelchair, bedside chair)? 3  -ST     Climbing 3-5 steps with a railing? 3  -ST     To walk in hospital room? 3  -ST     AM-PAC 6 Clicks Score (PT) 18  -ST     Highest level of mobility 6 --> Walked 10 steps or more  -       [place]  Name 09/28/23 1328          Functional Assessment    Outcome Measure Options AM-PAC 6 Clicks Basic Mobility (PT)  -ST               User Key  (r) = Recorded By, (t) = Taken By, (c) = Cosigned By      Initials Name Provider Type    Liz Mcwilliams, RN Registered Nurse    Clare Ace PT Physical Therapist                                   PT Recommendation and Plan     Plan of Care Reviewed With: patient, spouse  Outcome Evaluation: Pt is 72 y/o M admitted to [facility] on 9/28/23 for L TKA, pt is POD0. At baseline pt is indep, from home with spouse, 6 VINH. Pt currently demos CGA for transfers and ambulation up to 75' with rwx. Tolerates 4 step negotiation with instruction for technique. Pt demos mobility below baseline and therefore would benefit from acute skilled PT to address functional mobility deficits. Anticipate d/c home with assist and home PT. PT requires 2ww for safe household mobility at d/c.     Time Calculation:         PT Charges       Row Name 09/28/23 1330             Time Calculation    Start Time 1300  -ST      Stop Time 1323  -ST      Time Calculation (min) 23 min  -ST      PT Received On 09/28/23  -ST      PT - Next Appointment 09/29/23  -ST      PT Goal Re-Cert Due Date 09/29/23  -ST         Time Calculation- PT    Total Timed Code Minutes- PT 13 minute(s)  -ST         Timed Charges    58843 - PT Therapeutic Exercise Minutes 3  -ST      00618 - PT Therapeutic Activity Minutes 10  -ST         Total Minutes    Timed Charges Total Minutes 13  -ST       Total Minutes 13  -ST                User Key  (r) = Recorded By, (t) = Taken By, (c) = Cosigned By      Initials Name Provider Type    Clare Ace PT Physical Therapist                  Therapy Charges for Today       Code Description Service Date Service Provider Modifiers Qty    90547418472 HC PT THERAPEUTIC ACT EA 15 MIN 9/28/2023 Clare Wong PT GP 1    68652159573 HC PT EVAL MOD COMPLEXITY 2 9/28/2023 Marvin  Clare, PT GP 1            PT G-Codes  Outcome Measure Options: AM-PAC 6 Clicks Basic Mobility (PT)  AM-PAC 6 Clicks Score (PT): 18  PT Discharge Summary  Anticipated Discharge Disposition (PT): home with assist, home with home health    Clare Wong, PT  9/28/2023

## 2023-09-28 NOTE — PROGRESS NOTES
[facility] to provide home PT, spoke with wife and she verified the info via phone.  Noted referral in epic, D/Cing home today after surgery.  Best number to use to schedule is wife, Josee's at [phone].  Denies any history of home health.

## 2023-09-28 NOTE — ANESTHESIA PROCEDURE NOTES
Airway  Urgency: elective    Date/Time: 9/28/2023 8:11 AM  Airway not difficult    General Information and Staff    Patient location during procedure: OR  Anesthesiologist: Yehuda Thompson MD  CRNA/CAA: Batsheva Salvador CRNA    Indications and Patient Condition  Indications for airway management: airway protection    Preoxygenated: yes  MILS maintained throughout  Mask difficulty assessment: 1 - vent by mask    Final Airway Details  Final airway type: supraglottic airway      Successful airway: classic  Size 4     Number of attempts at approach: 1  Assessment: lips, teeth, and gum same as pre-op    Additional Comments  Pt preoxygenated, sivi, LMA placed with adequate seal and TV

## 2023-09-28 NOTE — OP NOTE
Orthopaedic Operative Note    Facility: [facility]    Patient: Juan C Kumar    Medical Record Number: [id number]    YOB: 1950    Dictating Surgeon: Frank Bland M.D.*    Primary Care Physician: Epley, James, APRN    Date of Operation: 9/28/2023    Pre-Operative Diagnosis:  Left knee end-stage osteoarthritis    Post-Operative Diagnosis:  Left knee end-stage osteoarthritis    Procedure Performed:   Left total knee arthroplasty    Surgeon: Frank Bland MD     Assistant: Stacy Valdez whose assistance was critical for help with patient positioning, suctioning and irrigation, retraction, manipulation of the extremity for insertion of the implants, wound closure and application of the bandages.  Her assistance was critical to the success of this case.      Anesthesia: Regional followed by general.  Local administration of Ortho cocktail solution.    Complications: None.     Estimated Blood Loss: Less than 50 mL.     Implants:     1.  Smith & Nephew size 5 Legion Oxinium femoral component  2.  Smith and Nephew size 6 tibial component with size 11 mm polyethylene liner  3.  Smith & Nephew size 35 by 7.5 mm patellar component    Specimens: * No orders in the log *    Brief Operative Indication:  Mr. Kumar has a history of worsening left knee osteoarthritis which had been refractory to prolonged conservative treatment.  The risk, benefits and alternatives to a total knee arthroplasty were discussed with the patient in detail.  He acknowledged understanding of the information and consented to proceed.    Description of the procedure in detail: The patient and operative site were identified in the preoperative holding area.  The surgical site was marked.  Adequate regional anesthesia of the left lower extremity was administered. He was then taken to the operating room.  Adequate general anesthesia was administered. He was then repositioned on the operating table in the supine position.   A timeout was taken and preoperative antibiotics administered.    The left lower extremity was prepped and draped in the standard, sterile fashion.  I began by cleaning the extremity with an alcohol solution.  A Hibiclens scrub was performed.  The extremity was then prepped with 2 ChloraPreps.  I allowed those to dry for 3 minutes before the draping procedure was carried out.  The leg was exsanguinated with an Esmarch bandage.  The tourniquet was inflated to 250 mmHg.  The leg was positioned at approximately 60 degrees of flexion across the knee in a DeMayo leg positioner.    I fashioned an approximately 8 cm incision anteriorly for a standard medial parapatellar approach.  Full-thickness medial and lateral skin flaps were developed.  The extensor mechanism was carefully exposed.  I performed a medial parapatellar arthrotomy, careful to maintain a cuff of tendinous tissue for later anatomic repair.  The joint was entered.  The infrapatellar fat pad was carefully removed.    Next, the anteromedial soft tissues were carefully elevated off of the anterior face of the tibia.  The MCL was kept protected at all times.  At this point, the joint was inspected.  There was marked arthrosis throughout the joint.  The periarticular osteophytes were carefully removed with a rongeur.    An opening was created in the distal femur.  The wound was irrigated out and then the distal femur alignment nam was inserted down the canal.  A 5 degree valgus distal femoral cutting guide was pinned into position and then the distal femoral cut carried out in the typical fashion.  I inspected and measured to make sure the cut was appropriate.  The cut portion of bone was removed followed by the guide.    Next, the knee was flexed up further to allow for insertion of the posterior referencing guide.  I measured the distal femur.  I determined the appropriate size as referenced off of the posterior condyles.  The femur measured a size 5.  The  guide was positioned, taking care to align this properly and then the anterior, posterior and chamfer cuts were carried out.  The cut portions of bone were then removed.      I then directed my attention to the tibia.  Retractors were positioned to keep the collateral ligaments, PCL and posterior neurovascular structures protected.  The extramedullary guide was positioned.  I took care to align the nam with the anterior face of the tibia.  I made sure that this was parallel and that the guide was centered at the knee and ankle.  I measured and carefully positioned the guide to allow for correction of the preoperative deformity.  I pinned the guide and then checked the alignment one more time with an susie wing.  Once we had the guide in good position and secure, an oscillating saw was used to carry out the proximal tibia cut.  The cut portion of bone was removed and the PCL inspected.  The PCL was intact and stable.  The menisci were removed and the posterior capsule was infiltrated with some of the Ortho cocktail solution.    Next, I measured the proximal tibia cut.  This measured a size 6.  The trial implant was pinned into position, taking care to maintain appropriate rotation.  The proximal tibial preparations were then completed.  I then trialed with a size 5 femur and size 6 tibia.  The knee seemed to be well balanced and demonstrated excellent motion with a size 11 mm trial polyethylene liner.    I then examined the patella.  The patella demonstrated extensive arthrosis.  I determined that resurfacing was indicated.  The patellar preparations were carried out at this time and then I trialed with a size 35 patella.  With this implant in place, the patella tracked well.  A lateral release was deemed unnecessary in this case.    The final preparations were then completed.  The distal femur was prepared and then the trial implants were removed.  The appropriate size implants were opened at this point.  My  assistant mixed the bone cement on the back table using current generation cement mixing technique and a centrifuge.  Once the cement was prepared, cement was applied to the bony surfaces and implants.  The implants were carefully impacted into position.  I made sure that these were fully seated.  The excess, extruded cement was carefully removed with a Sargent elevator.  The knee was taken out into full extension and the trial polyethylene liner inserted.  The patella was then clamped into position.  Again, the excess, extruded cement was removed.  The knee was left in extension with the patella clamped until the cement had fully cured.    While the cement was curing, the periarticular soft tissue structures were carefully infiltrated with the Ortho cocktail solution.  Once the cement had fully cured, I again checked the balancing of the knee.  Again, the knee demonstrated excellent motion and stability with the 11 mm trial liner.  The trial was removed.  The final implant was impacted into position.  I took care to make sure that the dovetails were fully interdigitated.  Again the knee was carried through range of motion.  The patella tracked well and the knee demonstrated excellent motion and stability.    The wound was irrigated with 500 cc of a Betadine containing saline solution.  This was left in place for 3 minutes.  I then irrigated with 3 L of sterile saline via pulsatile lavage.  The tourniquet was deflated.  A gram of transexamic acid was administered.  I made sure that we had good hemostasis.  The parapatellar arthrotomy was anatomically repaired using a PDS Stratafix suture and multiple #1 Vicryl sutures.  The subcutaneous tissues were repaired using 2-0 Vicryl.  A running Stratafix Monocryl suture was used to close the skin followed by a Zipline adhesive.  Sterile dressings were applied.  The drapes were withdrawn. He was awakened and taken to the recovery room in good condition.    Frank Bland,  MD  09/28/23

## 2023-09-28 NOTE — BRIEF OP NOTE
TOTAL KNEE ARTHROPLASTY  Progress Note    Juan C Kumar  9/28/2023    Pre-op Diagnosis:   Left knee pain, unspecified chronicity [M25.562]       Post-Op Diagnosis Codes:     * Left knee pain, unspecified chronicity [M25.562]    Procedure/CPT® Codes:        Procedure(s):  TOTAL KNEE ARTHROPLASTY    Surgical Approach: Knee Medial Parapatellar            Surgeon(s):  Frank Bland MD    Anesthesia: General with Block    Staff:   Circulator: Malu Angulo RN  Scrub Person: Stacy Valdez CSA; Jenise Zurita  Vendor Representative: Angelo Pulliam         Estimated Blood Loss: minimal    Urine Voided: * No values recorded between 9/28/2023  8:03 AM and 9/28/2023  9:23 AM *    Specimens:                None          Drains: * No LDAs found *    Findings: see dictation        Complications: none          Frank Bland MD     Date: 9/28/2023  Time: 09:37 EDT

## 2023-09-29 ENCOUNTER — TELEPHONE (OUTPATIENT)
Dept: ORTHOPEDIC SURGERY | Facility: CLINIC | Age: 73
End: 2023-09-29
Payer: MEDICARE

## 2023-09-29 ENCOUNTER — HOME CARE VISIT (OUTPATIENT)
Dept: HOME HEALTH SERVICES | Facility: HOME HEALTHCARE | Age: 73
End: 2023-09-29
Payer: MEDICARE

## 2023-09-29 VITALS
OXYGEN SATURATION: 94 % | HEART RATE: 104 BPM | SYSTOLIC BLOOD PRESSURE: 116 MMHG | TEMPERATURE: 97.5 F | RESPIRATION RATE: 18 BRPM | DIASTOLIC BLOOD PRESSURE: 66 MMHG

## 2023-09-29 PROCEDURE — G0151 HHCP-SERV OF PT,EA 15 MIN: HCPCS

## 2023-09-29 NOTE — HOME HEALTH
"SOC Note:    Home Health ordered for: disciplines PT 1w1, 3w1, 1w1     Reason for Hosp/Primary Dx/Co-morbidities: aftercare L TKR [id number] Dr Bland; PMHx: HTN, sinus allergies, hyperlipidemia, depression, Type II DM takes Ozempic, GERD    Focus of Care: TKR rehab with skilled PT needed for gait pattern training progressing from rolling walker to cane, stair training, HEP upgrade instruction for ROM/strength left knee, and patient spouse education for medication knowledge, post-op care instructions oncluding GERMAN dressing care, psychosocial knowledge of signs of deprresion as takes Buproprion, pain edema control measures, and home safety, in orde to return to independent household mobility and progress to community mobility for medical appointments including outpatient PT visits when tolerated.    Patient's goal(s): \"I want to rehab my knee back to full strength.\"    Current Functional status/mobility/DME: supervised SBA with rolling walker due to pain, assisted ADL's    HB status/Living Arrangements: lives with spouse who assists as needed    Skin Integrity/wound status: GERMAN dressing intact and pumping without drainage    Code Status: full    Fall Risk/Safety concerns: high due to pain/weakness left knee due to TKR    Medication issues/Concerns: none    Additional Problems/Concerns: none    SDOH Barriers (i.e. caregiver concerns, social isolation, transportation, food insecurity, environment, income etc.)/Need for MSW: none    Plan for next visit: Continue gait pattern training with appropriate device, review supine/sitting TKR booklet exercises increasing reps and adding standing exercises as tolerated, and patient spouse education as needed"

## 2023-09-29 NOTE — TELEPHONE ENCOUNTER
"----- Message from Lizette Rodriguez sent at 9/29/2023  1:55 PM EDT -----  Regarding: Post-op  Contact: [phone]  Please review and advise       ----- Message -----  From: Juan C Kumar \"Juan C Kumar\"  Sent: 9/29/2023   1:50 PM EDT  To: Leland Mcbride [facility] Clinical Pool  Subject: Post-op                                          Sorry I missed your call this morning. I am doing as well as to be expected. Pain blockers are wearing off. Can I alternate between Hydrocodone with Advil?  Also when do I take off the ace bandage? I know if I shower to remove it but do I put it back on and for how long?    Juan C Kumar  [phone]  "

## 2023-09-29 NOTE — TELEPHONE ENCOUNTER
Patient called back because he had missed his call from Jaki GUERIN. He had a few questions and wanted to know when he can take his bandage off and can he alternate between tylenol and ibuprofen. Best call back number is [phone]

## 2023-09-29 NOTE — TELEPHONE ENCOUNTER
I spoke to Mr. Kumar.  I answered all questions to his satisfaction.  I instructed him to avoid anti-inflammatories altogether while he is taking aspirin.  We discussed other postop care instructions.  He verbalized understanding and appreciated the call back.

## 2023-10-02 ENCOUNTER — HOME CARE VISIT (OUTPATIENT)
Dept: HOME HEALTH SERVICES | Facility: HOME HEALTHCARE | Age: 73
End: 2023-10-02
Payer: MEDICARE

## 2023-10-02 ENCOUNTER — TELEPHONE (OUTPATIENT)
Dept: ORTHOPEDIC SURGERY | Facility: HOSPITAL | Age: 73
End: 2023-10-02
Payer: MEDICARE

## 2023-10-02 ENCOUNTER — TELEPHONE (OUTPATIENT)
Dept: ORTHOPEDIC SURGERY | Facility: CLINIC | Age: 73
End: 2023-10-02
Payer: MEDICARE

## 2023-10-02 VITALS
RESPIRATION RATE: 18 BRPM | OXYGEN SATURATION: 93 % | DIASTOLIC BLOOD PRESSURE: 66 MMHG | TEMPERATURE: 95 F | HEART RATE: 113 BPM | SYSTOLIC BLOOD PRESSURE: 102 MMHG

## 2023-10-02 DIAGNOSIS — K59.00 ACUTE CONSTIPATION: Primary | ICD-10-CM

## 2023-10-02 PROCEDURE — G0151 HHCP-SERV OF PT,EA 15 MIN: HCPCS

## 2023-10-02 RX ORDER — BISACODYL 10 MG
10 SUPPOSITORY, RECTAL RECTAL DAILY
Qty: 1 SUPPOSITORY | Refills: 1 | Status: SHIPPED | OUTPATIENT
Start: 2023-10-02

## 2023-10-02 NOTE — TELEPHONE ENCOUNTER
I spoke to Mr. Laboy.  He tells me the hiccups are improving.  He is opted to take a watch and wait approach to this symptom.  Reports he is constipated.  He has been taking the stool softeners as prescribed.  He has taken 1 dose of milk of magnesia yesterday without a bowel movement.  I have given him a prescription for a bisacodyl suppository.  The risk were discussed.  I instructed him to call me back if he is still unable to have a bowel movement after using the suppository.  He verbalized understanding and appreciated the assistance.

## 2023-10-02 NOTE — TELEPHONE ENCOUNTER
"----- Message from Sherice Rodriguez sent at 10/2/2023  8:28 AM EDT -----  Regarding: Post op hiccups   Contact: [phone]  Please review pt message.       ----- Message -----  From: Juan C Kumar \"Juan C Kumar\"  Sent: 9/30/2023  12:26 PM EDT  To: [facility] Clinical Pool  Subject: Post op hiccups                                  I had total knee replacement on Thursday. Since Thursday night, I have had hiccups - they will stop for a few minutes but then resume- all day, all night.   Is there anything I can take or do to make them stop?  "

## 2023-10-02 NOTE — TELEPHONE ENCOUNTER
Post op day 4  Discharge Instructions:  Ask patient about his or her discharge instructions  ?  Patient confirmed understanding   []  Further instruction needed   What, if any, recommendations, teaching, or interventions did you provide? Click or tap here to enter text.  Health status:  Pain controlled Yes   Pain is well controlled with the pain medication as needed.   Recommended interventions:  Yes  incision/dressing status   ?  Clean without redness, drainage, odor  Small spot, unchanging   []  Redness    []  Drainage - color Click or tap here to enter text.  []  Odor  GERMAN - Green light blinking Yes  Difficulties urination No  Last BM 9/29/2023 (if no BM by day 3-recommend OTC suppository or fleets enema)  No BM yet, taking the medication. Options given. Rx called in for suppository   Medications:  ?Medications reviewed with patient/family/caregiver  Patient taking medications as prescribed?   Yes  If not taking medications as prescribed, note specific medicine(s) and reason for each:  Click or tap here to enter text.  Hospital Follow Up Plan:  Follow up Appointment with Orthopedic surgeon:  ?Has f/u appointment                []Scheduled f/u appointment  Home Care ordered at discharge?    Yes        Home Care started, or contact made?    Yes   If no, action taken:   DME obtained/used in home?         Yes   Using IS  Yes   Other information: Mr. Kumar said he feels he is doing about as well as to be expected. He talked with Jaki this morning with some issues he's been having with hiccoughs and constipation. Rx given. He is working with PT and making some progress. Pain is controlled with the pain medication. He still has quite a bit of swelling and bruising. Encouraged ice and elevation. Dressing remains CDI. He does have a small spot of old blood about the size of a dime. Said nothing new has come through. His BP has been running low. He has a call out to his PCP on whether or not he should skip his BP meds for  a few days. He denies dizziness or lightheadedness at this time. Encouraged fluid intake and to switch positions slowly. He voiced understanding. Mr. Kumar doesn't have any other questions for me at this time. He has my contact information should he need anything.

## 2023-10-02 NOTE — HOME HEALTH
"Subjective: \"I'm better today as I was really stiff and painful still yesterday.\"    Falls reported: none    Medication changes: none    Plan for next visit: Continue gait pattern training with rolling walker for symmetrical step thru pattern, HEP review and upgrade adding standing exercises as toelrated, and patient spouse education as needed"

## 2023-10-03 ENCOUNTER — HOME CARE VISIT (OUTPATIENT)
Dept: HOME HEALTH SERVICES | Facility: HOME HEALTHCARE | Age: 73
End: 2023-10-03
Payer: MEDICARE

## 2023-10-03 VITALS
SYSTOLIC BLOOD PRESSURE: 140 MMHG | HEART RATE: 110 BPM | OXYGEN SATURATION: 95 % | DIASTOLIC BLOOD PRESSURE: 80 MMHG | RESPIRATION RATE: 18 BRPM | TEMPERATURE: 96.4 F

## 2023-10-03 DIAGNOSIS — Z96.652 STATUS POST LEFT KNEE REPLACEMENT: Primary | ICD-10-CM

## 2023-10-03 PROCEDURE — G0151 HHCP-SERV OF PT,EA 15 MIN: HCPCS

## 2023-10-03 RX ORDER — BENZONATATE 100 MG/1
100 CAPSULE ORAL 3 TIMES DAILY PRN
Qty: 15 CAPSULE | Refills: 0 | Status: SHIPPED | OUTPATIENT
Start: 2023-10-03

## 2023-10-03 NOTE — TELEPHONE ENCOUNTER
"----- Message from TRUONG Coleman sent at 10/3/2023  1:30 PM EDT -----  Regarding: FW: Post op hiccups   Contact: [phone]  Okay for benzonatate 100 mg 1 capsule by mouth tid as needed qty 15.  Thanks  ----- Message -----  From: Sherice Rodriguez  Sent: 10/3/2023  11:37 AM EDT  To: TRUONG Coleman  Subject: Post op hiccups                                  Please review pt request for rx.       ----- Message -----  From: Juan C Kumar \"Juan C Kumar\"  Sent: 10/3/2023  11:34 AM EDT  To: [facility] Rodríguez [facility]  Subject: Post op hiccups                                  I am still having hiccups.  I am willing to try the cough medicine if you can call in a prescription to [facility] on [facility].  "

## 2023-10-03 NOTE — HOME HEALTH
"Subjective: \"My kneecap area has been really sore since PT yesterday.\"    Falls reported: none    Medication changes: none    Plan for next visit: Continue gait training and begin progressing to cane, stair training with cane/railing, review and upgrade HEP in supine/sitting/standing and resume leg lifts as tolerated depending on patellar pain, and patient education as needed"

## 2023-10-05 ENCOUNTER — HOME CARE VISIT (OUTPATIENT)
Dept: HOME HEALTH SERVICES | Facility: HOME HEALTHCARE | Age: 73
End: 2023-10-05
Payer: MEDICARE

## 2023-10-05 VITALS
HEART RATE: 113 BPM | TEMPERATURE: 96.8 F | RESPIRATION RATE: 18 BRPM | OXYGEN SATURATION: 96 % | SYSTOLIC BLOOD PRESSURE: 122 MMHG | DIASTOLIC BLOOD PRESSURE: 70 MMHG

## 2023-10-05 PROCEDURE — G0151 HHCP-SERV OF PT,EA 15 MIN: HCPCS

## 2023-10-05 NOTE — HOME HEALTH
"Subjective: \"I've been elevating it like you said so the swelling is alot better today,\"    Falls reported: none    Medication changes: benzonatate (Tessalon Perles) 100 MG capsule Take 1 capsule by mouth 3 (Three) Times a Day As Needed for Cough/hiccups    Plan for next visit: discharge assessment and insturctions, final HEP training, gait training progression to cane, stair training, and patient spouse education as needed"

## 2023-10-06 ENCOUNTER — TELEPHONE (OUTPATIENT)
Dept: ORTHOPEDIC SURGERY | Facility: CLINIC | Age: 73
End: 2023-10-06
Payer: MEDICARE

## 2023-10-06 DIAGNOSIS — Z96.652 HISTORY OF TOTAL LEFT KNEE REPLACEMENT: ICD-10-CM

## 2023-10-06 DIAGNOSIS — Z96.652 STATUS POST LEFT KNEE REPLACEMENT: Primary | ICD-10-CM

## 2023-10-06 RX ORDER — HYDROCODONE BITARTRATE AND ACETAMINOPHEN 10; 325 MG/1; MG/1
1 TABLET ORAL EVERY 4 HOURS PRN
Qty: 42 TABLET | Refills: 0 | Status: SHIPPED | OUTPATIENT
Start: 2023-10-06

## 2023-10-06 NOTE — TELEPHONE ENCOUNTER
"----- Message from Sherice Rodriguez sent at 10/6/2023  9:12 AM EDT -----  Regarding: Med refill   Contact: [phone]  Refill request sent to [facility].   Pt requesting outpatient Pt order put in, requesting \"Orthopedic and Sports physical therapy. Therapist: Matthew De Jesus.  Phone [phone].     Fax \"      ----- Message -----  From: Juan C Kumar \"Juan C Kumar\"  Sent: 10/6/2023   9:01 AM EDT  To: [facility] Clinical Pool  Subject: Med refill                                       Jaki, I submitted a request for pain med refill on the med refill site.  Also can you take a look at the message that I sent you regarding sending an order to my preferred therapist for physical therapy?  Thank you  "

## 2023-10-06 NOTE — TELEPHONE ENCOUNTER
I have entered the PT referral.  Please inform patient PT referral with protocol will be faxed today per his request.  Thanks

## 2023-10-09 ENCOUNTER — HOME CARE VISIT (OUTPATIENT)
Dept: HOME HEALTH SERVICES | Facility: HOME HEALTHCARE | Age: 73
End: 2023-10-09
Payer: MEDICARE

## 2023-10-09 ENCOUNTER — TELEPHONE (OUTPATIENT)
Dept: ORTHOPEDIC SURGERY | Facility: HOSPITAL | Age: 73
End: 2023-10-09
Payer: MEDICARE

## 2023-10-09 VITALS
DIASTOLIC BLOOD PRESSURE: 76 MMHG | SYSTOLIC BLOOD PRESSURE: 124 MMHG | HEART RATE: 105 BPM | OXYGEN SATURATION: 96 % | RESPIRATION RATE: 18 BRPM | TEMPERATURE: 96 F

## 2023-10-09 PROCEDURE — G0151 HHCP-SERV OF PT,EA 15 MIN: HCPCS

## 2023-10-09 RX ORDER — LOSARTAN POTASSIUM 25 MG/1
TABLET ORAL
Qty: 90 TABLET | Refills: 1 | Status: SHIPPED | OUTPATIENT
Start: 2023-10-09

## 2023-10-09 RX ORDER — SEMAGLUTIDE 2.68 MG/ML
2 INJECTION, SOLUTION SUBCUTANEOUS WEEKLY
Qty: 9 ML | Refills: 1 | Status: SHIPPED | OUTPATIENT
Start: 2023-10-09

## 2023-10-09 NOTE — TELEPHONE ENCOUNTER
Called and spoke with Mr. Kumar to see how he is doing as he is 2 weeks SP LTK. He said he is doing well. He graduated HH PT today and starts with OP PT Wednesday. He still has quite a bit of pain with PT. He was hoping to be off the pain medication but hasn't been able to wean off them yet. He does have a F/U appointment on Wednesday as well. The hiccoughs are much better. BM's are good now. BP has evened out and is back to normal. Mr. Kumar doesn't have any questions for me at this time. He has my contact information should he need anything.

## 2023-10-09 NOTE — TELEPHONE ENCOUNTER
"  Caller: Juan C Kumar \"Juan C Kumar\"    Relationship: Self    Best call back number: [phone]    Requested Prescriptions:   Requested Prescriptions     Pending Prescriptions Disp Refills    Semaglutide, 2 MG/DOSE, (Ozempic, 2 MG/DOSE,) 8 MG/3ML solution pen-injector 9 mL 1     Sig: Inject 2 mg under the skin into the appropriate area as directed 1 (One) Time Per Week. Indications: Type 2 Diabetes        Pharmacy where request should be sent: [facility] - [address] [phone] [facility] [phone] FX     Last office visit with prescribing clinician: 9/15/2023   Last telemedicine visit with prescribing clinician: Visit date not found   Next office visit with prescribing clinician: Visit date not found     Additional details provided by patient: HAS A MONTH LEFT    Does the patient have less than a 3 day supply:  [x] Yes  [] No    Would you like a call back once the refill request has been completed: [] Yes [x] No    If the office needs to give you a call back, can they leave a voicemail: [] Yes [x] No    Jaylyn Hays Rep   10/09/23 09:08 EDT         "

## 2023-10-09 NOTE — Clinical Note
This is to inform your office of home PT/agency discharge today on [id number] with functional mobility goals met and will continue as outpatient (at facility of his choice on [id number]) for TKR rehab for continued ROM/strengthening left knee as knee flexion has been limited by end range pain and tightness. See final home PT note in EPIC for details.

## 2023-10-11 ENCOUNTER — OFFICE VISIT (OUTPATIENT)
Dept: ORTHOPEDIC SURGERY | Facility: CLINIC | Age: 73
End: 2023-10-11
Payer: MEDICARE

## 2023-10-11 VITALS — BODY MASS INDEX: 27.48 KG/M2 | WEIGHT: 181.3 LBS | HEIGHT: 68 IN | TEMPERATURE: 98.3 F

## 2023-10-11 DIAGNOSIS — Z96.652 STATUS POST LEFT KNEE REPLACEMENT: Primary | ICD-10-CM

## 2023-10-11 RX ORDER — OXYCODONE AND ACETAMINOPHEN 7.5; 325 MG/1; MG/1
1 TABLET ORAL EVERY 4 HOURS PRN
Qty: 50 TABLET | Refills: 0 | Status: SHIPPED | OUTPATIENT
Start: 2023-10-11

## 2023-10-11 NOTE — PROGRESS NOTES
Juan C Kumar     : 1950     MRN: [id number]     DATE: 10/11/2023    DIAGNOSIS: Follow-up 2 weeks status post total knee arthroplasty    SUBJECTIVE:  Patient returns today for 2 week follow up of recent knee replacement. Patient reports pain is poorly controlled with hydrocodone.  PT is going OK.  He reports compliance with the anticoagulation.    OBJECTIVE:     Exam: The incision is healing appropriately.  No sign of infection.  Range of motion is progressing as expected.  The calf is soft and nontender with a negative Homans sign.    DIAGNOSTIC STUDIES     Xrays: AP and lateral views of the left knee were ordered and reviewed for evaluation of recent knee replacement. They demonstrate a well positioned, well aligned knee replacement without complicating factors noted. In comparison with previous films there has been interval implant placement.    ASSESSMENT: 2 week status post left knee replacement.    PLAN:   1) Continue PT   2) Continue to ice as needed.   3) Continue anticoagulation as discussed   4) Follow-up in 6 weeks    5)  I agreed to switch him to percocet.  Risks were discussed.    Frank Bland MD    10/11/2023

## 2023-11-08 ENCOUNTER — OFFICE VISIT (OUTPATIENT)
Dept: ORTHOPEDIC SURGERY | Facility: CLINIC | Age: 73
End: 2023-11-08
Payer: MEDICARE

## 2023-11-08 VITALS — WEIGHT: 178.3 LBS | HEIGHT: 69 IN | BODY MASS INDEX: 26.41 KG/M2 | TEMPERATURE: 98.3 F

## 2023-11-08 DIAGNOSIS — Z96.652 STATUS POST LEFT KNEE REPLACEMENT: Primary | ICD-10-CM

## 2023-11-08 NOTE — PROGRESS NOTES
Juan C Kumar : 1950 MRN: [id number] DATE: 2023    DIAGNOSIS: 6 week follow up left TKA      SUBJECTIVE:  Patient returns today for 6 week follow up of left total knee replacement.  Patient reports he continues to have some discomfort in the hamstring region, but overall this is much improved.  Denies any new issues or concerns.    Vitals:    23 1027   Temp: 98.3 °F (36.8 °C)       OBJECTIVE:     Exam:  The incision is well healed. No sign of infection. Range of motion is measured at 0-100°.  The calf is soft and nontender with a negative Homans sign.  Gait is reciprocal heel-to-toe and only mildly antalgic.    DIAGNOSTIC STUDIES    Xrays: 3 views of the left knee (AP, lateral, and sunrise) were ordered and reviewed for evaluation of recent knee replacement. They demonstrate a well positioned, well aligned knee replacement without complicating factors noted. In comparison with previous films there has been no change.    ASSESSMENT:  6 week status post left knee replacement.    PLAN:   1) Continue with PT exercises as prescribed  2) May discontinue DVT prophylaxis.  3) We discussed the need for prophylactic antibiotics prior to dental appointments.   2) Follow up in 6 weeks with Dr. Baldo Stevens, TRUONG  2023    Answers submitted by the patient for this visit:  Primary Reason for Visit (Submitted on 2023)  What is the primary reason for your visit?: Other  Other (Submitted on 2023)  Please describe your symptoms.: Post op followup, Leg pain  Have you had these symptoms before?: Yes  How long have you been having these symptoms?: Greater than 2 weeks  Please list any medications you are currently taking for this condition.: Tylenol

## 2023-11-15 RX ORDER — SILDENAFIL 100 MG/1
TABLET, FILM COATED ORAL
Qty: 9 TABLET | Refills: 5 | Status: SHIPPED | OUTPATIENT
Start: 2023-11-15

## 2023-11-15 NOTE — TELEPHONE ENCOUNTER
Rx Refill Note  Requested Prescriptions     Pending Prescriptions Disp Refills    sildenafil (VIAGRA) 100 MG tablet [Pharmacy Med Name: SILDENAFIL 100 MG TABLET] 9 tablet 5     Sig: TAKE 1/2 TO 1 TABLET BY MOUTH DAILY ONE HOUR PRIOR TO INTIMACY AS NEEDED      Last office visit with prescribing clinician: 9/15/2023   Last telemedicine visit with prescribing clinician: Visit date not found   Next office visit with prescribing clinician: Visit date not found                         Would you like a call back once the refill request has been completed: [] Yes [] No    If the office needs to give you a call back, can they leave a voicemail: [] Yes [] No    Jaylyn Kellogg Rep  11/15/23, 15:57 EST

## 2023-11-29 ENCOUNTER — TELEPHONE (OUTPATIENT)
Dept: FAMILY MEDICINE CLINIC | Facility: CLINIC | Age: 73
End: 2023-11-29

## 2023-11-29 NOTE — TELEPHONE ENCOUNTER
"Hub staff attempted to follow warm transfer process and was unsuccessful     Caller: Juan C Kumar \"Juan C Kumar\"    Relationship to patient: Self    Best call back number: [phone]*    Patient is needing: SCHEDULED ASAP REGARDING A SKIN INFECTION. THE PATIENT RECENTLY HAD A KNEE REPLACEMENT AND WANTS TO BE SEEN ASAP. THE PATIENT REACHED OUT TO THE OFFICE VIA [employer] MESSAGE AND STATES THAT JAMES EPLEY WANTS TO SEE HIM REGARDING THE INFECTION.    ATTEMPT TO WARM TRANSFER DUE TO NO AVAILABILITY, NO ANSWER.    PATIENT REQUEST A CALL BACK ASAP TO SCHEDULE.          "

## 2023-11-30 ENCOUNTER — OFFICE VISIT (OUTPATIENT)
Age: 73
End: 2023-11-30
Payer: MEDICARE

## 2023-11-30 VITALS — WEIGHT: 178 LBS | TEMPERATURE: 97.5 F | BODY MASS INDEX: 26.36 KG/M2 | HEIGHT: 69 IN

## 2023-11-30 DIAGNOSIS — L98.9 SKIN LESION OF RIGHT LOWER EXTREMITY: Primary | ICD-10-CM

## 2023-12-01 NOTE — PROGRESS NOTES
"Chief Complaint:  Right lower leg lesion     HPI:  Mr. Chang comes in today for evaluation of lesion on right lower leg.  Onset of symptoms approximately 3 days ago.  He reports the lesion first started as a very small bump with \"pus\", but it did increase to about the size of a quarter.  He has been applying neosporin and peroxide then covering with a band aid.  He was very concerned about infection since he recently had a left knee replacement.  Reports he has noticed improvement in the overall appearance.  Denies fever, chills, pain or warmth at the site, nausea, vomiting, or general malaise.      Of note, reports the left knee is doing very well.  He says he is nearly finished with formal physical therapy.      Vitals:    11/30/23 0954   Temp: 97.5 °F (36.4 °C)   Weight: 80.7 kg (178 lb)   Height: 175.3 cm (69.02\")     Exam:  Right lower leg is examined:  Small lesion approximately 6 mm noted on anterior shin.  Mild erythema.  No purulence.  No active drainage. No tenderness to periwound area.       Assessment:   Small lesion right lower leg, possibly related to infected hair follicle    Plan:  I applied betadine to the wound and allowed to air dry.  He may keep wound open to air unless clothing causes irritation.  I recommended he continue to use betadine on the site, swabs provided.  I instructed him to call if any signs of infection develop as discussed.  He verbalized understanding.    TRUONG Coleman  11/30/2023    Answers submitted by the patient for this visit:  Primary Reason for Visit (Submitted on 11/29/2023)  What is the primary reason for your visit?: Rash  Rash Questionnaire (Submitted on 11/29/2023)  Chief Complaint: Rash  Chronicity: new  Onset: in the past 7 days  Progression since onset: gradually worsening  Affected locations: right leg  Characteristics: redness  Exposed to: nothing    "

## 2023-12-14 RX ORDER — BUPROPION HYDROCHLORIDE 150 MG/1
150 TABLET, EXTENDED RELEASE ORAL DAILY
Qty: 90 TABLET | Refills: 1 | Status: SHIPPED | OUTPATIENT
Start: 2023-12-14

## 2023-12-18 ENCOUNTER — HOSPITAL ENCOUNTER (OUTPATIENT)
Dept: CT IMAGING | Facility: HOSPITAL | Age: 73
Discharge: HOME | End: 2023-12-18
Admitting: NURSE PRACTITIONER

## 2023-12-18 ENCOUNTER — OFFICE VISIT (OUTPATIENT)
Dept: ORTHOPEDIC SURGERY | Facility: CLINIC | Age: 73
End: 2023-12-18
Payer: MEDICARE

## 2023-12-18 VITALS — HEIGHT: 69 IN | TEMPERATURE: 98.3 F | BODY MASS INDEX: 27 KG/M2 | WEIGHT: 182.3 LBS

## 2023-12-18 DIAGNOSIS — Z09 SURGERY FOLLOW-UP: ICD-10-CM

## 2023-12-18 DIAGNOSIS — Z96.652 STATUS POST LEFT KNEE REPLACEMENT: Primary | ICD-10-CM

## 2023-12-18 PROCEDURE — 75571 CT HRT W/O DYE W/CA TEST: CPT

## 2023-12-18 RX ORDER — CEPHALEXIN 500 MG/1
CAPSULE ORAL
Qty: 4 CAPSULE | Refills: 2 | Status: SHIPPED | OUTPATIENT
Start: 2023-12-18

## 2023-12-18 NOTE — PROGRESS NOTES
Juan C Kumar : 1950 MRN: [id number] DATE: 2023     DIAGNOSIS: 3 month follow up left TKA      SUBJECTIVE:  Patient returns today for 3 month follow up of left total knee replacement.  Patient reports doing well with no unusual complaints.     Vitals:    23 0909   Temp: 98.3 °F (36.8 °C)       OBJECTIVE:     Exam:  The incision is well healed. No sign of infection. Range of motion: 0 to 125. The calf is soft and nontender with a negative Homans sign.  Gait is reciprocal heel-to-toe and nonantalgic.    DIAGNOSTIC STUDIES    Xrays: AP, lateral and merchant's views of the left knee are ordered and reviewed for evaluation of recent knee replacement. They demonstrate a well positioned, well aligned knee replacement without complicating factors noted.  In comparison with previous films there has been no change.    ASSESSMENT: 3 months status post left knee replacement.    PLAN: 1) Continue with PT exercises as prescribed   2) Follow up in 6 months   3) Antibiotic prophylaxis discussed    Frank Bland MD    2023

## 2024-01-15 ENCOUNTER — OFFICE VISIT (OUTPATIENT)
Dept: CARDIOLOGY | Facility: CLINIC | Age: 74
End: 2024-01-15
Payer: MEDICARE

## 2024-01-15 VITALS
SYSTOLIC BLOOD PRESSURE: 120 MMHG | HEIGHT: 69 IN | DIASTOLIC BLOOD PRESSURE: 78 MMHG | OXYGEN SATURATION: 98 % | WEIGHT: 193 LBS | RESPIRATION RATE: 16 BRPM | HEART RATE: 86 BPM | BODY MASS INDEX: 28.58 KG/M2

## 2024-01-15 DIAGNOSIS — R01.1 MURMUR: ICD-10-CM

## 2024-01-15 DIAGNOSIS — I35.9 AORTIC VALVE CALCIFICATION: Primary | ICD-10-CM

## 2024-01-15 DIAGNOSIS — R94.31 ABNORMAL ECG: ICD-10-CM

## 2024-01-15 PROCEDURE — 3078F DIAST BP <80 MM HG: CPT | Performed by: INTERNAL MEDICINE

## 2024-01-15 PROCEDURE — 99204 OFFICE O/P NEW MOD 45 MIN: CPT | Performed by: INTERNAL MEDICINE

## 2024-01-15 PROCEDURE — 3074F SYST BP LT 130 MM HG: CPT | Performed by: INTERNAL MEDICINE

## 2024-01-15 NOTE — PROGRESS NOTES
Subjective:     Encounter Date:01/15/24      Patient ID: Juan C Kumar is a 73 y.o. male.    Chief Complaint:  History of Present Illness    Dear Arnol,    I had the pleasure of seeing this patient in the office today for initial evaluation and consultation.  I appreciate that you sent him in to see us.  They come in today to be seen for evaluation after recent possible abnormal EKG.    He had an EKG performed September 13, 2023 that was felt to be possible old anterior myocardial infarction.  Patient had poor R wave progression on the EKG.  EKG was noted to be unchanged from an EKG in 2019.  Patient then had a coronary calcium scan performed December 18, 2023 with a coronary calcium score of 0.  They did note calcium on the aortic valve in the mitral annulus.    This patient has no known cardiac history.  This patient has no history of coronary artery disease, congestive heart failure, rheumatic fever, rheumatic heart disease, congenital heart disease or heart murmur.  This patient has never required invasive cardiovascular evaluation.    He denies any chest pain, pressure, tightness, squeezing, or heartburn.  He has not experienced any feeling of palpitations, tachycardia or heart racing and no presyncope or syncope.  There has not been any problems with dizziness or lightheadedness.  There has not been any orthopnea or PND, and no problems with lower extremity edema.  He denies any shortness of breath at rest or with activity and has not had any wheezing.  He has not had any problems with unexplained nausea or vomiting. He has continued to perform daily activities of living without any specific problem or change in the level of activity.  He has not been recently hospitalized for any reason.    The following portions of the patient's history were reviewed and updated as appropriate: allergies, current medications, past family history, past medical history, past social history, past surgical history and problem  "list.    Procedures       Objective:     Vitals:    01/15/24 0916   BP: 120/78   BP Location: Right arm   Patient Position: Sitting   Cuff Size: Adult   Pulse: 86   Resp: 16   SpO2: 98%   Weight: 87.5 kg (193 lb)   Height: 175.3 cm (69\")     Body mass index is 28.5 kg/m².      Vitals reviewed.   Constitutional:       General: Not in acute distress.     Appearance: Well-developed. Not diaphoretic.   Eyes:      General:         Right eye: No discharge.         Left eye: No discharge.      Conjunctiva/sclera: Conjunctivae normal.   HENT:      Head: Normocephalic and atraumatic.      Nose: Nose normal.   Neck:      Thyroid: No thyromegaly.      Trachea: No tracheal deviation.   Pulmonary:      Effort: Pulmonary effort is normal. No respiratory distress.      Breath sounds: Normal breath sounds. No stridor.   Chest:      Chest wall: Not tender to palpatation.   Cardiovascular:      Normal rate. Regular rhythm.      Murmurs: There is no murmur.      . No S3 gallop. No click. No rub.   Pulses:     Intact distal pulses.   Edema:     Peripheral edema absent.   Abdominal:      General: Bowel sounds are normal. There is no distension.      Palpations: Abdomen is soft. There is no abdominal mass.   Musculoskeletal: Normal range of motion.         General: No tenderness or deformity.      Cervical back: Normal range of motion and neck supple. Skin:     General: Skin is warm and dry.      Findings: No erythema or rash.   Neurological:      Mental Status: Alert.   Psychiatric:         Attention and Perception: Attention normal.         Data and records reviewed:     Lab Results   Component Value Date    GLUCOSE 104 (H) 09/13/2023    BUN 13 09/13/2023    CREATININE 0.80 09/13/2023    EGFRRESULT 92 06/16/2022    EGFR 92.9 09/13/2023    BCR 16.3 09/13/2023    K 4.2 09/13/2023    CO2 25.0 09/13/2023    CALCIUM 9.4 09/13/2023    PROTENTOTREF 7.4 06/16/2022    ALBUMIN 4.3 09/13/2023    BILITOT 0.6 09/13/2023    AST 21 09/13/2023    ALT " 24 09/13/2023     Lab Results   Component Value Date    CHOL 137 09/13/2023    CHOL 134 03/09/2023     Lab Results   Component Value Date    TRIG 58 09/13/2023    TRIG 85 03/09/2023    TRIG 49 06/16/2022     Lab Results   Component Value Date    HDL 52 09/13/2023    HDL 45 03/09/2023    HDL 61 06/16/2022     Lab Results   Component Value Date    LDL 73 09/13/2023    LDL 73 03/09/2023     (H) 06/16/2022     Lab Results   Component Value Date    VLDL 12 09/13/2023    VLDL 16 03/09/2023    VLDL 10 06/16/2022     Lab Results   Component Value Date    LDLHDL 1.41 09/13/2023    LDLHDL 1.60 03/09/2023    LDLHDL 1.8 06/16/2022     CBC          3/9/2023    08:39 9/13/2023    10:29   CBC   WBC 7.22  7.23    RBC 5.23  5.22    Hemoglobin 15.0  15.9    Hematocrit 48.5  47.6    MCV 92.7  91.2    MCH 28.7  30.5    MCHC 30.9  33.4    RDW 13.8  12.2    Platelets 262  301      CT Cardiac Calcium Score Without Dye    Result Date: 12/21/2023  Total coronary artery Agatston calcification score is   0 .  These findings represent no identifiable coronary atherosclerotic burden.  A score of < 10 represents only a minimal coronary atherosclerotic burden with a negative predictive value of 90-95%.  A score of  represents a mild coronary atherosclerotic burden with a relative risk of death of 1.6 as compared to individuals with a score of < 10.  The risk is higher if this score is > 75% for this age group or if there is > 1 calcified vessel.  A score of 101-400 represents a moderate coronary atherosclerotic burden with a relative risk of death of 1.7 as compared to individuals with a score of < 10.  The risk is higher if this score is > 75% for this age group or if there is > 1 calcified vessel.  A score of 401-1000 represents a large coronary atherosclerotic burden with a relative risk of death of 2.5 as compared to individuals with a score of < 10.  The risk is higher if this score is > 75% for this age group or if there is > 1  calcified vessel.  A score of > 1000 represents an extensive coronary atherosclerotic burden with a relative risk of death of 4 as compared to individuals with a score of < 10.  The risk is higher if this score is > 75% for this age group or if there is > 1 calcified vessel.   Incidentally, there is a moderate-sized paraesophageal hernia.  Ascending thoracic aorta is mildly ectatic measuring 3.6 cm. Descending thoracic aorta measures 2.7 cm.  Aortic valve and mitral annulus calcifications are noted.   Dr. Christi Be M.D reviewed the images and diagnostic data and performed the interpretation.   This report was finalized on 12/21/2023 4:26 PM by Dr. Christi Be M.D on Workstation: [id number]      XR Knee 3 View Left    Result Date: 12/18/2023  Ordering physician's impression is located in the Encounter Note dated 12/18/23. X-ray performed in the DR room.     XR Knee 3 View Left    Result Date: 11/8/2023  Ordering physician's impression is located in the Encounter Note dated 11/08/23. X-ray performed in the DR room.            Assessment:          Diagnosis Plan   1. Aortic valve calcification  Adult Transthoracic Echo Complete W/ Cont if Necessary Per Protocol      2. Murmur  Adult Transthoracic Echo Complete W/ Cont if Necessary Per Protocol      3. Abnormal ECG               Plan:       1.  Abnormal EKG-this appears to be poor R wave progression, unchanged from EKG in 2018.  No evidence of any plaque seen on the coronary calcium scan, no symptoms  2.  Aortic valve calcification-noted on the coronary artery calcium scan.  Trace murmur, we will obtain an echocardiogram.    Thank you very much for allowing us to participate in the care of this pleasant patient.  Please don't hesitate to call if I can be of assistance in any way.      Current Outpatient Medications:     acetaminophen (TYLENOL) 325 MG tablet, Take 2 tablets by mouth 2 (Two) Times a Day As Needed for Mild Pain., Disp: 60 tablet, Rfl: 0    aspirin 81 MG  EC tablet, Take 1 tablet by mouth 2 (Two) Times a Day., Disp: 60 tablet, Rfl: 0    atorvastatin (LIPITOR) 40 MG tablet, TAKE ONE TABLET BY MOUTH EVERY EVENING (Patient taking differently: Take 1 tablet by mouth Every Night. TAKE ONE TABLET BY MOUTH EVERY EVENING), Disp: 90 tablet, Rfl: 3    B Complex-C (SUPER B COMPLEX/VITAMIN C) tablet, Take 1 tablet by mouth Daily. Indications: vitamin, Disp: , Rfl:     buPROPion SR (WELLBUTRIN SR) 150 MG 12 hr tablet, TAKE 1 TABLET BY MOUTH DAILY FOR DEPRESSION, Disp: 90 tablet, Rfl: 1    cephalexin (KEFLEX) 500 MG capsule, 4 pills one hour prior to procedure (Patient taking differently: 4 pills one hour prior to dental procedure), Disp: 4 capsule, Rfl: 2    Cholecalciferol (VITAMIN D3) 2000 units capsule, Two OTC caps  QDay to supplement Vitamin D (Patient taking differently: Take 2 capsules by mouth Daily. Two OTC caps  QDay to supplement Vitamin D), Disp: 60 capsule, Rfl: prn    coenzyme Q10 100 MG capsule, Take 1 capsule by mouth Daily. Indications: vitamin, Disp: , Rfl:     esomeprazole (nexIUM) 20 MG capsule, Take 1 capsule by mouth Every Night. Indications: Gastroesophageal Reflux Disease, Disp: , Rfl:     fluticasone (FLONASE) 50 MCG/ACT nasal spray, SPRAY TWO SPRAYS IN EACH NOSTRIL ONCE DAILY (Patient taking differently: into the nostril(s) as directed by provider.), Disp: 16 mL, Rfl: 5    loratadine-pseudoephedrine (Claritin-D 12 Hour) 5-120 MG per 12 hr tablet, Take 1 tablet by mouth Daily. As needed (Patient taking differently: Take 1 tablet by mouth Daily As Needed for Allergies. As needed), Disp: 30 tablet, Rfl: 3    losartan (COZAAR) 25 MG tablet, TAKE ONE TABLET BY MOUTH DAILY FOR BLOOD PRESSURE, Disp: 90 tablet, Rfl: 1    Semaglutide, 2 MG/DOSE, (Ozempic, 2 MG/DOSE,) 8 MG/3ML solution pen-injector, Inject 2 mg under the skin into the appropriate area as directed 1 (One) Time Per Week. Indications: Type 2 Diabetes, Disp: 9 mL, Rfl: 1    sildenafil (VIAGRA) 100 MG  tablet, TAKE 1/2 TO 1 TABLET BY MOUTH DAILY ONE HOUR PRIOR TO INTIMACY AS NEEDED, Disp: 9 tablet, Rfl: 5         No follow-ups on file.

## 2024-01-30 ENCOUNTER — HOSPITAL ENCOUNTER (OUTPATIENT)
Dept: CARDIOLOGY | Facility: HOSPITAL | Age: 74
Discharge: HOME | End: 2024-01-30
Admitting: INTERNAL MEDICINE
Payer: MEDICARE

## 2024-01-30 VITALS
WEIGHT: 193 LBS | BODY MASS INDEX: 28.58 KG/M2 | HEIGHT: 69 IN | HEART RATE: 96 BPM | SYSTOLIC BLOOD PRESSURE: 130 MMHG | OXYGEN SATURATION: 96 % | DIASTOLIC BLOOD PRESSURE: 72 MMHG

## 2024-01-30 DIAGNOSIS — R01.1 MURMUR: ICD-10-CM

## 2024-01-30 DIAGNOSIS — I35.9 AORTIC VALVE CALCIFICATION: ICD-10-CM

## 2024-01-30 LAB
AORTIC ARCH: 3.1 CM
AORTIC DIMENSIONLESS INDEX: 0.5 (DI)
ASCENDING AORTA: 3.2 CM
BH CV ECHO MEAS - ACS: 2.03 CM
BH CV ECHO MEAS - AO MAX PG: 9.9 MMHG
BH CV ECHO MEAS - AO MEAN PG: 5 MMHG
BH CV ECHO MEAS - AO ROOT DIAM: 3.8 CM
BH CV ECHO MEAS - AO V2 MAX: 157 CM/SEC
BH CV ECHO MEAS - AO V2 VTI: 28 CM
BH CV ECHO MEAS - AVA(I,D): 2.37 CM2
BH CV ECHO MEAS - EDV(CUBED): 66.7 ML
BH CV ECHO MEAS - EDV(MOD-SP2): 72 ML
BH CV ECHO MEAS - EDV(MOD-SP4): 89 ML
BH CV ECHO MEAS - EF(MOD-BP): 62.8 %
BH CV ECHO MEAS - EF(MOD-SP2): 63.9 %
BH CV ECHO MEAS - EF(MOD-SP4): 65.2 %
BH CV ECHO MEAS - ESV(CUBED): 13.6 ML
BH CV ECHO MEAS - ESV(MOD-SP2): 26 ML
BH CV ECHO MEAS - ESV(MOD-SP4): 31 ML
BH CV ECHO MEAS - FS: 41.2 %
BH CV ECHO MEAS - IVS/LVPW: 1.03 CM
BH CV ECHO MEAS - IVSD: 1.2 CM
BH CV ECHO MEAS - LAT PEAK E' VEL: 6.6 CM/SEC
BH CV ECHO MEAS - LV DIASTOLIC VOL/BSA (35-75): 45.1 CM2
BH CV ECHO MEAS - LV MASS(C)D: 166.3 GRAMS
BH CV ECHO MEAS - LV MAX PG: 2.3 MMHG
BH CV ECHO MEAS - LV MEAN PG: 1 MMHG
BH CV ECHO MEAS - LV SYSTOLIC VOL/BSA (12-30): 15.7 CM2
BH CV ECHO MEAS - LV V1 MAX: 75.8 CM/SEC
BH CV ECHO MEAS - LV V1 VTI: 13 CM
BH CV ECHO MEAS - LVIDD: 4.1 CM
BH CV ECHO MEAS - LVIDS: 2.39 CM
BH CV ECHO MEAS - LVOT AREA: 5.1 CM2
BH CV ECHO MEAS - LVOT DIAM: 2.5 CM
BH CV ECHO MEAS - LVPWD: 1.17 CM
BH CV ECHO MEAS - MED PEAK E' VEL: 6.2 CM/SEC
BH CV ECHO MEAS - MV A DUR: 0.11 SEC
BH CV ECHO MEAS - MV DEC SLOPE: 1090 CM/SEC2
BH CV ECHO MEAS - MV DEC TIME: 0.13 SEC
BH CV ECHO MEAS - MV E MAX VEL: 85.3 CM/SEC
BH CV ECHO MEAS - MV MAX PG: 6.1 MMHG
BH CV ECHO MEAS - MV MEAN PG: 3.5 MMHG
BH CV ECHO MEAS - MV P1/2T: 25 MSEC
BH CV ECHO MEAS - MV V2 VTI: 22.1 CM
BH CV ECHO MEAS - MVA(P1/2T): 8.8 CM2
BH CV ECHO MEAS - MVA(VTI): 3 CM2
BH CV ECHO MEAS - PA ACC TIME: 0.08 SEC
BH CV ECHO MEAS - PA V2 MAX: 101.8 CM/SEC
BH CV ECHO MEAS - PULM A REVS DUR: 0.09 SEC
BH CV ECHO MEAS - PULM A REVS VEL: 31.7 CM/SEC
BH CV ECHO MEAS - PULM DIAS VEL: 41.1 CM/SEC
BH CV ECHO MEAS - PULM S/D: 1.36
BH CV ECHO MEAS - PULM SYS VEL: 56.1 CM/SEC
BH CV ECHO MEAS - QP/QS: 0.97
BH CV ECHO MEAS - RAP SYSTOLE: 3 MMHG
BH CV ECHO MEAS - RV MAX PG: 2.38 MMHG
BH CV ECHO MEAS - RV V1 MAX: 77.1 CM/SEC
BH CV ECHO MEAS - RV V1 VTI: 14.9 CM
BH CV ECHO MEAS - RVOT DIAM: 2.35 CM
BH CV ECHO MEAS - RVSP: 23.3 MMHG
BH CV ECHO MEAS - SI(MOD-SP2): 23.3 ML/M2
BH CV ECHO MEAS - SI(MOD-SP4): 29.4 ML/M2
BH CV ECHO MEAS - SUP REN AO DIAM: 2.2 CM
BH CV ECHO MEAS - SV(LVOT): 66.4 ML
BH CV ECHO MEAS - SV(MOD-SP2): 46 ML
BH CV ECHO MEAS - SV(MOD-SP4): 58 ML
BH CV ECHO MEAS - SV(RVOT): 64.6 ML
BH CV ECHO MEAS - TAPSE (>1.6): 1.79 CM
BH CV ECHO MEAS - TR MAX PG: 20.3 MMHG
BH CV ECHO MEAS - TR MAX VEL: 225.3 CM/SEC
BH CV ECHO MEASUREMENTS AVERAGE E/E' RATIO: 13.33
BH CV VAS BP LEFT ARM: NORMAL MMHG
BH CV XLRA - RV BASE: 2.16 CM
BH CV XLRA - RV LENGTH: 6.5 CM
BH CV XLRA - RV MID: 2.33 CM
BH CV XLRA - TDI S': 14.7 CM/SEC
LEFT ATRIUM VOLUME INDEX: 22.1 ML/M2
SINUS: 3.7 CM
STJ: 3.2 CM

## 2024-01-30 PROCEDURE — 93306 TTE W/DOPPLER COMPLETE: CPT | Performed by: INTERNAL MEDICINE

## 2024-01-30 PROCEDURE — 93306 TTE W/DOPPLER COMPLETE: CPT

## 2024-03-05 RX ORDER — LORATADINE, PSEUDOEPHEDRINE SULFATE 5; 120 MG/1; MG/1
1 TABLET, FILM COATED, EXTENDED RELEASE ORAL DAILY PRN
Qty: 30 TABLET | Refills: 3 | OUTPATIENT
Start: 2024-03-05

## 2024-03-07 RX ORDER — LORATADINE AND PSEUDOEPHEDRINE SULFATE 5; 120 MG/1; MG/1
1 TABLET, EXTENDED RELEASE ORAL DAILY
Qty: 30 TABLET | Refills: 3 | Status: SHIPPED | OUTPATIENT
Start: 2024-03-07

## 2024-03-20 ENCOUNTER — TELEPHONE (OUTPATIENT)
Dept: FAMILY MEDICINE CLINIC | Facility: CLINIC | Age: 74
End: 2024-03-20
Payer: MEDICARE

## 2024-03-20 DIAGNOSIS — I10 MILD ESSENTIAL HYPERTENSION: ICD-10-CM

## 2024-03-20 DIAGNOSIS — Z00.00 HEALTH CARE MAINTENANCE: ICD-10-CM

## 2024-03-20 DIAGNOSIS — R73.03 PREDIABETES: Primary | ICD-10-CM

## 2024-03-20 DIAGNOSIS — E78.2 MIXED HYPERLIPIDEMIA: ICD-10-CM

## 2024-03-25 ENCOUNTER — LAB (OUTPATIENT)
Dept: LAB | Facility: HOSPITAL | Age: 74
End: 2024-03-25
Payer: MEDICARE

## 2024-03-25 PROCEDURE — 85025 COMPLETE CBC W/AUTO DIFF WBC: CPT | Performed by: NURSE PRACTITIONER

## 2024-03-25 PROCEDURE — 81001 URINALYSIS AUTO W/SCOPE: CPT | Performed by: NURSE PRACTITIONER

## 2024-03-25 PROCEDURE — 80053 COMPREHEN METABOLIC PANEL: CPT | Performed by: NURSE PRACTITIONER

## 2024-03-25 PROCEDURE — 82570 ASSAY OF URINE CREATININE: CPT | Performed by: NURSE PRACTITIONER

## 2024-03-25 PROCEDURE — 82043 UR ALBUMIN QUANTITATIVE: CPT | Performed by: NURSE PRACTITIONER

## 2024-03-25 PROCEDURE — 83036 HEMOGLOBIN GLYCOSYLATED A1C: CPT | Performed by: NURSE PRACTITIONER

## 2024-03-25 PROCEDURE — 80061 LIPID PANEL: CPT | Performed by: NURSE PRACTITIONER

## 2024-03-29 DIAGNOSIS — I10 MILD ESSENTIAL HYPERTENSION: Primary | ICD-10-CM

## 2024-03-29 DIAGNOSIS — Z12.5 PROSTATE CANCER SCREENING: ICD-10-CM

## 2024-03-29 DIAGNOSIS — E78.2 MIXED HYPERLIPIDEMIA: ICD-10-CM

## 2024-03-29 DIAGNOSIS — Z00.00 HEALTH CARE MAINTENANCE: ICD-10-CM

## 2024-03-29 DIAGNOSIS — R73.03 PREDIABETES: ICD-10-CM

## 2024-03-29 RX ORDER — SEMAGLUTIDE 2.68 MG/ML
2 INJECTION, SOLUTION SUBCUTANEOUS WEEKLY
Qty: 9 ML | Refills: 1 | Status: SHIPPED | OUTPATIENT
Start: 2024-03-29

## 2024-03-29 NOTE — TELEPHONE ENCOUNTER
Rx Refill Note  Requested Prescriptions     Pending Prescriptions Disp Refills    Semaglutide, 2 MG/DOSE, (Ozempic, 2 MG/DOSE,) 8 MG/3ML solution pen-injector 9 mL 1     Sig: Inject 2 mg under the skin into the appropriate area as directed 1 (One) Time Per Week. Indications: Type 2 Diabetes      Last office visit with prescribing clinician: 9/15/2023   Last telemedicine visit with prescribing clinician: Visit date not found   Next office visit with prescribing clinician: Visit date not found                         Would you like a call back once the refill request has been completed: [] Yes [] No    If the office needs to give you a call back, can they leave a voicemail: [] Yes [] No    Jaylyn Kellogg Rep  03/29/24, 09:08 EDT

## 2024-04-04 RX ORDER — LOSARTAN POTASSIUM 25 MG/1
TABLET ORAL
Qty: 90 TABLET | Refills: 1 | Status: SHIPPED | OUTPATIENT
Start: 2024-04-04

## 2024-04-08 ENCOUNTER — TELEPHONE (OUTPATIENT)
Dept: FAMILY MEDICINE CLINIC | Facility: CLINIC | Age: 74
End: 2024-04-08

## 2024-04-10 RX ORDER — SEMAGLUTIDE 2.68 MG/ML
2 INJECTION, SOLUTION SUBCUTANEOUS WEEKLY
Qty: 9 ML | Refills: 1 | Status: SHIPPED | OUTPATIENT
Start: 2024-04-10

## 2024-04-17 RX ORDER — SEMAGLUTIDE 1.34 MG/ML
INJECTION, SOLUTION SUBCUTANEOUS
Qty: 9 ML | Refills: 0 | OUTPATIENT
Start: 2024-04-17

## 2024-04-17 RX ORDER — SEMAGLUTIDE 1.34 MG/ML
INJECTION, SOLUTION SUBCUTANEOUS
Qty: 5 ML | Refills: 0 | OUTPATIENT
Start: 2024-04-17

## 2024-04-17 NOTE — TELEPHONE ENCOUNTER
Rx Refill Note  Requested Prescriptions     Pending Prescriptions Disp Refills    Ozempic, 0.25 or 0.5 MG/DOSE, 2 MG/1.5ML solution pen-injector [Pharmacy Med Name: OZEMPIC (0.25 OR 0.5 MG/DOSE 2 SOPN] 5 mL 0     Sig: To be filled by Provider    Ozempic, 1 MG/DOSE, 4 MG/3ML solution pen-injector [Pharmacy Med Name: OZEMPIC 1 MG/DOSE PEN (4 MG/3ML)] 9 mL 0     Sig: To be filled by Provider      Last office visit with prescribing clinician: 9/15/2023   Last telemedicine visit with prescribing clinician: Visit date not found   Next office visit with prescribing clinician: 10/22/2024                         Would you like a call back once the refill request has been completed: [] Yes [] No    If the office needs to give you a call back, can they leave a voicemail: [] Yes [] No    Tabby Good MA  04/17/24, 08:13 EDT

## 2024-04-29 RX ORDER — FLUTICASONE PROPIONATE 50 MCG
SPRAY, SUSPENSION (ML) NASAL
Qty: 16 ML | Refills: 5 | Status: SHIPPED | OUTPATIENT
Start: 2024-04-29

## 2024-04-29 NOTE — TELEPHONE ENCOUNTER
Rx Refill Note  Requested Prescriptions     Pending Prescriptions Disp Refills    fluticasone (FLONASE) 50 MCG/ACT nasal spray [Pharmacy Med Name: FLUTICASONE PROP 50 MCG SPRAY] 16 mL 5     Sig: INSTILL 2 SPRAYS IN EACH NOSTIL ONCE DAILY      Last office visit with prescribing clinician: 9/15/2023   Last telemedicine visit with prescribing clinician: Visit date not found   Next office visit with prescribing clinician: 10/22/2024                         Would you like a call back once the refill request has been completed: [] Yes [] No    If the office needs to give you a call back, can they leave a voicemail: [] Yes [] No    Tabby Good MA  04/29/24, 08:46 EDT

## 2024-05-25 NOTE — PROGRESS NOTES
Patient: Juan C Kumar    YOB: 1950    Medical Record Number: [id number]    Chief Complaints: Follow-up regarding left knee pain    History of Present Illness:     72 y.o. male patient who presents for follow-up of the left knee.  Patient reports persistent symptoms.  He recently got an MRI and presents today to review that study and discuss further options.    Allergies: No Known Allergies    Home Medications:    Current Outpatient Medications:   •  aspirin 81 MG EC tablet, Take 1 tablet by mouth Every Other Day., Disp: , Rfl:   •  atorvastatin (LIPITOR) 40 MG tablet, TAKE ONE TABLET BY MOUTH EVERY EVENING, Disp: 90 tablet, Rfl: 3  •  azelastine (ASTELIN) 0.1 % nasal spray, 2 sprays into the nostril(s) as directed by provider 2 (Two) Times a Day. As needed for nasal allergies, Disp: 1 each, Rfl: 12  •  B Complex-C (SUPER B COMPLEX/VITAMIN C) tablet, Take  by mouth., Disp: , Rfl:   •  buPROPion SR (WELLBUTRIN SR) 150 MG 12 hr tablet, TAKE ONE TABLET BY MOUTH DAILY FOR DEPRESSION, Disp: 90 tablet, Rfl: 1  •  Cholecalciferol (VITAMIN D3) 2000 units capsule, Two OTC caps  QDay to supplement Vitamin D, Disp: 60 capsule, Rfl: prn  •  coenzyme Q10 100 MG capsule, Take 1 capsule by mouth Daily., Disp: , Rfl:   •  esomeprazole (nexIUM) 20 MG capsule, Take 1 capsule by mouth Every Morning Before Breakfast., Disp: , Rfl:   •  fluticasone (FLONASE) 50 MCG/ACT nasal spray, SPRAY TWO SPRAYS IN EACH NOSTRIL ONCE DAILY, Disp: 16 mL, Rfl: 5  •  ipratropium (ATROVENT) 0.06 % nasal spray, 2 sprays into the nostril(s) as directed by provider 4 (Four) Times a Day. 2 sprays each nostril every night at bedtime, Disp: 15 mL, Rfl: 5  •  loratadine-pseudoephedrine (Claritin-D 12 Hour) 5-120 MG per 12 hr tablet, Take 1 tablet by mouth Daily. As needed, Disp: 30 tablet, Rfl: 3  •  losartan (COZAAR) 25 MG tablet, TAKE ONE TABLET BY MOUTH DAILY FOR BLOOD PRESSURE, Disp: 90 tablet, Rfl: 1  •  Omega-3 Fatty Acids (FISH OIL) 1200  MG capsule capsule, Take 1 capsule by mouth Daily., Disp: , Rfl:   •  Semaglutide, 2 MG/DOSE, (Ozempic, 2 MG/DOSE,) 8 MG/3ML solution pen-injector, Inject 2 mg under the skin into the appropriate area as directed 1 (One) Time Per Week., Disp: 9 mL, Rfl: 1  •  sildenafil (VIAGRA) 100 MG tablet, TAKE ONE-HALF TO ONE TABLET BY MOUTH 1 HOUR PRIOR TO INTIMACY AS NEEDED DAILY, Disp: 9 tablet, Rfl: 5    Past Medical History:   Diagnosis Date   • Allergic    • Arthritis    • Back pain    • Basal cell carcinoma    • Colon polyps    • Hyperlipidemia    • Numbness and tingling in hands     and legs   • Numbness and tingling of left leg    • Torn meniscus     pt states left knee       Past Surgical History:   Procedure Laterality Date   • COLONOSCOPY     • TONSILLECTOMY         Social History     Occupational History   • Not on file   Tobacco Use   • Smoking status: Former   • Smokeless tobacco: Never   Substance and Sexual Activity   • Alcohol use: Yes     Comment: social   • Drug use: No   • Sexual activity: Defer      Social History     Social History Narrative   • Not on file       Family History   Problem Relation Age of Onset   • Hyperlipidemia Mother    • Cancer Mother    • Hyperlipidemia Father    • Cancer Father    • Cancer Brother    • Melanoma Brother        Review of Systems:      Constitutional: Denies fever, shaking or chills   Eyes: Denies change in visual acuity   HEENT: Denies nasal congestion or sore throat   Respiratory: Denies cough or shortness of breath   Cardiovascular: Denies chest pain or edema  Endocrine: Denies tremors, palpitations, intolerance of heat or cold, polyuria, polydipsia.  GI: Denies abdominal pain, nausea, vomiting, bloody stools or diarrhea  : Denies frequency, urgency, incontinence, retention, or nocturia.  Musculoskeletal: Denies numbness, tingling or loss of motor function except as above  Integument: Denies rash, lesion or ulceration   Neurologic: Denies headache or focal weakness,  "deficits  Heme: Denies spontaneous or excessive bleeding, epistaxis, hematuria, melena, fatigue, enlarged or tender lymph nodes.      All other pertinent positives and negatives as noted above in HPI.    Physical Exam:  72 y.o. male  Vitals:    03/27/23 1335   Temp: 97.7 °F (36.5 °C)   Weight: 94.8 kg (209 lb)   Height: 172.7 cm (68\")     General:  Patient is awake and alert.  Appears in no acute distress or discomfort.    Psych:  Affect and demeanor are appropriate.    Cardiovascular:  Regular rate and rhythm.    Lungs:  Good chest expansion.  Breathing unlabored.    Extremities:  Left knee is examined.  Skin is benign.  Moderate medial joint line tenderness.  No effusion.  Good motion.  Hyperflexion is uncomfortable.  Medial Arun's is painful.  Normal motor and sensory function in the lower leg and foot.  Palpable pedal pulses.  Brisk capillary refill.    Imaging:  MRI of the left knee is reviewed along with the associated report.  Findings are listed below:    IMPRESSION:                 1. Evidence of partial grade 1-2 injury of the medial collateral ligament.  There is no complete   disruption.  2. Evidence for complex tear involving the body segment and posterior horn of the medial meniscus.  3. Moderate tricompartmental osteoarthritis.  These changes are most pronounced in the medial   compartment where full-thickness articular cartilage loss is noted.  There is also evidence for   loose body formation at the posterior aspect of the medial compartment.  A loose body is noted   measuring 1 cm.    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  He acknowledged understanding of this information.  All questions were carefully answered in detail.  At this time, the patient has elected for nonsurgical management.  He wanted to try cortisone injection " today.  The risk, benefits and alternatives were discussed.  He consented and injection was performed as described below.  He will follow-up as needed.    Large Joint Arthrocentesis: L knee  Date/Time: 3/27/2023 1:54 PM  Consent given by: patient  Site marked: site marked  Timeout: Immediately prior to procedure a time out was called to verify the correct patient, procedure, equipment, support staff and site/side marked as required   Supporting Documentation  Indications: pain   Procedure Details  Location: knee - L knee  Preparation: Patient was prepped and draped in the usual sterile fashion  Needle gauge: 21 G.  Approach: anterolateral  Medications administered: 80 mg methylPREDNISolone acetate 80 MG/ML; 2 mL lidocaine PF 1% 1 %  Patient tolerance: patient tolerated the procedure well with no immediate complications          Frank Bland MD    03/27/2023   Abnormal Lactate: > 2

## 2024-06-13 RX ORDER — BUPROPION HYDROCHLORIDE 150 MG/1
150 TABLET, EXTENDED RELEASE ORAL DAILY
Qty: 90 TABLET | Refills: 1 | Status: SHIPPED | OUTPATIENT
Start: 2024-06-13

## 2024-06-13 NOTE — TELEPHONE ENCOUNTER
Rx Refill Note  Requested Prescriptions     Pending Prescriptions Disp Refills    buPROPion SR (WELLBUTRIN SR) 150 MG 12 hr tablet [Pharmacy Med Name: buPROPion HCL  MG TABLET] 90 tablet 1     Sig: TAKE 1 TABLET BY MOUTH DAILY FOR DEPRESSION      Last office visit with prescribing clinician: 9/15/2023   Last telemedicine visit with prescribing clinician: Visit date not found   Next office visit with prescribing clinician: 10/22/2024                         Would you like a call back once the refill request has been completed: [] Yes [] No    If the office needs to give you a call back, can they leave a voicemail: [] Yes [] No    Tabby Good MA  06/13/24, 08:15 EDT

## 2024-06-17 ENCOUNTER — OFFICE VISIT (OUTPATIENT)
Dept: ORTHOPEDIC SURGERY | Facility: CLINIC | Age: 74
End: 2024-06-17
Payer: MEDICARE

## 2024-06-17 VITALS — TEMPERATURE: 98.3 F | WEIGHT: 182.3 LBS | BODY MASS INDEX: 27.63 KG/M2 | HEIGHT: 68 IN

## 2024-06-17 DIAGNOSIS — Z96.652 STATUS POST LEFT KNEE REPLACEMENT: Primary | ICD-10-CM

## 2024-06-17 PROCEDURE — 73562 X-RAY EXAM OF KNEE 3: CPT | Performed by: ORTHOPAEDIC SURGERY

## 2024-06-17 PROCEDURE — 1159F MED LIST DOCD IN RCRD: CPT | Performed by: ORTHOPAEDIC SURGERY

## 2024-06-17 PROCEDURE — 99212 OFFICE O/P EST SF 10 MIN: CPT | Performed by: ORTHOPAEDIC SURGERY

## 2024-06-17 PROCEDURE — 1160F RVW MEDS BY RX/DR IN RCRD: CPT | Performed by: ORTHOPAEDIC SURGERY

## 2024-06-17 NOTE — PROGRESS NOTES
"Juan C Kumar     : 1950     MRN: [id number]    DATE: 2024    DIAGNOSIS:  9 month follow up left total knee arthroplasty    SUBJECTIVE:  Patient returns today for follow up of a left total knee replacement. Patient reports doing well with no unusual complaints. He painted his baseboards several weeks ago.  He says that this temporarily caused him some soreness which is now resolved.  Denies any limitations due to the knee.    OBJECTIVE:  Temp 98.3 °F (36.8 °C) (Temporal)   Ht 172.7 cm (68\")   Wt 82.7 kg (182 lb 4.8 oz)   BMI 27.72 kg/m²   Family History   Problem Relation Age of Onset    Hyperlipidemia Mother     Cancer Mother         Skin    Hyperlipidemia Father     Cancer Father         Skin    Cancer Brother         Skin    Melanoma Brother     Malig Hyperthermia Neg Hx     Heart disease Neg Hx     Aneurysm Neg Hx      Past Medical History:   Diagnosis Date    Allergic     Arthritis     Back pain     Basal cell carcinoma     Colon polyps 2023    HISTORY    CTS (carpal tunnel syndrome)     Depression     Diabetes mellitus     ED (erectile dysfunction)     Enlarged prostate     GERD (gastroesophageal reflux disease)     Hyperlipidemia     Knee swelling     Lumbosacral disc disease ?    Dr. Julio Covington    Numbness and tingling in hands     RESOLVED    Numbness and tingling of left leg     RESOLVED    Tear of meniscus of knee 12/10/2022    Torn meniscus     LEFT     Past Surgical History:   Procedure Laterality Date    CARPAL TUNNEL RELEASE Bilateral     COLONOSCOPY      COLONOSCOPY N/A 2023    Procedure: COLONOSCOPY to cecum and TI with hot and cold snare polypectomies;  Surgeon: Frantz Singh MD;  Location: [facility] ENDOSCOPY;  Service: Gastroenterology;  Laterality: N/A;  PRE- hx of polyps  POST- polyps, diverticulosis    HAND SURGERY  Carpal tunnel. ?    Dr. Orozco    JOINT REPLACEMENT  10/28/2923    Left knee replacement    TONSILLECTOMY      TOTAL KNEE ARTHROPLASTY Left " 2023    Procedure: TOTAL KNEE ARTHROPLASTY;  Surgeon: Frank Bland MD;  Location: [facility] OR [facility];  Service: Orthopedics;  Laterality: Left;    TRIGGER POINT INJECTION  ?    Dr. Orozco    UMBILICAL HERNIA REPAIR      VASECTOMY       Social History     Socioeconomic History    Marital status:    Tobacco Use    Smoking status: Former     Current packs/day: 0.00     Average packs/day: 1 pack/day for 4.0 years (4.0 ttl pk-yrs)     Types: Cigarettes     Start date: 1969     Quit date: 1972     Years since quittin.4     Passive exposure: Past    Smokeless tobacco: Never    Tobacco comments:     QUIT    Vaping Use    Vaping status: Never Used   Substance and Sexual Activity    Alcohol use: Yes     Alcohol/week: 1.0 standard drink of alcohol     Types: 1 Glasses of wine per week     Comment: social    Drug use: No    Sexual activity: Yes     Partners: Female     Birth control/protection: Vasectomy, Surgical     Review of Systems:   A 14 point review of systems is reviewed with the patient.  Pertinent positives are listed above.  All others negative.    Exam:  The incision is well healed.  Range of motion is measured at 0 to 120.  The calf is soft and nontender with a negative Homans sign.  Alignment is neutral.  Good quad strength. There is no evidence of varus/valgus or flexion instability. No effusion.  Intact sensation to light touch.  Palpable pedal pulses    DIAGNOSTIC STUDIES    Xrays: AP, merchant and lateral views of the left knee were ordered and reviewed for evaluation of recent knee replacement.  The x-rays demonstrate a well positioned, well aligned knee replacement without complicating factors noted.  In comparison with previous films there has been no change.    ASSESSMENT:  9 month follow up left knee replacement.    PLAN: Appropriate activity modifications and restrictions discussed.  Antibiotic prophylaxis recommendations discussed.  Follow-up annually.    Frank Bland,  MD

## 2024-07-08 RX ORDER — ATORVASTATIN CALCIUM 40 MG/1
TABLET, FILM COATED ORAL
Qty: 90 TABLET | Refills: 3 | Status: SHIPPED | OUTPATIENT
Start: 2024-07-08

## 2024-07-08 NOTE — TELEPHONE ENCOUNTER
Rx Refill Note  Requested Prescriptions     Pending Prescriptions Disp Refills    atorvastatin (LIPITOR) 40 MG tablet [Pharmacy Med Name: ATORVASTATIN 40 MG TABLET] 90 tablet 3     Sig: TAKE ONE TABLET BY MOUTH EVERY EVENING      Last office visit with prescribing clinician: 9/15/2023   Last telemedicine visit with prescribing clinician: Visit date not found   Next office visit with prescribing clinician: 10/22/2024                         Would you like a call back once the refill request has been completed: [] Yes [] No    If the office needs to give you a call back, can they leave a voicemail: [] Yes [] No    Tabby Good MA  07/08/24, 08:04 EDT

## 2024-07-29 DIAGNOSIS — M54.50 ACUTE LOW BACK PAIN, UNSPECIFIED BACK PAIN LATERALITY, UNSPECIFIED WHETHER SCIATICA PRESENT: Primary | ICD-10-CM

## 2024-10-07 RX ORDER — LOSARTAN POTASSIUM 25 MG/1
TABLET ORAL
Qty: 90 TABLET | Refills: 1 | Status: SHIPPED | OUTPATIENT
Start: 2024-10-07

## 2024-10-07 NOTE — TELEPHONE ENCOUNTER
Rx Refill Note  Requested Prescriptions     Pending Prescriptions Disp Refills    losartan (COZAAR) 25 MG tablet [Pharmacy Med Name: LOSARTAN POTASSIUM 25 MG TAB] 90 tablet 1     Sig: TAKE ONE TABLET BY MOUTH DAILY FOR BLOOD PRESSURE      Last office visit with prescribing clinician: 9/15/2023   Last telemedicine visit with prescribing clinician: Visit date not found   Next office visit with prescribing clinician: 10/22/2024                         Would you like a call back once the refill request has been completed: [] Yes [] No    If the office needs to give you a call back, can they leave a voicemail: [] Yes [] No    Roel Aj MA  10/07/24, 10:05 EDT

## 2024-10-22 ENCOUNTER — OFFICE VISIT (OUTPATIENT)
Dept: FAMILY MEDICINE CLINIC | Facility: CLINIC | Age: 74
End: 2024-10-22
Payer: MEDICARE

## 2024-10-22 VITALS
TEMPERATURE: 98.5 F | DIASTOLIC BLOOD PRESSURE: 84 MMHG | SYSTOLIC BLOOD PRESSURE: 118 MMHG | OXYGEN SATURATION: 97 % | HEART RATE: 93 BPM | RESPIRATION RATE: 14 BRPM | HEIGHT: 68 IN | BODY MASS INDEX: 28.66 KG/M2 | WEIGHT: 189.1 LBS

## 2024-10-22 DIAGNOSIS — M51.369 DEGENERATION OF INTERVERTEBRAL DISC OF LUMBAR REGION, UNSPECIFIED WHETHER PAIN PRESENT: ICD-10-CM

## 2024-10-22 DIAGNOSIS — I10 MILD ESSENTIAL HYPERTENSION: ICD-10-CM

## 2024-10-22 DIAGNOSIS — R73.03 PREDIABETES: ICD-10-CM

## 2024-10-22 DIAGNOSIS — J30.2 SEASONAL ALLERGIC RHINITIS, UNSPECIFIED TRIGGER: ICD-10-CM

## 2024-10-22 DIAGNOSIS — Z00.00 MEDICARE ANNUAL WELLNESS VISIT, SUBSEQUENT: Primary | ICD-10-CM

## 2024-10-22 DIAGNOSIS — R35.1 NOCTURIA: ICD-10-CM

## 2024-10-22 DIAGNOSIS — E78.2 MIXED HYPERLIPIDEMIA: ICD-10-CM

## 2024-10-22 PROCEDURE — 3079F DIAST BP 80-89 MM HG: CPT | Performed by: NURSE PRACTITIONER

## 2024-10-22 PROCEDURE — G0439 PPPS, SUBSEQ VISIT: HCPCS | Performed by: NURSE PRACTITIONER

## 2024-10-22 PROCEDURE — 99213 OFFICE O/P EST LOW 20 MIN: CPT | Performed by: NURSE PRACTITIONER

## 2024-10-22 PROCEDURE — 3074F SYST BP LT 130 MM HG: CPT | Performed by: NURSE PRACTITIONER

## 2024-10-22 PROCEDURE — 3044F HG A1C LEVEL LT 7.0%: CPT | Performed by: NURSE PRACTITIONER

## 2024-10-22 PROCEDURE — 1126F AMNT PAIN NOTED NONE PRSNT: CPT | Performed by: NURSE PRACTITIONER

## 2024-10-22 RX ORDER — TAMSULOSIN HYDROCHLORIDE 0.4 MG/1
1 CAPSULE ORAL
Qty: 90 CAPSULE | Refills: 3 | Status: SHIPPED | OUTPATIENT
Start: 2024-10-22

## 2024-10-22 RX ORDER — AZELASTINE 1 MG/ML
1 SPRAY, METERED NASAL 2 TIMES DAILY
Qty: 30 ML | Refills: 5 | Status: SHIPPED | OUTPATIENT
Start: 2024-10-22

## 2024-10-22 NOTE — PROGRESS NOTES
Subjective   The ABCs of the Annual Wellness Visit  Medicare Wellness Visit      Juan C Kumar is a 74 y.o. patient who presents for a Medicare Wellness Visit.    The following portions of the patient's history were reviewed and   updated as appropriate: allergies, current medications, past family history, past medical history, past social history, past surgical history, and problem list.    Compared to one year ago, the patient's physical   health is worse.  Compared to one year ago, the patient's mental   health is worse.    Recent Hospitalizations:  He was not admitted to the hospital during the last year.     Current Medical Providers:  Patient Care Team:  Epley, James, APRN as PCP - General (Family Medicine)  Tay Orozco MD as Surgeon (Hand Surgery)    Outpatient Medications Prior to Visit   Medication Sig Dispense Refill    acetaminophen (TYLENOL) 325 MG tablet Take 2 tablets by mouth 2 (Two) Times a Day As Needed for Mild Pain. 60 tablet 0    aspirin 81 MG EC tablet Take 1 tablet by mouth 2 (Two) Times a Day. 60 tablet 0    atorvastatin (LIPITOR) 40 MG tablet TAKE ONE TABLET BY MOUTH EVERY EVENING 90 tablet 3    B Complex-C (SUPER B COMPLEX/VITAMIN C) tablet Take 1 tablet by mouth Daily. Indications: vitamin      buPROPion SR (WELLBUTRIN SR) 150 MG 12 hr tablet TAKE 1 TABLET BY MOUTH DAILY FOR DEPRESSION 90 tablet 1    cephalexin (KEFLEX) 500 MG capsule 4 pills one hour prior to procedure (Patient taking differently: 4 pills one hour prior to dental procedure) 4 capsule 2    Cholecalciferol (VITAMIN D3) 2000 units capsule Two OTC caps  QDay to supplement Vitamin D (Patient taking differently: Take 2 capsules by mouth Daily. Two OTC caps  QDay to supplement Vitamin D) 60 capsule prn    coenzyme Q10 100 MG capsule Take 1 capsule by mouth Daily. Indications: vitamin      esomeprazole (nexIUM) 20 MG capsule Take 1 capsule by mouth Every Night. Indications: Gastroesophageal Reflux Disease       fluticasone (FLONASE) 50 MCG/ACT nasal spray INSTILL 2 SPRAYS IN EACH NOSTIL ONCE DAILY 16 mL 5    loratadine-pseudoephedrine (Claritin-D 12 Hour) 5-120 MG per 12 hr tablet Take 1 tablet by mouth Daily. As needed  Indications: Hayfever 30 tablet 3    losartan (COZAAR) 25 MG tablet TAKE ONE TABLET BY MOUTH DAILY FOR BLOOD PRESSURE 90 tablet 1    Nirmatrelvir & Ritonavir, 300mg/100mg, (PAXLOVID) Take 3 tablets by mouth 2 (Two) Times a Day. (Hold atorvastatin 10 with) 30 tablet 0    Semaglutide, 2 MG/DOSE, (Ozempic, 2 MG/DOSE,) 8 MG/3ML solution pen-injector Inject 2 mg under the skin into the appropriate area as directed 1 (One) Time Per Week. Indications: Type 2 Diabetes 9 mL 1    sildenafil (VIAGRA) 100 MG tablet TAKE 1/2 TO 1 TABLET BY MOUTH DAILY ONE HOUR PRIOR TO INTIMACY AS NEEDED 9 tablet 5     No facility-administered medications prior to visit.     No opioid medication identified on active medication list. I have reviewed chart for other potential  high risk medication/s and harmful drug interactions in the elderly.      Aspirin is on active medication list. Aspirin use is not indicated based on review of current medical condition/s. Risk of harm outweighs potential benefits. Patient instructed to discontinue this medication.  .      Patient Active Problem List   Diagnosis    Hyperlipidemia    Gastroesophageal reflux disease    Osteoarthritis of both hands    Health care maintenance    Prostate cancer screening    Left-sided low back pain with left-sided sciatica    IFG (impaired fasting glucose)    Colon cancer screening    ]    Prediabetes    Depression    Grief    Mild major depression    Elevated blood pressure reading without diagnosis of hypertension    Erectile dysfunction    Acute pain of left knee    Osteoarthritis of left knee    Mild essential hypertension    History of adenomatous polyp of colon     Advance Care Planning Advance Directive is on file.  ACP discussion was held with the patient  "during this visit. Patient has an advance directive in EMR which is still valid.             Objective   Vitals:    10/22/24 1327   BP: 118/84   BP Location: Right arm   Patient Position: Sitting   Cuff Size: Adult   Pulse: 93   Resp: 14   Temp: 98.5 °F (36.9 °C)   TempSrc: Oral   SpO2: 97%   Weight: 85.8 kg (189 lb 1.6 oz)   Height: 172.7 cm (67.99\")   PainSc: 0-No pain       Estimated body mass index is 28.76 kg/m² as calculated from the following:    Height as of this encounter: 172.7 cm (67.99\").    Weight as of this encounter: 85.8 kg (189 lb 1.6 oz).            Does the patient have evidence of cognitive impairment? No  Lab Results   Component Value Date    CHLPL 137 10/15/2024    TRIG 80 10/15/2024    HDL 49 10/15/2024    LDL 72 10/15/2024    VLDL 16 10/15/2024    HGBA1C 5.50 10/15/2024                                                                                                Health  Risk Assessment    Smoking Status:  Social History     Tobacco Use   Smoking Status Former    Current packs/day: 0.00    Average packs/day: 1 pack/day for 4.0 years (4.0 ttl pk-yrs)    Types: Cigarettes    Start date: 1969    Quit date: 1972    Years since quittin.8    Passive exposure: Past   Smokeless Tobacco Never   Tobacco Comments    QUIT      Alcohol Consumption:  Social History     Substance and Sexual Activity   Alcohol Use Yes    Alcohol/week: 1.0 standard drink of alcohol    Types: 1 Glasses of wine per week    Comment: social       Fall Risk Screen  STEADI Fall Risk Assessment was completed, and patient is at LOW risk for falls.Assessment completed on:10/22/2024    Depression Screening:      10/22/2024     1:39 PM   PHQ-2/PHQ-9 Depression Screening   Little interest or pleasure in doing things Not at all   Feeling down, depressed, or hopeless Not at all     Health Habits and Functional and Cognitive Screening:      10/15/2024     9:59 AM   Functional & Cognitive Status   Do you have difficulty " preparing food and eating? No    Do you have difficulty bathing yourself, getting dressed or grooming yourself? No    Do you have difficulty using the toilet? No    Do you have difficulty moving around from place to place? No    Do you have trouble with steps or getting out of a bed or a chair? No    Current Diet Well Balanced Diet    Dental Exam Up to date    Eye Exam Up to date    Exercise (times per week) 3 times per week    Current Exercises Include Gardening;House Cleaning;Walking;Yard Work    Do you need help using the phone?  No    Are you deaf or do you have serious difficulty hearing?  No    Do you need help to go to places out of walking distance? No    Do you need help shopping? No    Do you need help preparing meals?  No    Do you need help with housework?  No    Do you need help with laundry? No    Do you need help taking your medications? No    Do you need help managing money? No    Do you ever drive or ride in a car without wearing a seat belt? No    Have you felt unusual stress, anger or loneliness in the last month? No    Who do you live with? Spouse    If you need help, do you have trouble finding someone available to you? No    Have you been bothered in the last four weeks by sexual problems? No    Do you have difficulty concentrating, remembering or making decisions? No        Patient-reported           Age-appropriate Screening Schedule:  Refer to the list below for future screening recommendations based on patient's age, sex and/or medical conditions. Orders for these recommended tests are listed in the plan section. The patient has been provided with a written plan.    Health Maintenance List  Health Maintenance   Topic Date Due    DIABETIC FOOT EXAM  Never done    AAA SCREEN (ONE-TIME)  Never done    DIABETIC EYE EXAM  12/01/2022    ANNUAL WELLNESS VISIT  03/14/2024    RSV Vaccine - Adults (1 - 1-dose 75+ series) Never done    URINE MICROALBUMIN  03/25/2025    HEMOGLOBIN A1C  04/15/2025     BMI FOLLOWUP  07/29/2025    PROSTATE CANCER SCREENING  10/15/2025    LIPID PANEL  10/15/2025    COLORECTAL CANCER SCREENING  05/05/2028    TDAP/TD VACCINES (3 - Td or Tdap) 06/05/2028    HEPATITIS C SCREENING  Completed    COVID-19 Vaccine  Completed    INFLUENZA VACCINE  Completed    Pneumococcal Vaccine 65+  Completed    ZOSTER VACCINE  Completed                                                                                                                                                CMS Preventative Services Quick Reference  Risk Factors Identified During Encounter  Fall Risk-High or Moderate: Discussed Fall Prevention in the home    The above risks/problems have been discussed with the patient.  Pertinent information has been shared with the patient in the After Visit Summary.  An After Visit Summary and PPPS were made available to the patient.    Follow Up:   Next Medicare Wellness visit to be scheduled in 1 year.         Additional E&M Note during same encounter follows:  Patient has additional, significant, and separately identifiable condition(s)/problem(s) that require work above and beyond the Medicare Wellness Visit     Chief Complaint  Medicare Wellness-subsequent (Pt. Is dealing w/ sinus issues, drainage, cough, congestion, Pt. Takes Claritin-D. Lower back pain. Pain will radiate across buttocks, & leg. Nocturia.)    Subjective   Very pleasant gentleman here today follow-up for Medicare wellness, overall he is doing well, hypertension essential controlled,  He has some chronic intermittent low back pain, it waxes and wanes and overall is doing pretty well he had MRI in 2022 just aggravates after standing, he has periods of improvement and bothers him some but nothing bad and he is not worried about anything at this time no night sweats or unexplained weight loss no progressive or worsening back    Some chronic nasal allergies, just never really gets good relief, lots of drainage in the morning take  "Sudafed once a week or so with no problems, uses Flonase,        Juan C is also being seen today for an annual adult preventative physical exam.  and Juan C is also being seen today for additional medical problem/s.    Review of Systems   Constitutional: Negative.    HENT:  Positive for rhinorrhea.    Eyes: Negative.    Respiratory: Negative.     Gastrointestinal: Negative.    Genitourinary: Negative.    Musculoskeletal:  Positive for back pain.   Neurological: Negative.    Hematological: Negative.               Objective   Vital Signs:  /84 (BP Location: Right arm, Patient Position: Sitting, Cuff Size: Adult)   Pulse 93   Temp 98.5 °F (36.9 °C) (Oral)   Resp 14   Ht 172.7 cm (67.99\")   Wt 85.8 kg (189 lb 1.6 oz)   SpO2 97%   BMI 28.76 kg/m²   Physical Exam            Assessment and Plan Additional age appropriate preventative wellness advice topics were discussed during today's preventative wellness exam(some topics already addressed during AWV portion of the note above):    Physical Activity: Advised cardiovascular activity 150 minutes per week as tolerated. (example brisk walk for 30 minutes, 5 days a week).     Nutrition: Discussed nutrition plan with patient. Information shared in after visit summary. Goal is for a well balanced diet to enhance overall health.     Healthy Weight: Discussed current and goal BMI with patient. Steps to attain this goal discussed. Information shared in after visit summary.              Medicare annual wellness visit, subsequent    Seasonal allergic rhinitis, unspecified trigger    Degeneration of intervertebral disc of lumbar region, unspecified whether pain present    Mild essential hypertension    Mixed hyperlipidemia     Prediabetes    Nocturia      Orders Placed This Encounter   Procedures    Urinalysis With Microscopic If Indicated (No Culture) - Urine, Clean Catch     Order Specific Question:   Release to patient     Answer:   Routine Release [[id number]]     New " Medications Ordered This Visit   Medications    azelastine (ASTELIN) 0.1 % nasal spray     Sig: Administer 1 spray into the nostril(s) as directed by provider 2 (Two) Times a Day. Use in each nostril as directed     Dispense:  30 mL     Refill:  5    tamsulosin (FLOMAX) 0.4 MG capsule 24 hr capsule     Sig: Take 1 capsule by mouth every night at bedtime.     Dispense:  90 capsule     Refill:  3          Follow Up   Return in about 6 months (around 4/22/2025) for Labs today.  Patient was given instructions and counseling regarding his condition or for health maintenance advice. Please see specific information pulled into the AVS if appropriate.          Discharge instructions      Continue with ergonomic shoe doing well, should you have any severe or new back pain  Return to office or emergency room    Otherwise, to manage your chronic low back pain continue present plan you doing well should you need physical therapy just out reach to be your need  Occasional Medrol Dosepak may help    Try Flonase and Astelin  1 spray each nostril try this for several months  Chronically and see overall if you have some improvement in your symptoms        Consider vascular screening aorta lower extremity, and carotid at least the aorta screening is probably prudent, let me know if you desire this, otherwise look like you are in excellent health keep up the good work, challenge you 10 pounds over the next year    Caution with Flomax, push fluids hydrate well avoid any climbing excetra high risk activities until you get used to the medication, do not take within 4 hours of Viagra could cause dangerously low blood pressure if this does not improve    Your symptoms you should see urology get me a call follow-up 6 months of the  Let me know if you want the vascular screenings at least we should do the abdomen but let me know

## 2024-10-22 NOTE — PATIENT INSTRUCTIONS
Discharge instructions      Continue with ergonomic shoe doing well, should you have any severe or new back pain  Return to office or emergency room    Otherwise, to manage your chronic low back pain continue present plan you doing well should you need physical therapy just out reach to be your need  Occasional Medrol Dosepak may help    Try Flonase and Astelin  1 spray each nostril try this for several months  Chronically and see overall if you have some improvement in your symptoms        Consider vascular screening aorta lower extremity, and carotid at least the aorta screening is probably prudent, let me know if you desire this, otherwise look like you are in excellent health keep up the good work, challenge you 10 pounds over the next year    Caution with Flomax, push fluids hydrate well avoid any climbing excetra high risk activities until you get used to the medication, do not take within 4 hours of Viagra could cause dangerously low blood pressure if this does not improve    Your symptoms you should see urology get me a call follow-up 6 months of the  Let me know if you want the vascular screenings at least we should do the abdomen but let me know

## 2024-10-23 LAB
APPEARANCE UR: CLEAR
BILIRUB UR QL STRIP: NEGATIVE
COLOR UR: ABNORMAL
GLUCOSE UR QL STRIP: NEGATIVE
HGB UR QL STRIP: NEGATIVE
KETONES UR QL STRIP: NEGATIVE
LEUKOCYTE ESTERASE UR QL STRIP: NEGATIVE
NITRITE UR QL STRIP: NEGATIVE
PH UR STRIP: 5.5 [PH] (ref 5–8)
PROT UR QL STRIP: NEGATIVE
SP GR UR STRIP: 1.02 (ref 1–1.03)
UROBILINOGEN UR STRIP-MCNC: ABNORMAL MG/DL

## 2024-12-16 RX ORDER — BUPROPION HYDROCHLORIDE 150 MG/1
150 TABLET, EXTENDED RELEASE ORAL DAILY
Qty: 90 TABLET | Refills: 1 | Status: SHIPPED | OUTPATIENT
Start: 2024-12-16

## 2024-12-16 NOTE — TELEPHONE ENCOUNTER
Rx Refill Note  Requested Prescriptions     Pending Prescriptions Disp Refills    buPROPion SR (WELLBUTRIN SR) 150 MG 12 hr tablet [Pharmacy Med Name: buPROPion HCL  MG TABLET] 90 tablet 1     Sig: TAKE 1 TABLET BY MOUTH DAILY FOR DEPRESSION      Last office visit with prescribing clinician: 10/22/2024   Last telemedicine visit with prescribing clinician: Visit date not found   Next office visit with prescribing clinician: 4/22/2025                         Would you like a call back once the refill request has been completed: [] Yes [] No    If the office needs to give you a call back, can they leave a voicemail: [] Yes [] No    Stacy Pryor MA  12/16/24, 13:06 EST

## 2024-12-30 RX ORDER — SILDENAFIL 100 MG/1
TABLET, FILM COATED ORAL
Qty: 9 TABLET | Refills: 0 | Status: SHIPPED | OUTPATIENT
Start: 2024-12-30

## 2024-12-30 NOTE — TELEPHONE ENCOUNTER
Rx Refill Note  Requested Prescriptions     Pending Prescriptions Disp Refills    sildenafil (VIAGRA) 100 MG tablet [Pharmacy Med Name: SILDENAFIL 100 MG TABLET] 9 tablet 5     Sig: TAKE 1/2 TO 1 TABLET BY MOUTH DAILY ONE HOUR PRIOR TO INTIMACY AS NEEDED      Last office visit with prescribing clinician: 10/22/2024   Last telemedicine visit with prescribing clinician: Visit date not found   Next office visit with prescribing clinician: 4/22/2025                         Would you like a call back once the refill request has been completed: [] Yes [] No    If the office needs to give you a call back, can they leave a voicemail: [] Yes [] No    Stacy Pryor MA  12/30/24, 09:06 EST

## 2025-02-10 RX ORDER — SEMAGLUTIDE 2.68 MG/ML
INJECTION, SOLUTION SUBCUTANEOUS
Qty: 9 ML | Refills: 1 | Status: SHIPPED | OUTPATIENT
Start: 2025-02-10

## 2025-02-10 NOTE — TELEPHONE ENCOUNTER
Rx Refill Note  Requested Prescriptions     Pending Prescriptions Disp Refills    Ozempic, 2 MG/DOSE, 8 MG/3ML solution pen-injector [Pharmacy Med Name: OZEMPIC 2MG/0.75ML DOSE 3 ML PEN] 9 mL 1     Sig: INJECT 2 MG UNDER THE SKIN INTO THE APPROPRIATE AREA ONCE WEEKLY AS DIRECTED FOR TYPE 2 DIABETES      Last office visit with prescribing clinician: 10/22/2024   Last telemedicine visit with prescribing clinician: Visit date not found   Next office visit with prescribing clinician: 4/22/2025                         Would you like a call back once the refill request has been completed: [] Yes [] No    If the office needs to give you a call back, can they leave a voicemail: [] Yes [] No    Varinder Alston MA  02/10/25, 09:54 EST

## 2025-03-11 DIAGNOSIS — G89.29 CHRONIC LOW BACK PAIN, UNSPECIFIED BACK PAIN LATERALITY, UNSPECIFIED WHETHER SCIATICA PRESENT: Primary | ICD-10-CM

## 2025-03-11 DIAGNOSIS — M51.369 BULGING OF LUMBAR INTERVERTEBRAL DISC: ICD-10-CM

## 2025-03-11 DIAGNOSIS — M48.061 SPINAL STENOSIS OF LUMBAR REGION, UNSPECIFIED WHETHER NEUROGENIC CLAUDICATION PRESENT: ICD-10-CM

## 2025-03-11 DIAGNOSIS — M54.50 CHRONIC LOW BACK PAIN, UNSPECIFIED BACK PAIN LATERALITY, UNSPECIFIED WHETHER SCIATICA PRESENT: Primary | ICD-10-CM

## 2025-03-27 DIAGNOSIS — Z79.2 NEED FOR ANTIBIOTIC PROPHYLAXIS FOR DENTAL PROCEDURE: ICD-10-CM

## 2025-03-27 DIAGNOSIS — Z96.652 STATUS POST LEFT KNEE REPLACEMENT: Primary | ICD-10-CM

## 2025-03-28 RX ORDER — CEPHALEXIN 500 MG/1
CAPSULE ORAL
Qty: 4 CAPSULE | Refills: 0 | Status: SHIPPED | OUTPATIENT
Start: 2025-03-28 | End: 2025-03-31 | Stop reason: SDUPTHER

## 2025-03-31 ENCOUNTER — HOSPITAL ENCOUNTER (OUTPATIENT)
Dept: MRI IMAGING | Facility: HOSPITAL | Age: 75
Discharge: HOME | End: 2025-03-31
Admitting: NURSE PRACTITIONER
Payer: MEDICARE

## 2025-03-31 ENCOUNTER — TELEPHONE (OUTPATIENT)
Dept: ORTHOPEDIC SURGERY | Facility: CLINIC | Age: 75
End: 2025-03-31
Payer: MEDICARE

## 2025-03-31 DIAGNOSIS — Z79.2 NEED FOR ANTIBIOTIC PROPHYLAXIS FOR DENTAL PROCEDURE: ICD-10-CM

## 2025-03-31 DIAGNOSIS — Z96.652 STATUS POST LEFT KNEE REPLACEMENT: ICD-10-CM

## 2025-03-31 DIAGNOSIS — M51.369 BULGING OF LUMBAR INTERVERTEBRAL DISC: ICD-10-CM

## 2025-03-31 DIAGNOSIS — M48.061 SPINAL STENOSIS OF LUMBAR REGION, UNSPECIFIED WHETHER NEUROGENIC CLAUDICATION PRESENT: ICD-10-CM

## 2025-03-31 DIAGNOSIS — G89.29 CHRONIC LOW BACK PAIN, UNSPECIFIED BACK PAIN LATERALITY, UNSPECIFIED WHETHER SCIATICA PRESENT: ICD-10-CM

## 2025-03-31 DIAGNOSIS — M54.50 CHRONIC LOW BACK PAIN, UNSPECIFIED BACK PAIN LATERALITY, UNSPECIFIED WHETHER SCIATICA PRESENT: ICD-10-CM

## 2025-03-31 PROCEDURE — 72148 MRI LUMBAR SPINE W/O DYE: CPT

## 2025-03-31 RX ORDER — CEPHALEXIN 500 MG/1
CAPSULE ORAL
Qty: 4 CAPSULE | Refills: 0 | Status: SHIPPED | OUTPATIENT
Start: 2025-03-31 | End: 2025-03-31 | Stop reason: SDUPTHER

## 2025-03-31 RX ORDER — CEPHALEXIN 500 MG/1
CAPSULE ORAL
Qty: 4 CAPSULE | Refills: 0 | Status: SHIPPED | OUTPATIENT
Start: 2025-03-31

## 2025-04-04 RX ORDER — LOSARTAN POTASSIUM 25 MG/1
25 TABLET ORAL DAILY
Qty: 90 TABLET | Refills: 3 | Status: SHIPPED | OUTPATIENT
Start: 2025-04-04

## 2025-04-04 NOTE — TELEPHONE ENCOUNTER
Rx Refill Note  Requested Prescriptions     Pending Prescriptions Disp Refills    losartan (COZAAR) 25 MG tablet [Pharmacy Med Name: LOSARTAN POTASSIUM 25 MG TAB] 90 tablet 1     Sig: TAKE ONE TABLET BY MOUTH DAILY FOR BLOOD PRESSURE      Last office visit with prescribing clinician: 10/22/2024   Last telemedicine visit with prescribing clinician: Visit date not found   Next office visit with prescribing clinician: 4/22/2025                         Would you like a call back once the refill request has been completed: [] Yes [] No    If the office needs to give you a call back, can they leave a voicemail: [] Yes [] No    Jaylyn Kellogg Rep  04/04/25, 09:25 EDT

## 2025-04-10 ENCOUNTER — TELEPHONE (OUTPATIENT)
Dept: FAMILY MEDICINE CLINIC | Facility: CLINIC | Age: 75
End: 2025-04-10
Payer: MEDICARE

## 2025-04-10 DIAGNOSIS — I10 MILD ESSENTIAL HYPERTENSION: Primary | ICD-10-CM

## 2025-04-10 DIAGNOSIS — E11.65 TYPE 2 DIABETES MELLITUS WITH HYPERGLYCEMIA, WITHOUT LONG-TERM CURRENT USE OF INSULIN: ICD-10-CM

## 2025-04-10 DIAGNOSIS — E78.2 MIXED HYPERLIPIDEMIA: ICD-10-CM

## 2025-04-10 NOTE — TELEPHONE ENCOUNTER
Pt is scheduled for a lab appt on 4/14/25. No active orders in chart. Please place lab orders if appropriate.

## 2025-04-11 DIAGNOSIS — I10 MILD ESSENTIAL HYPERTENSION: ICD-10-CM

## 2025-04-11 DIAGNOSIS — E78.2 MIXED HYPERLIPIDEMIA: ICD-10-CM

## 2025-04-11 DIAGNOSIS — E11.65 TYPE 2 DIABETES MELLITUS WITH HYPERGLYCEMIA, WITHOUT LONG-TERM CURRENT USE OF INSULIN: ICD-10-CM

## 2025-04-15 ENCOUNTER — TELEPHONE (OUTPATIENT)
Dept: FAMILY MEDICINE CLINIC | Facility: CLINIC | Age: 75
End: 2025-04-15
Payer: MEDICARE

## 2025-04-15 DIAGNOSIS — E11.65 TYPE 2 DIABETES MELLITUS WITH HYPERGLYCEMIA, WITHOUT LONG-TERM CURRENT USE OF INSULIN: ICD-10-CM

## 2025-04-15 DIAGNOSIS — E11.65 TYPE 2 DIABETES MELLITUS WITH HYPERGLYCEMIA, WITHOUT LONG-TERM CURRENT USE OF INSULIN: Primary | ICD-10-CM

## 2025-04-15 LAB
ALBUMIN SERPL-MCNC: 3.8 G/DL (ref 3.5–5.2)
ALBUMIN/GLOB SERPL: 1.4 G/DL
ALP SERPL-CCNC: 69 U/L (ref 39–117)
ALT SERPL-CCNC: 29 U/L (ref 1–41)
AST SERPL-CCNC: 25 U/L (ref 1–40)
BASOPHILS # BLD AUTO: 0.06 10*3/MM3 (ref 0–0.2)
BASOPHILS NFR BLD AUTO: 1 % (ref 0–1.5)
BILIRUB SERPL-MCNC: 0.5 MG/DL (ref 0–1.2)
BUN SERPL-MCNC: 11 MG/DL (ref 8–23)
BUN/CREAT SERPL: 12.8 (ref 7–25)
CALCIUM SERPL-MCNC: 8.8 MG/DL (ref 8.6–10.5)
CHLORIDE SERPL-SCNC: 105 MMOL/L (ref 98–107)
CHOLEST SERPL-MCNC: 121 MG/DL (ref 0–200)
CO2 SERPL-SCNC: 24.1 MMOL/L (ref 22–29)
CREAT SERPL-MCNC: 0.86 MG/DL (ref 0.76–1.27)
EGFRCR SERPLBLD CKD-EPI 2021: 90.3 ML/MIN/1.73
EOSINOPHIL # BLD AUTO: 0.18 10*3/MM3 (ref 0–0.4)
EOSINOPHIL NFR BLD AUTO: 3.1 % (ref 0.3–6.2)
ERYTHROCYTE [DISTWIDTH] IN BLOOD BY AUTOMATED COUNT: 11.9 % (ref 12.3–15.4)
GLOBULIN SER CALC-MCNC: 2.7 GM/DL
GLUCOSE SERPL-MCNC: 100 MG/DL (ref 65–99)
HBA1C MFR BLD: 5.8 % (ref 4.8–5.6)
HCT VFR BLD AUTO: 46.7 % (ref 37.5–51)
HDLC SERPL-MCNC: 46 MG/DL (ref 40–60)
HGB BLD-MCNC: 15.5 G/DL (ref 13–17.7)
IMM GRANULOCYTES # BLD AUTO: 0.02 10*3/MM3 (ref 0–0.05)
IMM GRANULOCYTES NFR BLD AUTO: 0.3 % (ref 0–0.5)
LDLC SERPL CALC-MCNC: 62 MG/DL (ref 0–100)
LDLC/HDLC SERPL: 1.37 {RATIO}
LYMPHOCYTES # BLD AUTO: 1.7 10*3/MM3 (ref 0.7–3.1)
LYMPHOCYTES NFR BLD AUTO: 29.4 % (ref 19.6–45.3)
MCH RBC QN AUTO: 30.5 PG (ref 26.6–33)
MCHC RBC AUTO-ENTMCNC: 33.2 G/DL (ref 31.5–35.7)
MCV RBC AUTO: 91.7 FL (ref 79–97)
MONOCYTES # BLD AUTO: 0.76 10*3/MM3 (ref 0.1–0.9)
MONOCYTES NFR BLD AUTO: 13.1 % (ref 5–12)
NEUTROPHILS # BLD AUTO: 3.06 10*3/MM3 (ref 1.7–7)
NEUTROPHILS NFR BLD AUTO: 53.1 % (ref 42.7–76)
NRBC BLD AUTO-RTO: 0 /100 WBC (ref 0–0.2)
PLATELET # BLD AUTO: 272 10*3/MM3 (ref 140–450)
POTASSIUM SERPL-SCNC: 4.4 MMOL/L (ref 3.5–5.2)
PROT SERPL-MCNC: 6.5 G/DL (ref 6–8.5)
RBC # BLD AUTO: 5.09 10*6/MM3 (ref 4.14–5.8)
SODIUM SERPL-SCNC: 139 MMOL/L (ref 136–145)
TRIGL SERPL-MCNC: 61 MG/DL (ref 0–150)
UNABLE TO VOID: NORMAL
VLDLC SERPL CALC-MCNC: 13 MG/DL (ref 5–40)
WBC # BLD AUTO: 5.78 10*3/MM3 (ref 3.4–10.8)

## 2025-04-16 LAB
CREAT UR-MCNC: 187.2 MG/DL
PROT UR-MCNC: 18.7 MG/DL
PROT/CREAT UR: 99.9 MG/G CREA (ref 0–200)

## 2025-04-22 ENCOUNTER — OFFICE VISIT (OUTPATIENT)
Dept: FAMILY MEDICINE CLINIC | Facility: CLINIC | Age: 75
End: 2025-04-22
Payer: MEDICARE

## 2025-04-22 VITALS
BODY MASS INDEX: 28.84 KG/M2 | OXYGEN SATURATION: 95 % | SYSTOLIC BLOOD PRESSURE: 116 MMHG | HEART RATE: 98 BPM | TEMPERATURE: 97.7 F | HEIGHT: 68 IN | DIASTOLIC BLOOD PRESSURE: 70 MMHG | WEIGHT: 190.3 LBS

## 2025-04-22 DIAGNOSIS — E78.2 MIXED HYPERLIPIDEMIA: ICD-10-CM

## 2025-04-22 DIAGNOSIS — E11.9 TYPE 2 DIABETES MELLITUS WITHOUT COMPLICATION, WITHOUT LONG-TERM CURRENT USE OF INSULIN: Primary | ICD-10-CM

## 2025-04-22 DIAGNOSIS — I10 MILD ESSENTIAL HYPERTENSION: ICD-10-CM

## 2025-04-22 DIAGNOSIS — M51.362 DEGENERATION OF INTERVERTEBRAL DISC OF LUMBAR REGION WITH DISCOGENIC BACK PAIN AND LOWER EXTREMITY PAIN: ICD-10-CM

## 2025-04-22 RX ORDER — ATORVASTATIN CALCIUM 40 MG/1
TABLET, FILM COATED ORAL
Qty: 90 TABLET | Refills: 3 | Status: SHIPPED | OUTPATIENT
Start: 2025-04-22

## 2025-04-22 RX ORDER — SEMAGLUTIDE 2.68 MG/ML
2 INJECTION, SOLUTION SUBCUTANEOUS WEEKLY
Qty: 9 ML | Refills: 3 | Status: SHIPPED | OUTPATIENT
Start: 2025-04-22

## 2025-04-22 RX ORDER — BUPROPION HYDROCHLORIDE 150 MG/1
150 TABLET, EXTENDED RELEASE ORAL DAILY
Qty: 90 TABLET | Refills: 3 | Status: SHIPPED | OUTPATIENT
Start: 2025-04-22

## 2025-04-22 RX ORDER — LOSARTAN POTASSIUM 25 MG/1
25 TABLET ORAL DAILY
Qty: 90 TABLET | Refills: 3 | Status: SHIPPED | OUTPATIENT
Start: 2025-04-22

## 2025-04-22 NOTE — PROGRESS NOTES
"Chief Complaint  Hypertension and Hyperlipidemia    Subjective        Juan C Kumar presents to [facility] PRIMARY [facility]  History of Present Illness  Close gentleman here today for follow-up prediabetes diabetes, doing well with Ozempic, he is down over 20 pounds and feels better A1c improved to the prediabetes range, hyperlipidemia takes a statin appropriately  He has flareup of back pain for several weeks, recent MRI, shows significant degenerative changes throughout, some several areas of anteriorlithiasis  , He has increased back pain after standing, radiates down Wer[place] right leg gets midline going down the right leg sciatica without weakness bowel bladder incontinence retention Sudbeck paresthesias.  He is ready to do some therapy he has no history of malignancy.      Hypertension    Hyperlipidemia  Associated symptoms include leg pain.   Back Pain  This is a chronic problem. The current episode started more than 1 year ago. The problem occurs daily. The problem has been resolved since onset. The pain is present in the sacro-iliac. The quality of the pain is described as aching. The pain radiates to the right thigh. The pain is at a severity of 5/10. The pain is Worse during the day. The symptoms are aggravated by standing. Associated symptoms include leg pain.       Objective   Vital Signs:  /70 (BP Location: Right arm, Patient Position: Sitting, Cuff Size: Adult)   Pulse 98   Temp 97.7 °F (36.5 °C) (Temporal)   Ht 172.7 cm (67.99\")   Wt 86.3 kg (190 lb 4.8 oz)   SpO2 95%   BMI 28.94 kg/m²   Estimated body mass index is 28.94 kg/m² as calculated from the following:    Height as of this encounter: 172.7 cm (67.99\").    Weight as of this encounter: 86.3 kg (190 lb 4.8 oz).            Physical Exam  Vitals reviewed.   Constitutional:       General: He is not in acute distress.     Appearance: He is well-developed. He is not ill-appearing, toxic-appearing or diaphoretic.      Comments: " Pleasant appears well   HENT:      Head: Normocephalic.      Nose: Nose normal.   Eyes:      General: No scleral icterus.     Conjunctiva/sclera: Conjunctivae normal.      Pupils: Pupils are equal, round, and reactive to light.   Neck:      Thyroid: No thyromegaly.      Vascular: No JVD.   Cardiovascular:      Rate and Rhythm: Normal rate and regular rhythm.      Heart sounds: Normal heart sounds. No murmur heard.     No friction rub. No gallop.   Pulmonary:      Effort: Pulmonary effort is normal. No respiratory distress.      Breath sounds: Normal breath sounds. No stridor. No wheezing or rales.   Abdominal:      General: Bowel sounds are normal. There is no distension.      Palpations: Abdomen is soft.      Tenderness: There is no abdominal tenderness.      Comments: No hepatosplenomegaly, no ascites,   Musculoskeletal:         General: No tenderness.      Cervical back: Neck supple.      Comments: For straight leg raise, able to get up from the table demonstrating some increased back pain without focal weakness.   Lymphadenopathy:      Cervical: No cervical adenopathy.   Skin:     General: Skin is warm and dry.      Capillary Refill: Capillary refill takes less than 2 seconds.      Findings: No erythema or rash.   Neurological:      General: No focal deficit present.      Mental Status: He is alert and oriented to person, place, and time. Mental status is at baseline.      Deep Tendon Reflexes: Reflexes are normal and symmetric.   Psychiatric:         Behavior: Behavior normal.         Thought Content: Thought content normal.         Judgment: Judgment normal.        Result Review :                Assessment and Plan   Diagnoses and all orders for this visit:    1. Type 2 diabetes mellitus without complication, without long-term current use of insulin (Primary)    2. Mild essential hypertension    3. Mixed hyperlipidemia    Other orders  -     Semaglutide, 2 MG/DOSE, (Ozempic, 2 MG/DOSE,) 8 MG/3ML solution  pen-injector; Inject 2 mg under the skin into the appropriate area as directed 1 (One) Time Per Week.  Dispense: 9 mL; Refill: 3  -     losartan (COZAAR) 25 MG tablet; Take 1 tablet by mouth Daily. FOR BLOOD PRESSURE  Dispense: 90 tablet; Refill: 3  -     buPROPion SR (WELLBUTRIN SR) 150 MG 12 hr tablet; Take 1 tablet by mouth Daily. FOR DEPRESSION  Dispense: 90 tablet; Refill: 3  -     atorvastatin (LIPITOR) 40 MG tablet; TAKE ONE TABLET BY MOUTH EVERY EVENING  Indications: High Amount of Fats in the Blood  Dispense: 90 tablet; Refill: 3           I spent 33 minutes caring for Juan C on this date of service. This time includes time spent by me in the following activities:preparing for the visit, reviewing tests, performing a medically appropriate examination and/or evaluation , counseling and educating the patient/family/caregiver, referring and communicating with other health care professionals , documenting information in the medical record, and care coordination  Follow Up   No follow-ups on file.  Patient was given instructions and counseling regarding his condition or for health maintenance advice. Please see specific information pulled into the AVS if appropriate.     Patient Instructions   Discharge instruction      Continue present plan very pleased,\your work and medications and regimen,    For cost savings and/or, possibly improve function, check on the price of Mounjaro  If similar and you want to switch I am not encouraging you you can read about these things but if you wish it would be okay    But otherwise if Mounjaro or Trulicity more affordable we can always make this switch if need be let me know      Otherwise    Follow-up 6 months as long as you are feeling good your blood pressure is controlled, for your wellness,  See an eye doctor yearly for prediabetes diabetes  Should look at your feet, at least once a year,    Immunizations look like you are up-to-date with everything yearly COVID and flu  shot probably a good idea

## 2025-04-25 ENCOUNTER — TELEPHONE (OUTPATIENT)
Dept: FAMILY MEDICINE CLINIC | Facility: CLINIC | Age: 75
End: 2025-04-25

## 2025-04-25 NOTE — TELEPHONE ENCOUNTER
Caller: Juan C Kumar    Relationship: Self    Best call back number: [phone]    What is the medical concern/diagnosis:     What specialty or service is being requested: PHYSICAL THERAPY    What is the provider, practice or medical service name: [facility]     What is the office location:     What is the office phone number: [phone]    Any additional details: REFERRAL WAS SENT INTERNAL AND PATIENT DIDN'T WANT THAT.

## 2025-06-17 ENCOUNTER — OFFICE VISIT (OUTPATIENT)
Dept: FAMILY MEDICINE CLINIC | Facility: CLINIC | Age: 75
End: 2025-06-17
Payer: MEDICARE

## 2025-06-17 VITALS
HEIGHT: 68 IN | HEART RATE: 98 BPM | OXYGEN SATURATION: 94 % | BODY MASS INDEX: 28.79 KG/M2 | SYSTOLIC BLOOD PRESSURE: 124 MMHG | DIASTOLIC BLOOD PRESSURE: 78 MMHG | WEIGHT: 190 LBS

## 2025-06-17 DIAGNOSIS — R09.81 SINUS CONGESTION: ICD-10-CM

## 2025-06-17 DIAGNOSIS — B94.8 POST-VIRAL DISORDER: Primary | ICD-10-CM

## 2025-06-17 PROCEDURE — G2211 COMPLEX E/M VISIT ADD ON: HCPCS | Performed by: NURSE PRACTITIONER

## 2025-06-17 PROCEDURE — 3074F SYST BP LT 130 MM HG: CPT | Performed by: NURSE PRACTITIONER

## 2025-06-17 PROCEDURE — 99213 OFFICE O/P EST LOW 20 MIN: CPT | Performed by: NURSE PRACTITIONER

## 2025-06-17 PROCEDURE — 3078F DIAST BP <80 MM HG: CPT | Performed by: NURSE PRACTITIONER

## 2025-06-17 PROCEDURE — 1126F AMNT PAIN NOTED NONE PRSNT: CPT | Performed by: NURSE PRACTITIONER

## 2025-06-17 RX ORDER — IPRATROPIUM BROMIDE 42 UG/1
1 SPRAY, METERED NASAL 3 TIMES DAILY
Qty: 15 ML | Refills: 0 | Status: SHIPPED | OUTPATIENT
Start: 2025-06-17

## 2025-06-17 NOTE — PROGRESS NOTES
"Chief Complaint  Nasal Congestion (2wks - pt would like new prescription for loratadine-pseudoephedrine (Claritin-D 12 Hour) 5-120 MG per 12 hr tablet) and Cough    Subjective        Juan C Kumar presents to [facility]  History of Present Illness  Pleasant gentleman here today recently about 2 weeks ago developed a mild sinusitis symptoms and upper respiratory symptoms sinus congestion postnasal drip, and then started with aggravating cough, now its mostly sinus pressure congestion lots of drainage clear yellow in the morning thick,  Without any focal sinus pain severe headache or fever no chest pain or shortness of breath,  He has some allergies as well but feels like this was more he is wondering if he should get an antibiotic he took some Claritin-D which helped,      Cough      Objective   Vital Signs:  /78   Pulse 98   Ht 172.7 cm (67.99\")   Wt 86.2 kg (190 lb)   SpO2 94%   BMI 28.90 kg/m²   Estimated body mass index is 28.9 kg/m² as calculated from the following:    Height as of this encounter: 172.7 cm (67.99\").    Weight as of this encounter: 86.2 kg (190 lb).            Physical Exam  Vitals reviewed.   Constitutional:       General: He is not in acute distress.     Appearance: Normal appearance. He is well-developed. He is not ill-appearing, toxic-appearing or diaphoretic.   HENT:      Head: Normocephalic.      Nose: Nose normal.   Eyes:      General: No scleral icterus.     Conjunctiva/sclera: Conjunctivae normal.      Pupils: Pupils are equal, round, and reactive to light.   Neck:      Thyroid: No thyromegaly.      Vascular: No JVD.   Cardiovascular:      Rate and Rhythm: Normal rate and regular rhythm.      Heart sounds: Normal heart sounds. No murmur heard.     No friction rub. No gallop.   Pulmonary:      Effort: Pulmonary effort is normal. No respiratory distress.      Breath sounds: Normal breath sounds. No stridor. No wheezing or rales.   Abdominal:      " General: Bowel sounds are normal. There is no distension.      Palpations: Abdomen is soft.      Tenderness: There is no abdominal tenderness.      Comments: No hepatosplenomegaly, no ascites,   Musculoskeletal:         General: No tenderness.      Cervical back: Neck supple.   Lymphadenopathy:      Cervical: No cervical adenopathy.   Skin:     General: Skin is warm and dry.      Findings: No erythema or rash.   Neurological:      General: No focal deficit present.      Mental Status: He is alert and oriented to person, place, and time. Mental status is at baseline.      Deep Tendon Reflexes: Reflexes are normal and symmetric.   Psychiatric:         Mood and Affect: Mood normal.         Behavior: Behavior normal.         Thought Content: Thought content normal.         Judgment: Judgment normal.        Result Review :                Assessment and Plan   Diagnoses and all orders for this visit:    1. Post-viral disorder (Primary)    2. Sinus congestion    Other orders  -     ipratropium (ATROVENT) 0.06 % nasal spray; Administer 1 spray into the nostril(s) as directed by provider 3 (Three) Times a Day. As needed for excessive postnasal drip aggravating your cough  Dispense: 15 mL; Refill: 0             Follow Up   Return for Print discharge instructions/educational handouts, Labs today.  Patient was given instructions and counseling regarding his condition or for health maintenance advice. Please see specific information pulled into the AVS if appropriate.     Patient Instructions   Discharge instructions  Discontinue Sudafed or decongestion    Get Afrin over-the-counter a topical decongestion just for your sinuses  This is for rare use only and never more than 3 days  2 sprays each nostril now    Saline nasal spray he can use this frequently as needed and this can be used to gently irrigate your sinuses once or twice a day for the next 2 to 3 days    Atrovent  Nasal spray to dry your sinuses for post viral syndrome  symptoms basically excessive postnasal drip aggravating your cough and drainage in the morning and throughout the day    High fever chest pain shortness of breath emergency room  Should you have more focal sinus pain and tenderness let me know as you may need an antibiotic  Should you have severe headache fever chills emergency room

## 2025-06-17 NOTE — PATIENT INSTRUCTIONS
Discharge instructions  Discontinue Sudafed or decongestion    Get Afrin over-the-counter a topical decongestion just for your sinuses  This is for rare use only and never more than 3 days  2 sprays each nostril now    Saline nasal spray he can use this frequently as needed and this can be used to gently irrigate your sinuses once or twice a day for the next 2 to 3 days    Atrovent  Nasal spray to dry your sinuses for post viral syndrome symptoms basically excessive postnasal drip aggravating your cough and drainage in the morning and throughout the day    High fever chest pain shortness of breath emergency room  Should you have more focal sinus pain and tenderness let me know as you may need an antibiotic  Should you have severe headache fever chills emergency room

## 2025-07-03 DIAGNOSIS — Z79.2 NEED FOR ANTIBIOTIC PROPHYLAXIS FOR DENTAL PROCEDURE: Primary | ICD-10-CM

## 2025-07-03 RX ORDER — CEPHALEXIN 500 MG/1
CAPSULE ORAL
Qty: 4 CAPSULE | Refills: 0 | Status: SHIPPED | OUTPATIENT
Start: 2025-07-03

## 2025-07-08 RX ORDER — SEMAGLUTIDE 2.68 MG/ML
INJECTION, SOLUTION SUBCUTANEOUS
Qty: 9 ML | Refills: 1 | Status: SHIPPED | OUTPATIENT
Start: 2025-07-08

## 2025-07-08 NOTE — TELEPHONE ENCOUNTER
Rx Refill Note  Requested Prescriptions     Pending Prescriptions Disp Refills    Ozempic, 2 MG/DOSE, 8 MG/3ML solution pen-injector [Pharmacy Med Name: OZEMPIC 2MG/0.75ML DOSE 3 ML PEN] 9 mL 1     Sig: INJECT 2 MG UNDER THE SKIN INTO THE APPROPRIATE AREA ONCE WEEKLY AS DIRECTED FOR TYPE 2 DIABETES      Last office visit with prescribing clinician: 6/17/2025   Last telemedicine visit with prescribing clinician: Visit date not found   Next office visit with prescribing clinician: 10/24/2025                         Would you like a call back once the refill request has been completed: [] Yes [] No    If the office needs to give you a call back, can they leave a voicemail: [] Yes [] No    Shahana Verma MA  07/08/25, 09:14 EDT

## 2025-08-19 RX ORDER — SILDENAFIL 100 MG/1
TABLET, FILM COATED ORAL
Qty: 30 TABLET | Refills: 3 | Status: SHIPPED | OUTPATIENT
Start: 2025-08-19

## (undated) DEVICE — ANTIBACTERIAL UNDYED BRAIDED (POLYGLACTIN 910), SYNTHETIC ABSORBABLE SUTURE: Brand: COATED VICRYL

## (undated) DEVICE — CEMENT MIXING SYSTEM WITH FEMORAL BREAKWAY NOZZLE: Brand: REVOLUTION

## (undated) DEVICE — GLV SURG BIOGEL LTX PF 6 1/2

## (undated) DEVICE — SOL ISO/ALC 70PCT 4OZ

## (undated) DEVICE — SUT VIC 2/0 CT2 27IN J269H

## (undated) DEVICE — DECANTER BAG 9": Brand: MEDLINE INDUSTRIES, INC.

## (undated) DEVICE — PATIENT RETURN ELECTRODE, SINGLE-USE, CONTACT QUALITY MONITORING, ADULT, WITH 9FT CORD, FOR PATIENTS WEIGING OVER 33LBS. (15KG): Brand: MEGADYNE

## (undated) DEVICE — DUAL CUT SAGITTAL BLADE

## (undated) DEVICE — BNDG ELAS ELITE V/CLOSE 6IN 5YD LF STRL

## (undated) DEVICE — PREP SOL POVIDONE/IODINE BT 4OZ

## (undated) DEVICE — GLV SURG BIOGEL LTX PF 8 1/2

## (undated) DEVICE — SNAR POLYP SENSATION STDOVL 27 240 BX40

## (undated) DEVICE — TUBING, SUCTION, 1/4" X 10', STRAIGHT: Brand: MEDLINE

## (undated) DEVICE — 450 ML BOTTLE OF 0.05% CHLORHEXIDINE GLUCONATE IN 99.95% STERILE WATER FOR IRRIGATION, USP AND APPLICATOR.: Brand: IRRISEPT ANTIMICROBIAL WOUND LAVAGE

## (undated) DEVICE — GLV SURG BIOGEL LTX PF 7

## (undated) DEVICE — STERILE PATIENT PROTECTIVE PAD FOR IMP® KNEE POSITIONERS & COHESIVE WRAP (10 / CASE): Brand: DE MAYO KNEE POSITIONER®

## (undated) DEVICE — TRAP FLD MINIVAC MEGADYNE 100ML

## (undated) DEVICE — NEEDLE, QUINCKE, 20GX3.5": Brand: MEDLINE

## (undated) DEVICE — GLV SURG SENSICARE POLYISPRN W/ALOE PF LF 6.5 GRN STRL

## (undated) DEVICE — PK KN TOTL 40

## (undated) DEVICE — SKIN PREP TRAY W/CHG: Brand: MEDLINE INDUSTRIES, INC.

## (undated) DEVICE — TBG PENCL TELESCP MEGADYNE SMOKE EVAC 10FT

## (undated) DEVICE — SENSR O2 OXIMAX FNGR A/ 18IN NONSTR

## (undated) DEVICE — GLV SURG SENSICARE PI MIC PF SZ7 LF STRL

## (undated) DEVICE — ZIP 24 SURGICAL SKIN CLOSURE DEVICE, PSA: Brand: ZIP 24 SURGICAL SKIN CLOSURE DEVICE

## (undated) DEVICE — LN SMPL CO2 SHTRM SD STREAM W/M LUER

## (undated) DEVICE — CANN O2 ETCO2 FITS ALL CONN CO2 SMPL A/ 7IN DISP LF

## (undated) DEVICE — THE SINGLE USE ETRAP – POLYP TRAP IS USED FOR SUCTION RETRIEVAL OF ENDOSCOPICALLY REMOVED POLYPS.: Brand: ETRAP

## (undated) DEVICE — ADAPT CLN BIOGUARD AIR/H2O DISP

## (undated) DEVICE — DISPOSABLE TOURNIQUET CUFF SINGLE BLADDER, SINGLE PORT AND QUICK CONNECT CONNECTOR: Brand: COLOR CUFF

## (undated) DEVICE — APPL CHLORAPREP HI/LITE 26ML ORNG

## (undated) DEVICE — UNDERCAST PADDING: Brand: DEROYAL

## (undated) DEVICE — KT ORCA ORCAPOD DISP STRL

## (undated) DEVICE — GLV SURG SIGNATURE ESSENTIAL PF LTX SZ8.5

## (undated) DEVICE — SOL IRR NACL 0.9PCT 3000ML